# Patient Record
Sex: FEMALE | Race: WHITE | Employment: FULL TIME | URBAN - METROPOLITAN AREA
[De-identification: names, ages, dates, MRNs, and addresses within clinical notes are randomized per-mention and may not be internally consistent; named-entity substitution may affect disease eponyms.]

---

## 2017-05-30 ENCOUNTER — EMPLOYEE WELLNESS (OUTPATIENT)
Dept: OTHER | Age: 52
End: 2017-05-30

## 2017-05-30 LAB
CHOLESTEROL, TOTAL: 209 MG/DL (ref 0–199)
FASTING: YES
GLUCOSE BLD-MCNC: 109 MG/DL (ref 74–109)
HDLC SERPL-MCNC: 54 MG/DL (ref 40–90)
LDL CHOLESTEROL CALCULATED: 129 MG/DL
TRIGL SERPL-MCNC: 130 MG/DL (ref 0–199)

## 2017-07-07 ENCOUNTER — OFFICE VISIT (OUTPATIENT)
Dept: BARIATRICS/WEIGHT MGMT | Age: 52
End: 2017-07-07

## 2017-07-07 VITALS
BODY MASS INDEX: 50.02 KG/M2 | TEMPERATURE: 98.1 F | SYSTOLIC BLOOD PRESSURE: 122 MMHG | HEART RATE: 77 BPM | DIASTOLIC BLOOD PRESSURE: 76 MMHG | HEIGHT: 64 IN | WEIGHT: 293 LBS | RESPIRATION RATE: 18 BRPM

## 2017-07-07 DIAGNOSIS — N97.0 INFERTILITY ASSOCIATED WITH ANOVULATION: ICD-10-CM

## 2017-07-07 DIAGNOSIS — K21.9 GASTROESOPHAGEAL REFLUX DISEASE, ESOPHAGITIS PRESENCE NOT SPECIFIED: ICD-10-CM

## 2017-07-07 DIAGNOSIS — E66.01 MORBID OBESITY DUE TO EXCESS CALORIES (HCC): Primary | ICD-10-CM

## 2017-07-07 DIAGNOSIS — I10 ESSENTIAL HYPERTENSION: ICD-10-CM

## 2017-07-07 DIAGNOSIS — M54.50 CHRONIC BILATERAL LOW BACK PAIN WITHOUT SCIATICA: ICD-10-CM

## 2017-07-07 DIAGNOSIS — G89.29 CHRONIC BILATERAL LOW BACK PAIN WITHOUT SCIATICA: ICD-10-CM

## 2017-07-07 PROCEDURE — 99203 OFFICE O/P NEW LOW 30 MIN: CPT | Performed by: SURGERY

## 2017-07-07 RX ORDER — OMEPRAZOLE 20 MG/1
20 CAPSULE, DELAYED RELEASE ORAL DAILY
COMMUNITY

## 2017-07-07 ASSESSMENT — ENCOUNTER SYMPTOMS
GASTROINTESTINAL NEGATIVE: 1
SHORTNESS OF BREATH: 1
ALLERGIC/IMMUNOLOGIC NEGATIVE: 1
APNEA: 0
EYES NEGATIVE: 1
BACK PAIN: 1

## 2017-07-19 ENCOUNTER — TELEPHONE (OUTPATIENT)
Dept: BARIATRICS/WEIGHT MGMT | Age: 52
End: 2017-07-19

## 2017-07-21 ENCOUNTER — OFFICE VISIT (OUTPATIENT)
Dept: BARIATRICS/WEIGHT MGMT | Age: 52
End: 2017-07-21

## 2017-07-21 DIAGNOSIS — E66.01 MORBID OBESITY, UNSPECIFIED OBESITY TYPE (HCC): Primary | ICD-10-CM

## 2017-07-24 DIAGNOSIS — Z13.21 MALNUTRITION SCREEN: Primary | ICD-10-CM

## 2017-07-24 DIAGNOSIS — Z01.818 PRE-OP TESTING: ICD-10-CM

## 2017-07-24 DIAGNOSIS — Z13.820 SCREENING FOR OSTEOPOROSIS: ICD-10-CM

## 2017-07-24 DIAGNOSIS — E66.01 MORBID OBESITY WITH BODY MASS INDEX OF 60.0-69.9 IN ADULT (HCC): ICD-10-CM

## 2017-08-08 ENCOUNTER — HOSPITAL ENCOUNTER (OUTPATIENT)
Dept: WOMENS IMAGING | Age: 52
Discharge: HOME | End: 2017-08-08
Payer: COMMERCIAL

## 2017-08-08 DIAGNOSIS — Z01.818 PRE-OP TESTING: ICD-10-CM

## 2017-08-08 DIAGNOSIS — Z13.21 MALNUTRITION SCREEN: ICD-10-CM

## 2017-08-08 DIAGNOSIS — E66.01 MORBID OBESITY WITH BODY MASS INDEX OF 60.0-69.9 IN ADULT (HCC): ICD-10-CM

## 2017-08-08 DIAGNOSIS — Z13.820 SCREENING FOR OSTEOPOROSIS: ICD-10-CM

## 2017-08-08 PROCEDURE — 77080 DXA BONE DENSITY AXIAL: CPT

## 2017-08-22 ENCOUNTER — OFFICE VISIT (OUTPATIENT)
Dept: BARIATRICS/WEIGHT MGMT | Age: 52
End: 2017-08-22
Payer: COMMERCIAL

## 2017-08-22 ENCOUNTER — OFFICE VISIT (OUTPATIENT)
Dept: BARIATRICS/WEIGHT MGMT | Age: 52
End: 2017-08-22

## 2017-08-22 VITALS
HEART RATE: 80 BPM | TEMPERATURE: 97.7 F | WEIGHT: 293 LBS | SYSTOLIC BLOOD PRESSURE: 136 MMHG | RESPIRATION RATE: 18 BRPM | BODY MASS INDEX: 50.02 KG/M2 | DIASTOLIC BLOOD PRESSURE: 84 MMHG | HEIGHT: 64 IN

## 2017-08-22 DIAGNOSIS — K21.9 GASTROESOPHAGEAL REFLUX DISEASE, ESOPHAGITIS PRESENCE NOT SPECIFIED: ICD-10-CM

## 2017-08-22 DIAGNOSIS — E66.01 MORBID OBESITY WITH BMI OF 60.0-69.9, ADULT (HCC): Primary | ICD-10-CM

## 2017-08-22 DIAGNOSIS — F32.A DEPRESSION, UNSPECIFIED DEPRESSION TYPE: ICD-10-CM

## 2017-08-22 DIAGNOSIS — E66.01 MORBID OBESITY, UNSPECIFIED OBESITY TYPE (HCC): Primary | ICD-10-CM

## 2017-08-22 DIAGNOSIS — I10 ESSENTIAL HYPERTENSION: ICD-10-CM

## 2017-08-22 PROCEDURE — 99213 OFFICE O/P EST LOW 20 MIN: CPT | Performed by: PHYSICIAN ASSISTANT

## 2017-08-30 ENCOUNTER — HOSPITAL ENCOUNTER (OUTPATIENT)
Age: 52
Discharge: HOME | End: 2017-08-30
Payer: COMMERCIAL

## 2017-08-30 DIAGNOSIS — E66.01 MORBID OBESITY WITH BODY MASS INDEX OF 60.0-69.9 IN ADULT (HCC): ICD-10-CM

## 2017-08-30 DIAGNOSIS — Z13.21 MALNUTRITION SCREEN: ICD-10-CM

## 2017-08-30 DIAGNOSIS — Z01.818 PRE-OP TESTING: ICD-10-CM

## 2017-08-30 LAB
ALBUMIN SERPL-MCNC: 4.1 G/DL (ref 3.5–5.1)
ALP BLD-CCNC: 97 U/L (ref 38–126)
ALT SERPL-CCNC: 34 U/L (ref 11–66)
ANION GAP SERPL CALCULATED.3IONS-SCNC: 13 MEQ/L (ref 8–16)
AST SERPL-CCNC: 23 U/L (ref 5–40)
AVERAGE GLUCOSE: 105 MG/DL (ref 70–126)
BASOPHILS # BLD: 1 %
BASOPHILS ABSOLUTE: 0.1 THOU/MM3 (ref 0–0.1)
BILIRUB SERPL-MCNC: 0.5 MG/DL (ref 0.3–1.2)
BUN BLDV-MCNC: 21 MG/DL (ref 7–22)
CALCIUM SERPL-MCNC: 9.5 MG/DL (ref 8.5–10.5)
CHLORIDE BLD-SCNC: 99 MEQ/L (ref 98–111)
CO2: 27 MEQ/L (ref 23–33)
CREAT SERPL-MCNC: 0.7 MG/DL (ref 0.4–1.2)
EKG ATRIAL RATE: 70 BPM
EKG P AXIS: 54 DEGREES
EKG P-R INTERVAL: 192 MS
EKG Q-T INTERVAL: 412 MS
EKG QRS DURATION: 88 MS
EKG QTC CALCULATION (BAZETT): 444 MS
EKG R AXIS: 78 DEGREES
EKG T AXIS: 59 DEGREES
EKG VENTRICULAR RATE: 70 BPM
EOSINOPHIL # BLD: 3.4 %
EOSINOPHILS ABSOLUTE: 0.2 THOU/MM3 (ref 0–0.4)
FERRITIN: 108 NG/ML (ref 10–291)
FOLATE: > 20 NG/ML (ref 4.8–24.2)
GFR SERPL CREATININE-BSD FRML MDRD: 88 ML/MIN/1.73M2
GLUCOSE BLD-MCNC: 102 MG/DL (ref 70–108)
HBA1C MFR BLD: 5.5 % (ref 4.4–6.4)
HCT VFR BLD CALC: 41.6 % (ref 37–47)
HEMOGLOBIN: 14.2 GM/DL (ref 12–16)
INR BLD: 1.04 (ref 0.85–1.13)
IRON: 95 UG/DL (ref 50–170)
LYMPHOCYTES # BLD: 20.6 %
LYMPHOCYTES ABSOLUTE: 1.4 THOU/MM3 (ref 1–4.8)
MCH RBC QN AUTO: 28.6 PG (ref 27–31)
MCHC RBC AUTO-ENTMCNC: 34.1 GM/DL (ref 33–37)
MCV RBC AUTO: 83.9 FL (ref 81–99)
MONOCYTES # BLD: 6.9 %
MONOCYTES ABSOLUTE: 0.5 THOU/MM3 (ref 0.4–1.3)
NUCLEATED RED BLOOD CELLS: 0 /100 WBC
PDW BLD-RTO: 13.9 % (ref 11.5–14.5)
PLATELET # BLD: 267 THOU/MM3 (ref 130–400)
PMV BLD AUTO: 9.3 MCM (ref 7.4–10.4)
POTASSIUM SERPL-SCNC: 3.7 MEQ/L (ref 3.5–5.2)
PREALBUMIN: 25.7 MG/DL (ref 20–40)
PTH INTACT: 60 PG/ML (ref 15–65)
RBC # BLD: 4.96 MILL/MM3 (ref 4.2–5.4)
RBC # BLD: NORMAL 10*6/UL
SEG NEUTROPHILS: 68.1 %
SEGMENTED NEUTROPHILS ABSOLUTE COUNT: 4.6 THOU/MM3 (ref 1.8–7.7)
SODIUM BLD-SCNC: 139 MEQ/L (ref 135–145)
TOTAL IRON BINDING CAPACITY: 318 UG/DL (ref 171–450)
TOTAL PROTEIN: 7.3 G/DL (ref 6.1–8)
TSH SERPL DL<=0.05 MIU/L-ACNC: 1.04 UIU/ML (ref 0.4–4.2)
VITAMIN B-12: 325 PG/ML (ref 211–911)
VITAMIN D 25-HYDROXY: 33 NG/ML (ref 30–100)
WBC # BLD: 6.8 THOU/MM3 (ref 4.8–10.8)

## 2017-08-30 PROCEDURE — 80050 GENERAL HEALTH PANEL: CPT

## 2017-08-30 PROCEDURE — 84134 ASSAY OF PREALBUMIN: CPT

## 2017-08-30 PROCEDURE — 82607 VITAMIN B-12: CPT

## 2017-08-30 PROCEDURE — 83970 ASSAY OF PARATHORMONE: CPT

## 2017-08-30 PROCEDURE — 82746 ASSAY OF FOLIC ACID SERUM: CPT

## 2017-08-30 PROCEDURE — 83540 ASSAY OF IRON: CPT

## 2017-08-30 PROCEDURE — 82306 VITAMIN D 25 HYDROXY: CPT

## 2017-08-30 PROCEDURE — G0480 DRUG TEST DEF 1-7 CLASSES: HCPCS

## 2017-08-30 PROCEDURE — 84590 ASSAY OF VITAMIN A: CPT

## 2017-08-30 PROCEDURE — 80307 DRUG TEST PRSMV CHEM ANLYZR: CPT

## 2017-08-30 PROCEDURE — 84425 ASSAY OF VITAMIN B-1: CPT

## 2017-08-30 PROCEDURE — 36415 COLL VENOUS BLD VENIPUNCTURE: CPT

## 2017-08-30 PROCEDURE — 82728 ASSAY OF FERRITIN: CPT

## 2017-08-30 PROCEDURE — 85610 PROTHROMBIN TIME: CPT

## 2017-08-30 PROCEDURE — 83550 IRON BINDING TEST: CPT

## 2017-08-30 PROCEDURE — 93005 ELECTROCARDIOGRAM TRACING: CPT

## 2017-08-30 PROCEDURE — 83036 HEMOGLOBIN GLYCOSYLATED A1C: CPT

## 2017-09-01 LAB — RETINOL (VITAMIN A): NORMAL

## 2017-09-02 LAB — VITAMIN B1 WHOLE BLOOD: 122 NMOL/L (ref 70–180)

## 2017-09-03 LAB — NICOTINE AND METABOLITES: NORMAL

## 2017-09-04 LAB — URINE DRUG PROFILE: NORMAL

## 2017-09-11 ENCOUNTER — TELEPHONE (OUTPATIENT)
Dept: SURGERY | Age: 52
End: 2017-09-11

## 2017-09-12 DIAGNOSIS — E66.01 MORBID OBESITY WITH BMI OF 60.0-69.9, ADULT (HCC): ICD-10-CM

## 2017-09-12 DIAGNOSIS — Z12.11 SCREENING FOR COLON CANCER: Primary | ICD-10-CM

## 2017-09-13 ENCOUNTER — TELEPHONE (OUTPATIENT)
Dept: SURGERY | Age: 52
End: 2017-09-13

## 2017-09-20 ENCOUNTER — OFFICE VISIT (OUTPATIENT)
Dept: BARIATRICS/WEIGHT MGMT | Age: 52
End: 2017-09-20

## 2017-09-20 ENCOUNTER — OFFICE VISIT (OUTPATIENT)
Dept: BARIATRICS/WEIGHT MGMT | Age: 52
End: 2017-09-20
Payer: COMMERCIAL

## 2017-09-20 VITALS
SYSTOLIC BLOOD PRESSURE: 136 MMHG | TEMPERATURE: 98.2 F | BODY MASS INDEX: 50.02 KG/M2 | HEIGHT: 64 IN | HEART RATE: 76 BPM | DIASTOLIC BLOOD PRESSURE: 84 MMHG | WEIGHT: 293 LBS

## 2017-09-20 VITALS — WEIGHT: 293 LBS | BODY MASS INDEX: 50.02 KG/M2 | HEIGHT: 64 IN

## 2017-09-20 DIAGNOSIS — I10 ESSENTIAL HYPERTENSION: ICD-10-CM

## 2017-09-20 DIAGNOSIS — E66.01 MORBID OBESITY WITH BMI OF 60.0-69.9, ADULT (HCC): Primary | ICD-10-CM

## 2017-09-20 DIAGNOSIS — F32.A DEPRESSION, UNSPECIFIED DEPRESSION TYPE: ICD-10-CM

## 2017-09-20 DIAGNOSIS — K21.9 GASTROESOPHAGEAL REFLUX DISEASE, ESOPHAGITIS PRESENCE NOT SPECIFIED: ICD-10-CM

## 2017-09-20 DIAGNOSIS — E66.01 MORBID OBESITY, UNSPECIFIED OBESITY TYPE (HCC): Primary | ICD-10-CM

## 2017-09-20 PROCEDURE — 99213 OFFICE O/P EST LOW 20 MIN: CPT | Performed by: PHYSICIAN ASSISTANT

## 2017-10-20 ENCOUNTER — OFFICE VISIT (OUTPATIENT)
Dept: BARIATRICS/WEIGHT MGMT | Age: 52
End: 2017-10-20
Payer: COMMERCIAL

## 2017-10-20 ENCOUNTER — OFFICE VISIT (OUTPATIENT)
Dept: BARIATRICS/WEIGHT MGMT | Age: 52
End: 2017-10-20

## 2017-10-20 VITALS
DIASTOLIC BLOOD PRESSURE: 66 MMHG | BODY MASS INDEX: 50.02 KG/M2 | SYSTOLIC BLOOD PRESSURE: 114 MMHG | RESPIRATION RATE: 18 BRPM | TEMPERATURE: 98.1 F | HEIGHT: 64 IN | WEIGHT: 293 LBS | HEART RATE: 86 BPM

## 2017-10-20 VITALS — HEIGHT: 64 IN | WEIGHT: 293 LBS | BODY MASS INDEX: 50.02 KG/M2

## 2017-10-20 DIAGNOSIS — E66.01 MORBID OBESITY DUE TO EXCESS CALORIES (HCC): Primary | ICD-10-CM

## 2017-10-20 DIAGNOSIS — M54.50 CHRONIC BILATERAL LOW BACK PAIN WITHOUT SCIATICA: ICD-10-CM

## 2017-10-20 DIAGNOSIS — N97.0 INFERTILITY ASSOCIATED WITH ANOVULATION: ICD-10-CM

## 2017-10-20 DIAGNOSIS — I10 HYPERTENSION, UNSPECIFIED TYPE: ICD-10-CM

## 2017-10-20 DIAGNOSIS — G89.29 CHRONIC BILATERAL LOW BACK PAIN WITHOUT SCIATICA: ICD-10-CM

## 2017-10-20 DIAGNOSIS — K21.9 GASTROESOPHAGEAL REFLUX DISEASE, ESOPHAGITIS PRESENCE NOT SPECIFIED: ICD-10-CM

## 2017-10-20 DIAGNOSIS — F32.A DEPRESSION, UNSPECIFIED DEPRESSION TYPE: ICD-10-CM

## 2017-10-20 DIAGNOSIS — E66.01 MORBID OBESITY WITH BMI OF 60.0-69.9, ADULT (HCC): Primary | ICD-10-CM

## 2017-10-20 PROCEDURE — 99213 OFFICE O/P EST LOW 20 MIN: CPT | Performed by: SURGERY

## 2017-10-20 ASSESSMENT — ENCOUNTER SYMPTOMS
APNEA: 0
ALLERGIC/IMMUNOLOGIC NEGATIVE: 1
GASTROINTESTINAL NEGATIVE: 1
BACK PAIN: 0
EYES NEGATIVE: 1
SHORTNESS OF BREATH: 1

## 2017-10-20 NOTE — PROGRESS NOTES
Assessment: Patient is a 46 y.o. female seen for   month three  follow up MNT visit for  pre op bariatric surgery desires sleeve    Vitals from current and previous visits:  Vitals 10/20/2017 8/59/7348   SYSTOLIC  158   DIASTOLIC  84   Site  Right Arm   Position  Sitting   Cuff Size  Large Adult   Pulse  76   Temp  98.2   Resp     Weight 363 lb 12.8 oz 367 lb   Height 5' 4\" 5' 4\"   BMI (wt*703/ht~2) 62.44 kg/m2 62.99 kg/m2   Pain Level     Waist (Inches)         Initial weight at start of Weight Management Program was: 365 lbs     Javier Kumar lost 4 lbs over one month  -Weight goal: lose weight.     -Nutritionally relevant labs:   Lab Results   Component Value Date/Time    LABA1C 5.5 08/30/2017 10:33 AM    GLUCOSE 102 08/30/2017 10:33 AM    GLUCOSE 109 05/30/2017 09:28 AM    CHOL 209 (H) 05/30/2017 09:28 AM    HDL 54 05/30/2017 09:28 AM    LDLCALC 129 05/30/2017 09:28 AM    TRIG 130 05/30/2017 09:28 AM                  Lab Results   Component Value Date    VITD25 33 08/30/2017     - Is patient taking daily Multivitamin:  Pt is taking a daily MVI    -Food Recall:   Has been packing meals for work and now making greek yogurt with kashi, and fruit,  Has been limiting snacks, specifically in the evening. Overall improved food choices with less eating out. Main Beverages: reports drinking at least 64 oz of water daily, omitted alcohol  -Impression of Dietary Intake: on average, 3 meals per day. - Pt  is   avoiding high fat/high sugar foods. Pt  is   including protein at meals and snacks. Exercise:    -Physical Activity is:  Using Fit Bit and has goal set at 5,000 steps daily. States most of the time is meeting this goal.  Overall pt recognizes requires to continue to improve physical activity especially after surgery. Nutrition Diagnosis: Overweight/Obesity related to currently undergoing MNT as evidenced by  Body mass index is 62.45 kg/m². .    Intervention:   Healthy behaviors: consuming fruits and vegetables,

## 2017-10-20 NOTE — PROGRESS NOTES
Eyes: Conjunctivae and EOM are normal. Pupils are equal, round, and reactive to light. Right eye exhibits no discharge. Left eye exhibits no discharge. No scleral icterus. Neck: Trachea normal, normal range of motion, full passive range of motion without pain and phonation normal. Neck supple. No JVD present. Cardiovascular: Normal rate, regular rhythm, S1 normal, S2 normal and normal heart sounds. Exam reveals no gallop and no friction rub. No murmur heard. Pulmonary/Chest: Effort normal and breath sounds normal. No respiratory distress. She has no decreased breath sounds. She has no wheezes. She has no rhonchi. She has no rales. She exhibits no tenderness and no deformity. Abdominal: Soft. Bowel sounds are normal. She exhibits no distension, no abdominal bruit and no mass. There is no hepatosplenomegaly. There is no tenderness. There is no rigidity, no rebound and no guarding. No hernia. Hernia confirmed negative in the ventral area. Musculoskeletal: Normal range of motion. She exhibits no edema or tenderness. Neurological: She is alert and oriented to person, place, and time. She has normal strength. No cranial nerve deficit or sensory deficit. Skin: Skin is warm and dry. No rash noted. She is not diaphoretic. No erythema. No pallor. Psychiatric: She has a normal mood and affect. Her speech is normal and behavior is normal. Judgment and thought content normal. Cognition and memory are normal.   Vitals reviewed.     CBC   Lab Results   Component Value Date    WBC 6.8 08/30/2017    RBC 4.96 08/30/2017    RBC 4.82 11/17/2011    HGB 14.2 08/30/2017    HCT 41.6 08/30/2017    MCV 83.9 08/30/2017    MCH 28.6 08/30/2017    MCHC 34.1 08/30/2017    RDW 13.9 08/30/2017     08/30/2017    MPV 9.3 08/30/2017    RBCMORP NORMAL 08/30/2017    SEGSPCT 68.1 08/30/2017    LABLYMP 20.6 08/30/2017    LABLYMP 19.4 11/17/2011    MONOPCT 6.9 08/30/2017    LABEOS 3.4 08/30/2017    BASO 1.0 08/30/2017    NRBC 0 08/30/2017    NRBC 0 11/17/2011    SEGSABS 4.6 08/30/2017    LYMPHSABS 1.4 08/30/2017    MONOSABS 0.5 08/30/2017    EOSABS 0.2 08/30/2017    BASOSABS 0.1 08/30/2017        BMP/CMP   Lab Results   Component Value Date    GLUCOSE 102 08/30/2017    CREATININE 0.7 08/30/2017    BUN 21 08/30/2017     08/30/2017    K 3.7 08/30/2017    CL 99 08/30/2017    CO2 27 08/30/2017    CALCIUM 9.5 08/30/2017    AST 23 08/30/2017    ALKPHOS 97 08/30/2017    PROT 7.3 08/30/2017    LABALBU 4.1 08/30/2017    BILITOT 0.5 08/30/2017    ALT 34 08/30/2017        PREALBUMIN   Lab Results   Component Value Date    PREALBUMIN 25.7 08/30/2017        VITAMIN B12   Lab Results   Component Value Date    LYDIWMMQ95 325 08/30/2017        24 HOUR URINE CALCIUM   No results found for: [place] CALCIUMUR     VITAMIN D   Lab Results   Component Value Date    VITD25 33 08/30/2017        VITAMIN B1/ THIAMINE   Lab Results   Component Value Date    OPAL9IIKEWH 122 08/30/2017        RBC FOLATE   Lab Results   Component Value Date    FOLATE > 20.0 08/30/2017        LIPID SCREEN (FASTING)   Lab Results   Component Value Date    CHOL 209 05/30/2017    TRIG 130 05/30/2017    HDL 54 05/30/2017    LDLCALC 129 05/30/2017   ,     HGA1C (T2DM ONLY)   Lab Results   Component Value Date    LABA1C 5.5 08/30/2017    AVGG 105 08/30/2017        TSH   Lab Results   Component Value Date    TSH 1.040 08/30/2017        IRON   Lab Results   Component Value Date    IRON 95 08/30/2017        IONIZED CALCIUM   No results found for: Jordishar Ana     EGD: Pending    Patient Active Problem List   Diagnosis    DUB (dysfunctional uterine bleeding)    Hypertension     Assessment:   1. Morbid obesity (BMI 62)  2. GERD  3. Hypertension  4. Depression  5. Infertility  6. Chronic back pain    Plan:   1. Discussion again about the pros and cons of weight loss surgery.   The risks benefits and alternatives to laparoscopic adjustable band, gastric sleeve and gastric

## 2017-10-25 ENCOUNTER — ANESTHESIA EVENT (OUTPATIENT)
Dept: ENDOSCOPY | Age: 52
End: 2017-10-25
Payer: COMMERCIAL

## 2017-10-25 ENCOUNTER — HOSPITAL ENCOUNTER (OUTPATIENT)
Age: 52
Setting detail: OUTPATIENT SURGERY
Discharge: HOME | End: 2017-10-25
Attending: SURGERY | Admitting: SURGERY
Payer: COMMERCIAL

## 2017-10-25 ENCOUNTER — ANESTHESIA (OUTPATIENT)
Dept: ENDOSCOPY | Age: 52
End: 2017-10-25
Payer: COMMERCIAL

## 2017-10-25 VITALS
HEART RATE: 77 BPM | SYSTOLIC BLOOD PRESSURE: 109 MMHG | OXYGEN SATURATION: 97 % | DIASTOLIC BLOOD PRESSURE: 66 MMHG | BODY MASS INDEX: 48.82 KG/M2 | WEIGHT: 293 LBS | HEIGHT: 65 IN | TEMPERATURE: 97.7 F | RESPIRATION RATE: 18 BRPM

## 2017-10-25 VITALS
SYSTOLIC BLOOD PRESSURE: 112 MMHG | DIASTOLIC BLOOD PRESSURE: 63 MMHG | RESPIRATION RATE: 16 BRPM | OXYGEN SATURATION: 96 %

## 2017-10-25 PROCEDURE — 7100000000 HC PACU RECOVERY - FIRST 15 MIN: Performed by: SURGERY

## 2017-10-25 PROCEDURE — 43239 EGD BIOPSY SINGLE/MULTIPLE: CPT | Performed by: SURGERY

## 2017-10-25 PROCEDURE — 2580000003 HC RX 258

## 2017-10-25 PROCEDURE — 86677 HELICOBACTER PYLORI ANTIBODY: CPT

## 2017-10-25 PROCEDURE — 6360000002 HC RX W HCPCS: Performed by: SPECIALIST

## 2017-10-25 PROCEDURE — 3609027000 HC COLONOSCOPY: Performed by: SURGERY

## 2017-10-25 PROCEDURE — 6370000000 HC RX 637 (ALT 250 FOR IP)

## 2017-10-25 PROCEDURE — 3700000000 HC ANESTHESIA ATTENDED CARE: Performed by: SURGERY

## 2017-10-25 PROCEDURE — 7100000001 HC PACU RECOVERY - ADDTL 15 MIN: Performed by: SURGERY

## 2017-10-25 PROCEDURE — 3609012400 HC EGD TRANSORAL BIOPSY SINGLE/MULTIPLE: Performed by: SURGERY

## 2017-10-25 PROCEDURE — 6370000000 HC RX 637 (ALT 250 FOR IP): Performed by: SPECIALIST

## 2017-10-25 PROCEDURE — 3700000001 HC ADD 15 MINUTES (ANESTHESIA): Performed by: SURGERY

## 2017-10-25 PROCEDURE — 45378 DIAGNOSTIC COLONOSCOPY: CPT | Performed by: SURGERY

## 2017-10-25 PROCEDURE — 2500000003 HC RX 250 WO HCPCS: Performed by: SPECIALIST

## 2017-10-25 RX ORDER — LIDOCAINE HYDROCHLORIDE 20 MG/ML
INJECTION, SOLUTION EPIDURAL; INFILTRATION; INTRACAUDAL; PERINEURAL PRN
Status: DISCONTINUED | OUTPATIENT
Start: 2017-10-25 | End: 2017-10-25 | Stop reason: SDUPTHER

## 2017-10-25 RX ORDER — GLYCOPYRROLATE 0.2 MG/ML
INJECTION INTRAMUSCULAR; INTRAVENOUS PRN
Status: DISCONTINUED | OUTPATIENT
Start: 2017-10-25 | End: 2017-10-25 | Stop reason: SDUPTHER

## 2017-10-25 RX ORDER — SODIUM CHLORIDE 450 MG/100ML
INJECTION, SOLUTION INTRAVENOUS CONTINUOUS
Status: DISCONTINUED | OUTPATIENT
Start: 2017-10-25 | End: 2017-10-25 | Stop reason: HOSPADM

## 2017-10-25 RX ORDER — PROPOFOL 10 MG/ML
INJECTION, EMULSION INTRAVENOUS PRN
Status: DISCONTINUED | OUTPATIENT
Start: 2017-10-25 | End: 2017-10-25 | Stop reason: SDUPTHER

## 2017-10-25 RX ADMIN — SODIUM CHLORIDE: 4.5 INJECTION, SOLUTION INTRAVENOUS at 08:19

## 2017-10-25 RX ADMIN — PROPOFOL 130 MG: 10 INJECTION, EMULSION INTRAVENOUS at 09:25

## 2017-10-25 RX ADMIN — PROPOFOL 200 MG: 10 INJECTION, EMULSION INTRAVENOUS at 09:05

## 2017-10-25 RX ADMIN — GLYCOPYRROLATE 0.1 MG: 0.2 INJECTION, SOLUTION INTRAMUSCULAR; INTRAVENOUS at 08:50

## 2017-10-25 RX ADMIN — PROPOFOL 200 MG: 10 INJECTION, EMULSION INTRAVENOUS at 09:00

## 2017-10-25 RX ADMIN — PROPOFOL 200 MG: 10 INJECTION, EMULSION INTRAVENOUS at 09:15

## 2017-10-25 RX ADMIN — LIDOCAINE HYDROCHLORIDE 100 MG: 20 INJECTION, SOLUTION EPIDURAL; INFILTRATION; INTRACAUDAL; PERINEURAL at 08:54

## 2017-10-25 RX ADMIN — PROPOFOL 200 MG: 10 INJECTION, EMULSION INTRAVENOUS at 08:54

## 2017-10-25 RX ADMIN — LIDOCAINE HYDROCHLORIDE 35 ML: 20 SOLUTION ORAL; TOPICAL at 08:50

## 2017-10-25 ASSESSMENT — PAIN SCALES - GENERAL
PAINLEVEL_OUTOF10: 0
PAINLEVEL_OUTOF10: 0

## 2017-10-25 ASSESSMENT — PAIN - FUNCTIONAL ASSESSMENT: PAIN_FUNCTIONAL_ASSESSMENT: 0-10

## 2017-10-25 ASSESSMENT — ENCOUNTER SYMPTOMS: DYSPNEA ACTIVITY LEVEL: AFTER AMBULATING 1 FLIGHT OF STAIRS

## 2017-10-25 NOTE — ANESTHESIA PRE PROCEDURE
Department of Anesthesiology  Preprocedure Note       Name:  Kenneth Gamble   Age:  46 y.o.  :  1965                                          MRN:  [id number]         Date:  10/25/2017      Surgeon: Dorene Johnson):  Chance Clemens MD    Procedure: Procedure(s):  COLONOSCOPY  EGD ESOPHAGOGASTRODUODENOSCOPY    Medications prior to admission:   Prior to Admission medications    Medication Sig Start Date End Date Taking? Authorizing Provider   omeprazole (PRILOSEC) 20 MG delayed release capsule Take 20 mg by mouth daily   Yes Historical Provider, MD   citalopram (CELEXA) 20 MG tablet Take 20 mg by mouth daily. Yes Historical Provider, MD   hydrochlorothiazide (HYDRODIURIL) 25 MG tablet Take 25 mg by mouth daily. DO NOT TAKE AM OF SURGERY    Yes Historical Provider, MD   therapeutic multivitamin-minerals (THERAGRAN-M) tablet Take 1 tablet by mouth daily. STOP NOW FOR SURGERY    Yes Historical Provider, MD       Current medications:    Current Facility-Administered Medications   Medication Dose Route Frequency Provider Last Rate Last Dose    0.45 % sodium chloride infusion   Intravenous Continuous Shanta Foster  mL/hr at 58/64/59 0819         Allergies:     Allergies   Allergen Reactions    Percocet [Oxycodone-Acetaminophen] Rash       Problem List:    Patient Active Problem List   Diagnosis Code    DUB (dysfunctional uterine bleeding) N93.8    Hypertension I10       Past Medical History:        Diagnosis Date    GERD (gastroesophageal reflux disease)     Hypertension     Infertility, female     Nausea & vomiting        Past Surgical History:        Procedure Laterality Date    ABDOMEN SURGERY      RT OOPHERECTOMY    LEG DEBRIDEMENT      RT        Social History:    Social History   Substance Use Topics    Smoking status: Never Smoker    Smokeless tobacco: Never Used    Alcohol use Yes      Comment: SOCIALLY                                Counseling given: Not Answered      Vital Signs (Current):   Vitals:    10/25/17 0800   BP: (!) 144/72   Pulse: 81   Resp: 16   Temp: 36.7 °C (98.1 °F)   TempSrc: Temporal   SpO2: 96%   Weight: (!) 350 lb 12.8 oz (159.1 kg)   Height: 5' 5\" (1.651 m)                                              BP Readings from Last 3 Encounters:   10/25/17 (!) 144/72   10/20/17 114/66   09/20/17 136/84       NPO Status: Time of last liquid consumption: 2330                        Time of last solid consumption: 1900                        Date of last liquid consumption: 10/24/17                        Date of last solid food consumption: 10/23/17    BMI:   Wt Readings from Last 3 Encounters:   10/25/17 (!) 350 lb 12.8 oz (159.1 kg)   10/20/17 (!) 363 lb 12.8 oz (165 kg)   10/20/17 (!) 363 lb 12.8 oz (165 kg)     Body mass index is 58.38 kg/m². CBC:   Lab Results   Component Value Date    WBC 6.8 08/30/2017    RBC 4.96 08/30/2017    RBC 4.82 11/17/2011    HGB 14.2 08/30/2017    HCT 41.6 08/30/2017    MCV 83.9 08/30/2017    RDW 13.9 08/30/2017     08/30/2017       CMP:   Lab Results   Component Value Date     08/30/2017    K 3.7 08/30/2017    CL 99 08/30/2017    CO2 27 08/30/2017    BUN 21 08/30/2017    CREATININE 0.7 08/30/2017    LABGLOM 88 08/30/2017    GLUCOSE 102 08/30/2017    PROT 7.3 08/30/2017    CALCIUM 9.5 08/30/2017    BILITOT 0.5 08/30/2017    ALKPHOS 97 08/30/2017    AST 23 08/30/2017    ALT 34 08/30/2017       POC Tests: No results for input(s): POCGLU, POCNA, POCK, POCCL, POCBUN, POCHEMO, POCHCT in the last 72 hours.     Coags:   Lab Results   Component Value Date    INR 1.04 08/30/2017       HCG (If Applicable):   Lab Results   Component Value Date    PREGTESTUR NEGATIVE 08/02/2012        ABGs: No results found for: PHART, PO2ART, UMT2GGU, MEP9XUO, BEART, X6GIQSAM     Type & Screen (If Applicable):  No results found for: LABABO, [address]    Anesthesia Evaluation  Patient summary reviewed and Nursing notes reviewed   history of anesthetic complications: PONV. Airway: Mallampati: II  TM distance: >3 FB   Neck ROM: full  Mouth opening: > = 3 FB Dental: normal exam         Pulmonary:negative ROS and normal exam                               Cardiovascular:  Exercise tolerance: poor (<4 METS),   (+) hypertension: moderate, GAN: after ambulating 1 flight of stairs,         Rhythm: regular  Rate: normal           Beta Blocker:  Not on Beta Blocker         Neuro/Psych:   {neg ROS              GI/Hepatic/Renal:   (+) GERD:, bowel prep          Endo/Other: negative ROS                    Abdominal:   (+) obese,     Abdomen: soft. Vascular:                                      Anesthesia Plan      MAC     ASA 3       Induction: intravenous. Anesthetic plan and risks discussed with patient. Plan discussed with attending.                   Bairon Herbert CRNA   10/25/2017

## 2017-10-26 LAB — UREASE-CLO-C. PYLORI: NEGATIVE

## 2017-10-26 NOTE — OP NOTE
[id number] [id number]                               OPERATIVE REPORT    PATIENT NAME: Val Ortega              :             1965  MED REC NO:   [id number]                            ROOM:  ACCOUNT NO:   [de-identified]                            ADMISSION DATE:  10/25/2017  PROVIDER:     Sheila Noe MD    DATE OF PROCEDURE:  10/25/2017    PREOPERATIVE DIAGNOSES:  1. Need for screening colonoscopy. 2.  Morbid obesity. POSTOPERATIVE DIAGNOSES:  1.  Mild diverticulosis. 2.  Normal EGD. OPERATION:  1. Colonoscopy. 2.  EGD with biopsies for KAI. SURGEON:  Dr. Luther Montejo. ANESTHESIA:  Per nurse anesthesia. COMPLICATIONS:  None. INDICATIONS FOR PROCEDURE:  The patient is a 70-year-old white female  who is in the weight management program under the direction of Dr. Shahida Powell. She is being considered for a sleeve gastrectomy and is  here for pre-sleeve EGD. She also has never had a screening  colonoscopy and this is being done at the same time. FINDINGS ON COLONOSCOPY:  The patient did have some mild  diverticulosis, otherwise there was no polyps. No other mucosal  abnormalities noted. On upper scope EGD, the patient had EG junction  38 cm from the incisors. There is no esophagitis. No hiatal hernia. No gastric masses. Antrum was patent as was the pylorus and duodenum  normal.    PROCEDURE IN DETAIL:  The patient was brought into the endoscopy  suite, placed in the left lateral decubitus position. After adequate  nurse anesthesia was administered with Diprivan, the Olympus endoscope  was inserted into the anus and easily passed up through the rectum  through the sigmoid colon up through the descending colon across the  transverse colon and into the cecum. The picture of the cecum was  taken. Scope was then withdrawn.   There was a few diverticula in the  ascending colon, transverse colon was normal.  It should be noted as  the scope was withdrawn to be reinserted to view the preceding area. The scope was then withdrawn back through the sigmoid colon, back  through the rectum which again was normal without evidence of polyps,  although there was scattered diverticulosis. The scope was withdrawn  back to the rectum and out. The table was flipped around and the  Olympus endoscope was inserted into the back of the throat and easily  slid down through the esophagus down to the EG junction, it was  approximately 38 cm from the incisors. The body of the stomach  appeared normal as we gained entrance into the antrum. The pylorus  was widely patent. The scope was passed through the pylorus into the  duodenum which appeared normal.  Scope was withdrawn back into the  antrum where biopsies were taken for KAI. Retroflexed view of the EG  junction was then obtained showing no evidence of hiatal hernia. No  other upper epigastric masses or ulcerations. Scope was straightened,  withdrawn back to the esophagus which was normal.  The patient  tolerated the procedure well. [facility], MD    D: 10/26/2017 0:18:27       T: 10/26/2017 0:20:47     [email or web address]  Job#: [id number]     Doc#: [id number]

## 2017-10-30 NOTE — H&P
[address] [place] [address]                      PREOPERATIVE HISTORY AND PHYSICAL    PATIENT NAME: Jose E Leal              :             1965  MED REC NO:   [id number]                            ROOM:  ACCOUNT NO:   [de-identified]                            ADMISSION DATE:  10/25/2017  PROVIDER:     Tiny Montez. Elyssa Orozco MD    CHIEF COMPLAINT:  1.  Morbid obesity. 2.  Need for screening colonoscopy. HISTORY OF PRESENT ILLNESS:  The patient is a 49-year-old white female  in the weight management program under the direction of Dr. Kofi Hoang. She is going to be considered for a gastric sleeve but is here for  pre-gastric sleeve EGD. She also has never had a screening  colonoscopy and we felt it was good time to do both. She is being  admitted at this time for EGD prior to gastric sleeve and colonoscopy. PAST MEDICAL HISTORY:  Positive for infertility, hypertension, and  gastroesophageal reflux disease. SURGERIES: Include a right oophorectomy and she has had debridement of  her right leg. ALLERGIES:  PERCOCET. FAMILY HISTORY:  Positive for hypertension, arthritis, cancer,  obesity. SOCIAL HISTORY:  She is . She is a nonsmoker, is a social user  of alcohol. PHYSICAL EXAMINATION:  GENERAL:  The patient is a 49-year-old white female, obese, who  appears her age. HEAD, EARS, EYES, NOSE AND THROAT:  No scleral icterus. CARDIOVASCULAR:  S1, S2.  LUNGS:  Respirations are clear. ABDOMEN:  Soft. Bowel sounds are positive. EXTREMITIES:  Within normal limits. ASSESSMENT AND PLAN:  1. Morbid obesity. 2.  Need for screening colonoscopy. The patient is being admitted at  this time for EGD and colonoscopy. [facility], MD    D: 10/29/2017 22:25:22       T: 10/29/2017 22:27:00     _  Job#: [id number]     Doc#: [id number]

## 2017-11-06 ENCOUNTER — OFFICE VISIT (OUTPATIENT)
Dept: BARIATRICS/WEIGHT MGMT | Age: 52
End: 2017-11-06

## 2017-11-06 DIAGNOSIS — E66.01 MORBID OBESITY DUE TO EXCESS CALORIES (HCC): Primary | ICD-10-CM

## 2017-11-06 DIAGNOSIS — E66.01 MORBID OBESITY WITH BMI OF 60.0-69.9, ADULT (HCC): ICD-10-CM

## 2017-11-06 DIAGNOSIS — Z12.11 SCREENING FOR COLON CANCER: ICD-10-CM

## 2017-11-07 NOTE — PROGRESS NOTES
Carrol Severino gained 1 lb  since joining Weight Management Program July 2017. After nutrition evaluation and education, patient is considered to be a good surgical candidate from a MNT perspective. Patient's body composition for pre-op teaching class  was measured using the [facility] Scale. Results were saved and reviewed with the patient. Patient was educated on 2- week pre-operative nutrition protocol and post test completed. Pre-operative and post-operative diet guidelines were discussed and written information was provided. Nectar protein supplements were purchased. Vitamin regimen with Bariatric Advantage and/or Celebrate vitamins was explained, and patient purchased a 90 day supply at this visit. Importance of vitamin compliance was discussed and potential of vitamin deficiencies was reviewed. Encouraged continued physical activity. Patient signed agreement stating they are committed to lifelong follow up, smoking and alcohol cessation, vitamin compliance, and 2 week pre-operative dietary protocol.

## 2017-11-08 ENCOUNTER — HOSPITAL ENCOUNTER (OUTPATIENT)
Age: 52
Discharge: HOME | End: 2017-11-08
Payer: COMMERCIAL

## 2017-11-08 ENCOUNTER — HOSPITAL ENCOUNTER (OUTPATIENT)
Dept: GENERAL RADIOLOGY | Age: 52
Discharge: HOME | End: 2017-11-08
Payer: COMMERCIAL

## 2017-11-08 DIAGNOSIS — E66.01 MORBID OBESITY WITH BMI OF 60.0-69.9, ADULT (HCC): ICD-10-CM

## 2017-11-08 DIAGNOSIS — I10 HYPERTENSION, UNSPECIFIED TYPE: ICD-10-CM

## 2017-11-08 LAB
ANION GAP SERPL CALCULATED.3IONS-SCNC: 14 MEQ/L (ref 8–16)
BASOPHILS # BLD: 0.7 %
BASOPHILS ABSOLUTE: 0 THOU/MM3 (ref 0–0.1)
BUN BLDV-MCNC: 20 MG/DL (ref 7–22)
CALCIUM SERPL-MCNC: 9.4 MG/DL (ref 8.5–10.5)
CHLORIDE BLD-SCNC: 97 MEQ/L (ref 98–111)
CO2: 27 MEQ/L (ref 23–33)
CREAT SERPL-MCNC: 0.7 MG/DL (ref 0.4–1.2)
EOSINOPHIL # BLD: 2.6 %
EOSINOPHILS ABSOLUTE: 0.2 THOU/MM3 (ref 0–0.4)
GFR SERPL CREATININE-BSD FRML MDRD: 88 ML/MIN/1.73M2
GLUCOSE BLD-MCNC: 103 MG/DL (ref 70–108)
HCT VFR BLD CALC: 41.9 % (ref 37–47)
HEMOGLOBIN: 14.3 GM/DL (ref 12–16)
LYMPHOCYTES # BLD: 20.4 %
LYMPHOCYTES ABSOLUTE: 1.2 THOU/MM3 (ref 1–4.8)
MCH RBC QN AUTO: 28.9 PG (ref 27–31)
MCHC RBC AUTO-ENTMCNC: 34.2 GM/DL (ref 33–37)
MCV RBC AUTO: 84.7 FL (ref 81–99)
MONOCYTES # BLD: 7.4 %
MONOCYTES ABSOLUTE: 0.4 THOU/MM3 (ref 0.4–1.3)
NUCLEATED RED BLOOD CELLS: 0 /100 WBC
PDW BLD-RTO: 14.1 % (ref 11.5–14.5)
PLATELET # BLD: 255 THOU/MM3 (ref 130–400)
PMV BLD AUTO: 9.4 MCM (ref 7.4–10.4)
POTASSIUM SERPL-SCNC: 3.8 MEQ/L (ref 3.5–5.2)
RBC # BLD: 4.95 MILL/MM3 (ref 4.2–5.4)
SEG NEUTROPHILS: 68.9 %
SEGMENTED NEUTROPHILS ABSOLUTE COUNT: 4.1 THOU/MM3 (ref 1.8–7.7)
SODIUM BLD-SCNC: 138 MEQ/L (ref 135–145)
WBC # BLD: 5.9 THOU/MM3 (ref 4.8–10.8)

## 2017-11-08 PROCEDURE — 85025 COMPLETE CBC W/AUTO DIFF WBC: CPT

## 2017-11-08 PROCEDURE — 36415 COLL VENOUS BLD VENIPUNCTURE: CPT

## 2017-11-08 PROCEDURE — 80048 BASIC METABOLIC PNL TOTAL CA: CPT

## 2017-11-08 PROCEDURE — 71020 XR CHEST STANDARD TWO VW: CPT

## 2017-11-17 ENCOUNTER — OFFICE VISIT (OUTPATIENT)
Dept: BARIATRICS/WEIGHT MGMT | Age: 52
End: 2017-11-17
Payer: COMMERCIAL

## 2017-11-17 VITALS
WEIGHT: 293 LBS | RESPIRATION RATE: 18 BRPM | BODY MASS INDEX: 50.02 KG/M2 | DIASTOLIC BLOOD PRESSURE: 78 MMHG | SYSTOLIC BLOOD PRESSURE: 112 MMHG | HEIGHT: 64 IN | HEART RATE: 88 BPM | TEMPERATURE: 97.6 F

## 2017-11-17 DIAGNOSIS — M54.50 CHRONIC BILATERAL LOW BACK PAIN WITHOUT SCIATICA: ICD-10-CM

## 2017-11-17 DIAGNOSIS — N97.0 INFERTILITY ASSOCIATED WITH ANOVULATION: ICD-10-CM

## 2017-11-17 DIAGNOSIS — E66.01 MORBID OBESITY DUE TO EXCESS CALORIES (HCC): Primary | ICD-10-CM

## 2017-11-17 DIAGNOSIS — K21.9 GASTROESOPHAGEAL REFLUX DISEASE, ESOPHAGITIS PRESENCE NOT SPECIFIED: ICD-10-CM

## 2017-11-17 DIAGNOSIS — I10 HYPERTENSION, UNSPECIFIED TYPE: ICD-10-CM

## 2017-11-17 DIAGNOSIS — G89.29 CHRONIC BILATERAL LOW BACK PAIN WITHOUT SCIATICA: ICD-10-CM

## 2017-11-17 DIAGNOSIS — F32.A DEPRESSION, UNSPECIFIED DEPRESSION TYPE: ICD-10-CM

## 2017-11-17 PROCEDURE — 99213 OFFICE O/P EST LOW 20 MIN: CPT | Performed by: SURGERY

## 2017-11-17 ASSESSMENT — ENCOUNTER SYMPTOMS
BACK PAIN: 1
EYES NEGATIVE: 1
GASTROINTESTINAL NEGATIVE: 1
RESPIRATORY NEGATIVE: 1
ALLERGIC/IMMUNOLOGIC NEGATIVE: 1

## 2017-11-17 NOTE — PROGRESS NOTES
Subjective:     Patient ID: Nicole Marques is a 46 y.o. female    Chief Complaint   Patient presents with    Follow-up     h&p visit; initial 365lb 12.8oz, last 363lb 12.8oz, current 346.4lb     HPI  Nydia Campbell is a 55-year-old female who presents for follow-up at the weight management program secondary to her morbid obesity. BMI 59. She has not been successful with medical management for excess body weight loss. She wishes to proceed with robotic sleeve gastrectomy. She has completed upper endoscopy. Complete colonoscopy. Attended support groups. Completed psychology evaluation. Following recommendations given by the dietitians. Continuing to work on nutrition. Trying to make better food selections and decreasing portion size. Increasing walking but otherwise not much more with exercise/fitness. Attended fitness orientation. Denies any acute chest pain or shortness of breath. No abdominal pain. No new urinary complaints. No hematochezia or melena. Patient states she is ready to proceed with surgical intervention in hopes to lose increase excess body weight long-term. Review of Systems   Constitutional: Negative. HENT: Negative. Eyes: Negative. Respiratory: Negative. Cardiovascular: Negative. Gastrointestinal: Negative. Endocrine: Negative. Genitourinary: Negative. Musculoskeletal: Positive for arthralgias and back pain. Negative for myalgias and neck pain. Skin: Negative. Allergic/Immunologic: Negative. Neurological: Negative. Hematological: Negative. Psychiatric/Behavioral: Negative.       Past Medical History:   Diagnosis Date    GERD (gastroesophageal reflux disease)     Hypertension     Infertility, female     Nausea & vomiting     PONV (postoperative nausea and vomiting)        Past Surgical History:   Procedure Laterality Date    ABDOMEN SURGERY  2003    RT OOPHERECTOMY    COLONOSCOPY      DILATATION, ESOPHAGUS      LEG DEBRIDEMENT  2011 narrative on file     /78 (Site: Right Arm, Position: Sitting, Cuff Size: Large Adult)   Pulse 88   Temp 97.6 °F (36.4 °C) (Oral)   Resp 18   Ht 5' 4\" (1.626 m)   Wt (!) 346 lb 6.4 oz (157.1 kg)   BMI 59.46 kg/m²     Body mass index is 59.46 kg/m². Objective:   Physical Exam   Constitutional: She is oriented to person, place, and time. She appears well-developed and well-nourished. No distress. HENT:   Head: Normocephalic and atraumatic. Right Ear: External ear normal.   Left Ear: External ear normal.   Nose: Nose normal.   Mouth/Throat: Oropharynx is clear and moist and mucous membranes are normal. No oropharyngeal exudate. Eyes: Conjunctivae and EOM are normal. Pupils are equal, round, and reactive to light. Right eye exhibits no discharge. Left eye exhibits no discharge. No scleral icterus. Neck: Trachea normal, normal range of motion, full passive range of motion without pain and phonation normal. Neck supple. No JVD present. Cardiovascular: Normal rate, regular rhythm, S1 normal, S2 normal and normal heart sounds. Exam reveals no gallop and no friction rub. No murmur heard. Pulmonary/Chest: Effort normal and breath sounds normal. No respiratory distress. She has no decreased breath sounds. She has no wheezes. She has no rhonchi. She has no rales. She exhibits no tenderness and no deformity. Abdominal: Soft. Bowel sounds are normal. She exhibits no distension, no abdominal bruit and no mass. There is no hepatosplenomegaly. There is no tenderness. There is no rigidity, no rebound and no guarding. No hernia. Hernia confirmed negative in the ventral area. Musculoskeletal: Normal range of motion. She exhibits no edema or tenderness. Neurological: She is alert and oriented to person, place, and time. She has normal strength. No cranial nerve deficit or sensory deficit. Skin: Skin is warm and dry. No rash noted. She is not diaphoretic. No erythema. No pallor.    Psychiatric: She AVGG 105 08/30/2017        TSH   Lab Results   Component Value Date    TSH 1.040 08/30/2017        IRON   Lab Results   Component Value Date    IRON 95 08/30/2017        IONIZED CALCIUM   No results found for: IONCA CAION     Imaging -   Narrative   PROCEDURE: XR CHEST STANDARD TWO VW       CLINICAL INFORMATION: Morbid obesity with BMI of 60.0-69.9, adult ([facility] [address] Morbid obesity with BMI of 60.0-69.9, adult ([facility] [address] Hypertension, unspecified type       COMPARISON: No prior study.       TECHNIQUE: PA and lateral views of the chest were obtained.               Impression   1. Normal heart size. Tiny fibrotic or atelectatic plaque in the lingula. 2. Otherwise normal chest. No acute infiltrates or effusions are seen.         EGD and Cscope:  DATE OF PROCEDURE:  10/25/2017     PREOPERATIVE DIAGNOSES:  1. Need for screening colonoscopy. 2.  Morbid obesity.     POSTOPERATIVE DIAGNOSES:  1.  Mild diverticulosis. 2.  Normal EGD.     OPERATION:  1. Colonoscopy. 2.  EGD with biopsies for KAI.     SURGEON:  Dr. Paola Hernandez.     ANESTHESIA:  Per nurse anesthesia.     COMPLICATIONS:  None.     INDICATIONS FOR PROCEDURE:  The patient is a 72-year-old white female  who is in the weight management program under the direction of Dr. [facility]. She is being considered for a sleeve gastrectomy and is  here for pre-sleeve EGD. She also has never had a screening  colonoscopy and this is being done at the same time.     FINDINGS ON COLONOSCOPY:  The patient did have some mild  diverticulosis, otherwise there was no polyps. No other mucosal  abnormalities noted. On upper scope EGD, the patient had EG junction  38 cm from the incisors. There is no esophagitis. No hiatal hernia. No gastric masses. Antrum was patent as was the pylorus and duodenum  normal.     PROCEDURE IN DETAIL:  The patient was brought into the endoscopy  suite, placed in the left lateral decubitus position.   After adequate  nurse anesthesia was administered with Diprivan, the Olympus endoscope  was inserted into the anus and easily passed up through the rectum  through the sigmoid colon up through the descending colon across the  transverse colon and into the cecum. The picture of the cecum was  taken. Scope was then withdrawn. There was a few diverticula in the  ascending colon, transverse colon was normal.  It should be noted as  the scope was withdrawn to be reinserted to view the preceding area. The scope was then withdrawn back through the sigmoid colon, back  through the rectum which again was normal without evidence of polyps,  although there was scattered diverticulosis. The scope was withdrawn  back to the rectum and out. The table was flipped around and the  Olympus endoscope was inserted into the back of the throat and easily  slid down through the esophagus down to the EG junction, it was  approximately 38 cm from the incisors. The body of the stomach  appeared normal as we gained entrance into the antrum. The pylorus  was widely patent. The scope was passed through the pylorus into the  duodenum which appeared normal.  Scope was withdrawn back into the  antrum where biopsies were taken for KAI. Retroflexed view of the EG  junction was then obtained showing no evidence of hiatal hernia. No  other upper epigastric masses or ulcerations. Scope was straightened,  withdrawn back to the esophagus which was normal.  The patient  tolerated the procedure well.           TAPAN XIE [facility], MD    Patient Active Problem List   Diagnosis    DUB (dysfunctional uterine bleeding)    Hypertension    Morbid obesity ([address]     Assessment:   1. Morbid obesity (BMI 62)  2. GERD  3. Hypertension  4. Depression  5. Infertility  6. Chronic back pain    Plan:   1.  Discussion again about the pros and cons of weight loss surgery.  The risks benefits and alternatives to laparoscopic adjustable band, gastric sleeve and gastric Butch-en-Y bypass were sleeve gastrectomy.     --More than 15 minutes spent with patient today. Senora Mariluz than 50% of the time was [place] with counseling, education and coordinating care.

## 2017-11-18 NOTE — H&P
Subjective:      Patient ID: Lauren Garcia is a 46 y.o. female          Chief Complaint   Patient presents with    Follow-up       h&p visit; initial 365lb 12.8oz, last 363lb 12.8oz, current 346.4lb      HPI  Yadira Kirkland is a 20-year-old female who presents for follow-up at the weight management program secondary to her morbid obesity. BMI 59. She has not been successful with medical management for excess body weight loss. She wishes to proceed with robotic sleeve gastrectomy. She has completed upper endoscopy. Complete colonoscopy. Attended support groups. Completed psychology evaluation. Following recommendations given by the dietitians. Continuing to work on nutrition. Trying to make better food selections and decreasing portion size. Increasing walking but otherwise not much more with exercise/fitness. Attended fitness orientation. Denies any acute chest pain or shortness of breath. No abdominal pain. No new urinary complaints. No hematochezia or melena. Patient states she is ready to proceed with surgical intervention in hopes to lose increase excess body weight long-term.     Review of Systems   Constitutional: Negative. HENT: Negative. Eyes: Negative. Respiratory: Negative. Cardiovascular: Negative. Gastrointestinal: Negative. Endocrine: Negative. Genitourinary: Negative. Musculoskeletal: Positive for arthralgias and back pain. Negative for myalgias and neck pain. Skin: Negative. Allergic/Immunologic: Negative. Neurological: Negative. Hematological: Negative.     Psychiatric/Behavioral: Negative.       Past Medical History        Past Medical History:   Diagnosis Date    GERD (gastroesophageal reflux disease)      Hypertension      Infertility, female      Nausea & vomiting      PONV (postoperative nausea and vomiting)              Past Surgical History         Past Surgical History:   Procedure Laterality Date    ABDOMEN SURGERY   2003     RT file.           Social History Main Topics    Smoking status: Never Smoker    Smokeless tobacco: Never Used    Alcohol use No    Drug use: No    Sexual activity: Not on file           Other Topics Concern    Not on file          Social History Narrative    No narrative on file         /78 (Site: Right Arm, Position: Sitting, Cuff Size: Large Adult)   Pulse 88   Temp 97.6 °F (36.4 °C) (Oral)   Resp 18   Ht 5' 4\" (1.626 m)   Wt (!) 346 lb 6.4 oz (157.1 kg)   BMI 59.46 kg/m²      Body mass index is 59.46 kg/m².     Objective:   Physical Exam   Constitutional: She is oriented to person, place, and time. She appears well-developed and well-nourished. No distress. HENT:   Head: Normocephalic and atraumatic. Right Ear: External ear normal.   Left Ear: External ear normal.   Nose: Nose normal.   Mouth/Throat: Oropharynx is clear and moist and mucous membranes are normal. No oropharyngeal exudate. Eyes: Conjunctivae and EOM are normal. Pupils are equal, round, and reactive to light. Right eye exhibits no discharge. Left eye exhibits no discharge. No scleral icterus. Neck: Trachea normal, normal range of motion, full passive range of motion without pain and phonation normal. Neck supple. No JVD present. Cardiovascular: Normal rate, regular rhythm, S1 normal, S2 normal and normal heart sounds. Exam reveals no gallop and no friction rub. No murmur heard. Pulmonary/Chest: Effort normal and breath sounds normal. No respiratory distress. She has no decreased breath sounds. She has no wheezes. She has no rhonchi. She has no rales. She exhibits no tenderness and no deformity. Abdominal: Soft. Bowel sounds are normal. She exhibits no distension, no abdominal bruit and no mass. There is no hepatosplenomegaly. There is no tenderness. There is no rigidity, no rebound and no guarding. No hernia. Hernia confirmed negative in the ventral area. Musculoskeletal: Normal range of motion.  She exhibits no [id number] 122 08/30/2017         RBC FOLATE         Lab Results   Component Value Date     FOLATE > 20.0 08/30/2017         LIPID SCREEN (FASTING)         Lab Results   Component Value Date     CHOL 209 05/30/2017     TRIG 130 05/30/2017     HDL 54 05/30/2017     LDLCALC 129 05/30/2017   ,      HGA1C (T2DM ONLY)         Lab Results   Component Value Date     LABA1C 5.5 08/30/2017     AVGG 105 08/30/2017         TSH         Lab Results   Component Value Date     TSH 1.040 08/30/2017         IRON         Lab Results   Component Value Date     IRON 95 08/30/2017         IONIZED CALCIUM   No results found for: Girish Pool      Imaging -   Narrative   PROCEDURE: XR CHEST STANDARD TWO VW       CLINICAL INFORMATION: Morbid obesity with BMI of 60.0-69.9, adult ([address] Morbid obesity with BMI of 60.0-69.9, adult ([facility] [address] Hypertension, unspecified type       COMPARISON: No prior study.       TECHNIQUE: PA and lateral views of the chest were obtained.               Impression   1. Normal heart size. Tiny fibrotic or atelectatic plaque in the lingula. 2. Otherwise normal chest. No acute infiltrates or effusions are seen.          EGD and Cscope:  DATE OF PROCEDURE:  10/25/2017     PREOPERATIVE DIAGNOSES:  1.  Need for screening colonoscopy. 2.  Morbid obesity.     POSTOPERATIVE DIAGNOSES:  1.  Mild diverticulosis. 2.  Normal EGD.     OPERATION:  1.  Colonoscopy.   2.  EGD with biopsies for KAI.     SURGEON:  Dr. Alcon Spain.     ANESTHESIA:  Per nurse anesthesia.     COMPLICATIONS:  None.     INDICATIONS FOR PROCEDURE:  The patient is a 80-year-old white female  who is in the weight management program under the direction of Dr. Ron Goldsmith is being considered for a sleeve gastrectomy and is  here for pre-sleeve EGD.  She also has never had a screening  colonoscopy and this is being done at the same time.     FINDINGS ON COLONOSCOPY:  The patient did have some mild  diverticulosis, otherwise there was no polyps.  No other mucosal  abnormalities noted.  On upper scope EGD, the patient had EG junction  38 cm from the incisors.  There is no esophagitis.  No hiatal hernia. No gastric masses.  Antrum was patent as was the pylorus and duodenum  normal.     PROCEDURE IN DETAIL:  The patient was brought into the endoscopy  suite, placed in the left lateral decubitus position.  After adequate  nurse anesthesia was administered with Diprivan, the Olympus endoscope  was inserted into the anus and easily passed up through the rectum  through the sigmoid colon up through the descending colon across the  transverse colon and into the cecum.  The picture of the cecum was  taken.  Scope was then withdrawn.  There was a few diverticula in the  ascending colon, transverse colon was normal.  It should be noted as  the scope was withdrawn to be reinserted to view the preceding area. The scope was then withdrawn back through the sigmoid colon, back  through the rectum which again was normal without evidence of polyps,  although there was scattered diverticulosis.  The scope was withdrawn  back to the rectum and out.  The table was flipped around and the  Olympus endoscope was inserted into the back of the throat and easily  slid down through the esophagus down to the EG junction, it was  approximately 38 cm from the incisors.  The body of the stomach  appeared normal as we gained entrance into the antrum.  The pylorus  was widely patent.  The scope was passed through the pylorus into the  duodenum which appeared normal.  Scope was withdrawn back into the  antrum where biopsies were taken for KAI.  Retroflexed view of the EG  junction was then obtained showing no evidence of hiatal hernia.  No  other upper epigastric masses or ulcerations.  Scope was straightened,  withdrawn back to the esophagus which was normal.  The patient  tolerated the procedure well.           TAPAN XIE [facility], MD         Patient Active Problem List   Diagnosis    DUB (dysfunctional uterine bleeding)    Hypertension    Morbid obesity (HCC)      Assessment:   1.  Morbid obesity (BMI 62)  2.  GERD  3.  Hypertension  4.  Depression  5.  Infertility  6.  Chronic back pain     Plan:   1. Discussion again about the pros and cons of weight loss surgery.  The risks benefits and alternatives to laparoscopic adjustable band, gastric sleeve and gastric Butch-en-Y bypass were discussed in detail.  The pros and cons of robotic assisted, laparoscopic and open techniques were discussed. 2.  Behavior modification discussed in detail in regards to dietary habits. 3.  Nutritional education occurred during visit.  Follow-up with dietitian for further evaluation. 4.  Options for medical management of morbid obesity discussed. 5.  Improvement in fitness/exercise discussed with patient and the need for this with/without surgery. 6.  Medical necessity letter from PCP. 7.  Follow-up in one week after surgery in the weight management program at [facility]. 8.  Signs and symptoms reviewed with patient that would be concerning and need her to return to office for re-evaluation. Patient states she will call if she has questions or concerns.   9. Multivitamin  10. Psychology evaluation completed - follow-up as needed   11. EGD and Cscope completed. No concerning findings. Results discussed with patient. All questions answered. 12.  Encouraged support groups  13.  Discussed the pros and cons of weight loss medications and their possible side effects. 14.  Scheduled Josephine Hunt for robotic sleeve gastrectomy. 15. She has undergone pre-operative clearance per anesthesia guidelines with risk factors listed under the past medical history diagnosis & problem list.  16. The risks, benefits and alternatives were discussed with Josephine Hunt including non-operative management. The pros and cons of robotic, laparoscopic and open techniques were discussed. All questions answered.  She understands

## 2017-11-20 ENCOUNTER — HOSPITAL ENCOUNTER (INPATIENT)
Age: 52
LOS: 1 days | Discharge: HOME HEALTH | DRG: 621 | End: 2017-11-21
Attending: SURGERY | Admitting: SURGERY
Payer: COMMERCIAL

## 2017-11-20 ENCOUNTER — ANESTHESIA (OUTPATIENT)
Dept: OPERATING ROOM | Age: 52
DRG: 621 | End: 2017-11-20
Payer: COMMERCIAL

## 2017-11-20 ENCOUNTER — ANESTHESIA EVENT (OUTPATIENT)
Dept: OPERATING ROOM | Age: 52
DRG: 621 | End: 2017-11-20
Payer: COMMERCIAL

## 2017-11-20 VITALS — SYSTOLIC BLOOD PRESSURE: 157 MMHG | TEMPERATURE: 97.7 F | OXYGEN SATURATION: 94 % | DIASTOLIC BLOOD PRESSURE: 125 MMHG

## 2017-11-20 DIAGNOSIS — Z98.84 S/P LAPAROSCOPIC SLEEVE GASTRECTOMY: ICD-10-CM

## 2017-11-20 DIAGNOSIS — E66.01 MORBID OBESITY WITH BMI OF 50.0-59.9, ADULT (HCC): Primary | ICD-10-CM

## 2017-11-20 DIAGNOSIS — E87.6 HYPOKALEMIA: Primary | ICD-10-CM

## 2017-11-20 PROBLEM — E66.9 OBESITY: Status: ACTIVE | Noted: 2017-11-20

## 2017-11-20 LAB
POTASSIUM SERPL-SCNC: 3.4 MEQ/L (ref 3.5–5.2)
PREGNANCY, URINE: NEGATIVE

## 2017-11-20 PROCEDURE — S0028 INJECTION, FAMOTIDINE, 20 MG: HCPCS | Performed by: SURGERY

## 2017-11-20 PROCEDURE — 3600000019 HC SURGERY ROBOT ADDTL 15MIN: Performed by: SURGERY

## 2017-11-20 PROCEDURE — 6360000002 HC RX W HCPCS: Performed by: SURGERY

## 2017-11-20 PROCEDURE — 2580000003 HC RX 258: Performed by: SURGERY

## 2017-11-20 PROCEDURE — 6370000000 HC RX 637 (ALT 250 FOR IP): Performed by: SURGERY

## 2017-11-20 PROCEDURE — 7100000001 HC PACU RECOVERY - ADDTL 15 MIN: Performed by: SURGERY

## 2017-11-20 PROCEDURE — 8E0W4CZ ROBOTIC ASSISTED PROCEDURE OF TRUNK REGION, PERCUTANEOUS ENDOSCOPIC APPROACH: ICD-10-PCS | Performed by: SURGERY

## 2017-11-20 PROCEDURE — 3700000000 HC ANESTHESIA ATTENDED CARE: Performed by: SURGERY

## 2017-11-20 PROCEDURE — 84132 ASSAY OF SERUM POTASSIUM: CPT

## 2017-11-20 PROCEDURE — 88300 SURGICAL PATH GROSS: CPT

## 2017-11-20 PROCEDURE — 3600000009 HC SURGERY ROBOT BASE: Performed by: SURGERY

## 2017-11-20 PROCEDURE — 2500000003 HC RX 250 WO HCPCS: Performed by: SURGERY

## 2017-11-20 PROCEDURE — A4450 NON-WATERPROOF TAPE: HCPCS | Performed by: SURGERY

## 2017-11-20 PROCEDURE — 2780000010 HC IMPLANT OTHER: Performed by: SURGERY

## 2017-11-20 PROCEDURE — 2500000003 HC RX 250 WO HCPCS: Performed by: SPECIALIST

## 2017-11-20 PROCEDURE — 3700000001 HC ADD 15 MINUTES (ANESTHESIA): Performed by: SURGERY

## 2017-11-20 PROCEDURE — C9250 ARTISS FIBRIN SEALANT: HCPCS | Performed by: SURGERY

## 2017-11-20 PROCEDURE — 36415 COLL VENOUS BLD VENIPUNCTURE: CPT

## 2017-11-20 PROCEDURE — 6360000002 HC RX W HCPCS: Performed by: SPECIALIST

## 2017-11-20 PROCEDURE — A6413 ADHESIVE BANDAGE, FIRST-AID: HCPCS | Performed by: SURGERY

## 2017-11-20 PROCEDURE — 81025 URINE PREGNANCY TEST: CPT

## 2017-11-20 PROCEDURE — 0DB60Z3 EXCISION OF STOMACH, OPEN APPROACH, VERTICAL: ICD-10-PCS | Performed by: SURGERY

## 2017-11-20 PROCEDURE — 6360000002 HC RX W HCPCS: Performed by: ANESTHESIOLOGY

## 2017-11-20 PROCEDURE — 7100000000 HC PACU RECOVERY - FIRST 15 MIN: Performed by: SURGERY

## 2017-11-20 PROCEDURE — 1200000000 HC SEMI PRIVATE

## 2017-11-20 PROCEDURE — 94762 N-INVAS EAR/PLS OXIMTRY CONT: CPT

## 2017-11-20 PROCEDURE — 43775 LAP SLEEVE GASTRECTOMY: CPT | Performed by: SURGERY

## 2017-11-20 PROCEDURE — S2900 ROBOTIC SURGICAL SYSTEM: HCPCS | Performed by: SURGERY

## 2017-11-20 RX ORDER — SODIUM CHLORIDE 9 MG/ML
INJECTION, SOLUTION INTRAVENOUS CONTINUOUS
Status: DISCONTINUED | OUTPATIENT
Start: 2017-11-20 | End: 2017-11-21 | Stop reason: HOSPADM

## 2017-11-20 RX ORDER — SODIUM CHLORIDE 0.9 % (FLUSH) 0.9 %
10 SYRINGE (ML) INJECTION EVERY 12 HOURS SCHEDULED
Status: DISCONTINUED | OUTPATIENT
Start: 2017-11-20 | End: 2017-11-20 | Stop reason: HOSPADM

## 2017-11-20 RX ORDER — MORPHINE SULFATE 4 MG/ML
4 INJECTION, SOLUTION INTRAMUSCULAR; INTRAVENOUS
Status: DISCONTINUED | OUTPATIENT
Start: 2017-11-20 | End: 2017-11-21 | Stop reason: HOSPADM

## 2017-11-20 RX ORDER — SCOLOPAMINE TRANSDERMAL SYSTEM 1 MG/1
1 PATCH, EXTENDED RELEASE TRANSDERMAL
Status: DISCONTINUED | OUTPATIENT
Start: 2017-11-20 | End: 2017-11-20

## 2017-11-20 RX ORDER — ONDANSETRON 4 MG/1
4 TABLET, FILM COATED ORAL EVERY 4 HOURS PRN
Qty: 30 TABLET | Refills: 0 | Status: SHIPPED | OUTPATIENT
Start: 2017-11-20 | End: 2017-12-04

## 2017-11-20 RX ORDER — HYOSCYAMINE SULFATE 0.125 MG
125 TABLET,DISINTEGRATING ORAL EVERY 4 HOURS PRN
Status: DISCONTINUED | OUTPATIENT
Start: 2017-11-20 | End: 2017-11-21 | Stop reason: HOSPADM

## 2017-11-20 RX ORDER — MORPHINE SULFATE 2 MG/ML
2 INJECTION, SOLUTION INTRAMUSCULAR; INTRAVENOUS EVERY 5 MIN PRN
Status: DISCONTINUED | OUTPATIENT
Start: 2017-11-20 | End: 2017-11-20 | Stop reason: HOSPADM

## 2017-11-20 RX ORDER — METOCLOPRAMIDE 10 MG/1
10 TABLET ORAL EVERY 6 HOURS PRN
Qty: 30 TABLET | Refills: 0 | Status: SHIPPED | OUTPATIENT
Start: 2017-11-20 | End: 2017-12-04

## 2017-11-20 RX ORDER — PROPOFOL 10 MG/ML
INJECTION, EMULSION INTRAVENOUS PRN
Status: DISCONTINUED | OUTPATIENT
Start: 2017-11-20 | End: 2017-11-20 | Stop reason: SDUPTHER

## 2017-11-20 RX ORDER — METOCLOPRAMIDE HYDROCHLORIDE 5 MG/ML
10 INJECTION INTRAMUSCULAR; INTRAVENOUS EVERY 6 HOURS PRN
Status: DISCONTINUED | OUTPATIENT
Start: 2017-11-20 | End: 2017-11-21 | Stop reason: HOSPADM

## 2017-11-20 RX ORDER — SUCCINYLCHOLINE CHLORIDE 20 MG/ML
INJECTION INTRAMUSCULAR; INTRAVENOUS PRN
Status: DISCONTINUED | OUTPATIENT
Start: 2017-11-20 | End: 2017-11-20 | Stop reason: SDUPTHER

## 2017-11-20 RX ORDER — BUPIVACAINE HYDROCHLORIDE AND EPINEPHRINE 5; 5 MG/ML; UG/ML
INJECTION, SOLUTION EPIDURAL; INTRACAUDAL; PERINEURAL PRN
Status: DISCONTINUED | OUTPATIENT
Start: 2017-11-20 | End: 2017-11-20 | Stop reason: HOSPADM

## 2017-11-20 RX ORDER — SCOLOPAMINE TRANSDERMAL SYSTEM 1 MG/1
1 PATCH, EXTENDED RELEASE TRANSDERMAL
Status: DISCONTINUED | OUTPATIENT
Start: 2017-11-23 | End: 2017-11-21 | Stop reason: HOSPADM

## 2017-11-20 RX ORDER — PROMETHAZINE HYDROCHLORIDE 25 MG/1
25 SUPPOSITORY RECTAL EVERY 6 HOURS PRN
Status: DISCONTINUED | OUTPATIENT
Start: 2017-11-20 | End: 2017-11-21 | Stop reason: HOSPADM

## 2017-11-20 RX ORDER — ACETAMINOPHEN 325 MG/1
650 TABLET ORAL EVERY 4 HOURS PRN
Status: DISCONTINUED | OUTPATIENT
Start: 2017-11-20 | End: 2017-11-21 | Stop reason: HOSPADM

## 2017-11-20 RX ORDER — ROCURONIUM BROMIDE 10 MG/ML
INJECTION, SOLUTION INTRAVENOUS PRN
Status: DISCONTINUED | OUTPATIENT
Start: 2017-11-20 | End: 2017-11-20 | Stop reason: SDUPTHER

## 2017-11-20 RX ORDER — MORPHINE SULFATE 2 MG/ML
2 INJECTION, SOLUTION INTRAMUSCULAR; INTRAVENOUS
Status: DISCONTINUED | OUTPATIENT
Start: 2017-11-20 | End: 2017-11-21 | Stop reason: HOSPADM

## 2017-11-20 RX ORDER — GLYCOPYRROLATE 0.2 MG/ML
INJECTION INTRAMUSCULAR; INTRAVENOUS PRN
Status: DISCONTINUED | OUTPATIENT
Start: 2017-11-20 | End: 2017-11-20 | Stop reason: SDUPTHER

## 2017-11-20 RX ORDER — SCOLOPAMINE TRANSDERMAL SYSTEM 1 MG/1
1 PATCH, EXTENDED RELEASE TRANSDERMAL
Status: DISCONTINUED | OUTPATIENT
Start: 2017-11-20 | End: 2017-11-20 | Stop reason: CLARIF

## 2017-11-20 RX ORDER — SODIUM CHLORIDE 0.9 % (FLUSH) 0.9 %
10 SYRINGE (ML) INJECTION EVERY 12 HOURS SCHEDULED
Status: DISCONTINUED | OUTPATIENT
Start: 2017-11-20 | End: 2017-11-21 | Stop reason: HOSPADM

## 2017-11-20 RX ORDER — SODIUM CHLORIDE 9 MG/ML
INJECTION, SOLUTION INTRAVENOUS CONTINUOUS
Status: DISCONTINUED | OUTPATIENT
Start: 2017-11-20 | End: 2017-11-20

## 2017-11-20 RX ORDER — MIDAZOLAM HYDROCHLORIDE 1 MG/ML
INJECTION INTRAMUSCULAR; INTRAVENOUS PRN
Status: DISCONTINUED | OUTPATIENT
Start: 2017-11-20 | End: 2017-11-20 | Stop reason: SDUPTHER

## 2017-11-20 RX ORDER — SODIUM CHLORIDE 0.9 % (FLUSH) 0.9 %
10 SYRINGE (ML) INJECTION PRN
Status: DISCONTINUED | OUTPATIENT
Start: 2017-11-20 | End: 2017-11-21 | Stop reason: HOSPADM

## 2017-11-20 RX ORDER — FENTANYL CITRATE 50 UG/ML
25 INJECTION, SOLUTION INTRAMUSCULAR; INTRAVENOUS EVERY 5 MIN PRN
Status: COMPLETED | OUTPATIENT
Start: 2017-11-20 | End: 2017-11-20

## 2017-11-20 RX ORDER — ONDANSETRON 2 MG/ML
4 INJECTION INTRAMUSCULAR; INTRAVENOUS EVERY 6 HOURS PRN
Status: DISCONTINUED | OUTPATIENT
Start: 2017-11-20 | End: 2017-11-21 | Stop reason: HOSPADM

## 2017-11-20 RX ORDER — FENTANYL CITRATE 50 UG/ML
INJECTION, SOLUTION INTRAMUSCULAR; INTRAVENOUS PRN
Status: DISCONTINUED | OUTPATIENT
Start: 2017-11-20 | End: 2017-11-20 | Stop reason: SDUPTHER

## 2017-11-20 RX ORDER — KETOROLAC TROMETHAMINE 30 MG/ML
INJECTION, SOLUTION INTRAMUSCULAR; INTRAVENOUS PRN
Status: DISCONTINUED | OUTPATIENT
Start: 2017-11-20 | End: 2017-11-20 | Stop reason: SDUPTHER

## 2017-11-20 RX ORDER — LIDOCAINE HYDROCHLORIDE 20 MG/ML
INJECTION, SOLUTION INFILTRATION; PERINEURAL PRN
Status: DISCONTINUED | OUTPATIENT
Start: 2017-11-20 | End: 2017-11-20 | Stop reason: SDUPTHER

## 2017-11-20 RX ORDER — OMEPRAZOLE 40 MG/1
40 CAPSULE, DELAYED RELEASE ORAL DAILY
Qty: 30 CAPSULE | Refills: 2 | Status: SHIPPED | OUTPATIENT
Start: 2017-11-20 | End: 2017-11-21 | Stop reason: HOSPADM

## 2017-11-20 RX ORDER — ONDANSETRON 2 MG/ML
4 INJECTION INTRAMUSCULAR; INTRAVENOUS ONCE
Status: COMPLETED | OUTPATIENT
Start: 2017-11-20 | End: 2017-11-20

## 2017-11-20 RX ORDER — FENTANYL CITRATE 50 UG/ML
50 INJECTION, SOLUTION INTRAMUSCULAR; INTRAVENOUS EVERY 5 MIN PRN
Status: DISCONTINUED | OUTPATIENT
Start: 2017-11-20 | End: 2017-11-20 | Stop reason: HOSPADM

## 2017-11-20 RX ORDER — SODIUM CHLORIDE 0.9 % (FLUSH) 0.9 %
10 SYRINGE (ML) INJECTION PRN
Status: DISCONTINUED | OUTPATIENT
Start: 2017-11-20 | End: 2017-11-20 | Stop reason: HOSPADM

## 2017-11-20 RX ORDER — KETOROLAC TROMETHAMINE 30 MG/ML
30 INJECTION, SOLUTION INTRAMUSCULAR; INTRAVENOUS EVERY 6 HOURS PRN
Status: DISCONTINUED | OUTPATIENT
Start: 2017-11-20 | End: 2017-11-21 | Stop reason: HOSPADM

## 2017-11-20 RX ORDER — DEXTROSE, SODIUM CHLORIDE, SODIUM LACTATE, POTASSIUM CHLORIDE, AND CALCIUM CHLORIDE 5; .6; .31; .03; .02 G/100ML; G/100ML; G/100ML; G/100ML; G/100ML
INJECTION, SOLUTION INTRAVENOUS CONTINUOUS
Status: DISCONTINUED | OUTPATIENT
Start: 2017-11-20 | End: 2017-11-20

## 2017-11-20 RX ORDER — ONDANSETRON 2 MG/ML
INJECTION INTRAMUSCULAR; INTRAVENOUS PRN
Status: DISCONTINUED | OUTPATIENT
Start: 2017-11-20 | End: 2017-11-20 | Stop reason: SDUPTHER

## 2017-11-20 RX ORDER — ASPIRIN 81 MG
100 TABLET, DELAYED RELEASE (ENTERIC COATED) ORAL 2 TIMES DAILY
Qty: 30 TABLET | Refills: 1 | Status: SHIPPED | OUTPATIENT
Start: 2017-11-20 | End: 2017-12-04

## 2017-11-20 RX ORDER — LABETALOL HYDROCHLORIDE 5 MG/ML
10 INJECTION, SOLUTION INTRAVENOUS EVERY 10 MIN PRN
Status: DISCONTINUED | OUTPATIENT
Start: 2017-11-20 | End: 2017-11-20 | Stop reason: HOSPADM

## 2017-11-20 RX ORDER — DEXAMETHASONE SODIUM PHOSPHATE 4 MG/ML
INJECTION, SOLUTION INTRA-ARTICULAR; INTRALESIONAL; INTRAMUSCULAR; INTRAVENOUS; SOFT TISSUE PRN
Status: DISCONTINUED | OUTPATIENT
Start: 2017-11-20 | End: 2017-11-20 | Stop reason: SDUPTHER

## 2017-11-20 RX ADMIN — FENTANYL CITRATE 25 MCG: 50 INJECTION INTRAMUSCULAR; INTRAVENOUS at 11:40

## 2017-11-20 RX ADMIN — ROCURONIUM BROMIDE 50 MG: 10 INJECTION INTRAVENOUS at 10:10

## 2017-11-20 RX ADMIN — FENTANYL CITRATE 50 MCG: 50 INJECTION INTRAMUSCULAR; INTRAVENOUS at 11:30

## 2017-11-20 RX ADMIN — DEXAMETHASONE SODIUM PHOSPHATE 8 MG: 4 INJECTION, SOLUTION INTRAMUSCULAR; INTRAVENOUS at 10:15

## 2017-11-20 RX ADMIN — SODIUM CHLORIDE: 9 INJECTION, SOLUTION INTRAVENOUS at 21:45

## 2017-11-20 RX ADMIN — MIDAZOLAM 1 MG: 1 INJECTION INTRAMUSCULAR; INTRAVENOUS at 09:59

## 2017-11-20 RX ADMIN — MORPHINE SULFATE 2 MG: 2 INJECTION, SOLUTION INTRAMUSCULAR; INTRAVENOUS at 17:45

## 2017-11-20 RX ADMIN — MORPHINE SULFATE 2 MG: 2 INJECTION, SOLUTION INTRAMUSCULAR; INTRAVENOUS at 20:37

## 2017-11-20 RX ADMIN — MIDAZOLAM 1 MG: 1 INJECTION INTRAMUSCULAR; INTRAVENOUS at 09:50

## 2017-11-20 RX ADMIN — LIDOCAINE HYDROCHLORIDE 100 MG: 20 INJECTION, SOLUTION EPIDURAL; INFILTRATION; INTRACAUDAL; PERINEURAL at 10:01

## 2017-11-20 RX ADMIN — FENTANYL CITRATE 25 MCG: 50 INJECTION INTRAMUSCULAR; INTRAVENOUS at 11:13

## 2017-11-20 RX ADMIN — KETOROLAC TROMETHAMINE 30 MG: 30 INJECTION, SOLUTION INTRAMUSCULAR at 21:44

## 2017-11-20 RX ADMIN — SODIUM CHLORIDE: 9 INJECTION, SOLUTION INTRAVENOUS at 07:56

## 2017-11-20 RX ADMIN — PROPOFOL 200 MG: 10 INJECTION, EMULSION INTRAVENOUS at 10:01

## 2017-11-20 RX ADMIN — MORPHINE SULFATE 2 MG: 2 INJECTION, SOLUTION INTRAMUSCULAR; INTRAVENOUS at 12:20

## 2017-11-20 RX ADMIN — FENTANYL CITRATE 25 MCG: 50 INJECTION INTRAMUSCULAR; INTRAVENOUS at 12:10

## 2017-11-20 RX ADMIN — FENTANYL CITRATE 25 MCG: 50 INJECTION INTRAMUSCULAR; INTRAVENOUS at 11:16

## 2017-11-20 RX ADMIN — KETOROLAC TROMETHAMINE 30 MG: 30 INJECTION, SOLUTION INTRAMUSCULAR at 11:05

## 2017-11-20 RX ADMIN — MORPHINE SULFATE 2 MG: 2 INJECTION, SOLUTION INTRAMUSCULAR; INTRAVENOUS at 22:52

## 2017-11-20 RX ADMIN — KETOROLAC TROMETHAMINE 30 MG: 30 INJECTION, SOLUTION INTRAMUSCULAR at 15:07

## 2017-11-20 RX ADMIN — FENTANYL CITRATE 25 MCG: 50 INJECTION INTRAMUSCULAR; INTRAVENOUS at 11:50

## 2017-11-20 RX ADMIN — FENTANYL CITRATE 25 MCG: 50 INJECTION INTRAMUSCULAR; INTRAVENOUS at 12:05

## 2017-11-20 RX ADMIN — FENTANYL CITRATE 50 MCG: 50 INJECTION INTRAMUSCULAR; INTRAVENOUS at 09:50

## 2017-11-20 RX ADMIN — SUCCINYLCHOLINE CHLORIDE 140 MG: 20 INJECTION, SOLUTION INTRAMUSCULAR; INTRAVENOUS at 10:01

## 2017-11-20 RX ADMIN — FENTANYL CITRATE 25 MCG: 50 INJECTION INTRAMUSCULAR; INTRAVENOUS at 12:00

## 2017-11-20 RX ADMIN — FENTANYL CITRATE 50 MCG: 50 INJECTION INTRAMUSCULAR; INTRAVENOUS at 10:01

## 2017-11-20 RX ADMIN — SODIUM CHLORIDE: 9 INJECTION, SOLUTION INTRAVENOUS at 10:35

## 2017-11-20 RX ADMIN — FENTANYL CITRATE 25 MCG: 50 INJECTION INTRAMUSCULAR; INTRAVENOUS at 11:45

## 2017-11-20 RX ADMIN — FAMOTIDINE 20 MG: 10 INJECTION, SOLUTION INTRAVENOUS at 20:37

## 2017-11-20 RX ADMIN — ONDANSETRON 4 MG: 2 INJECTION INTRAMUSCULAR; INTRAVENOUS at 07:59

## 2017-11-20 RX ADMIN — GLYCOPYRROLATE 0.1 MG: 0.2 INJECTION, SOLUTION INTRAMUSCULAR; INTRAVENOUS at 09:50

## 2017-11-20 RX ADMIN — FENTANYL CITRATE 25 MCG: 50 INJECTION INTRAMUSCULAR; INTRAVENOUS at 12:15

## 2017-11-20 RX ADMIN — HYOSCYAMINE SULFATE 125 MCG: 0.12 TABLET, ORALLY DISINTEGRATING ORAL at 22:52

## 2017-11-20 RX ADMIN — FAMOTIDINE 20 MG: 10 INJECTION, SOLUTION INTRAVENOUS at 07:57

## 2017-11-20 RX ADMIN — FENTANYL CITRATE 25 MCG: 50 INJECTION INTRAMUSCULAR; INTRAVENOUS at 11:35

## 2017-11-20 RX ADMIN — SUGAMMADEX 300 MG: 100 INJECTION, SOLUTION INTRAVENOUS at 11:07

## 2017-11-20 RX ADMIN — WATER 2 G: 1 INJECTION INTRAMUSCULAR; INTRAVENOUS; SUBCUTANEOUS at 10:05

## 2017-11-20 RX ADMIN — ONDANSETRON 4 MG: 2 INJECTION INTRAMUSCULAR; INTRAVENOUS at 10:59

## 2017-11-20 ASSESSMENT — PULMONARY FUNCTION TESTS
PIF_VALUE: 28
PIF_VALUE: 0
PIF_VALUE: 27
PIF_VALUE: 14
PIF_VALUE: 30
PIF_VALUE: 32
PIF_VALUE: 25
PIF_VALUE: 30
PIF_VALUE: 29
PIF_VALUE: 14
PIF_VALUE: 1
PIF_VALUE: 28
PIF_VALUE: 29
PIF_VALUE: 30
PIF_VALUE: 30
PIF_VALUE: 26
PIF_VALUE: 29
PIF_VALUE: 30
PIF_VALUE: 30
PIF_VALUE: 2
PIF_VALUE: 25
PIF_VALUE: 0
PIF_VALUE: 1
PIF_VALUE: 4
PIF_VALUE: 14
PIF_VALUE: 30
PIF_VALUE: 26
PIF_VALUE: 29
PIF_VALUE: 14
PIF_VALUE: 14
PIF_VALUE: 25
PIF_VALUE: 30
PIF_VALUE: 26
PIF_VALUE: 4
PIF_VALUE: 26
PIF_VALUE: 30
PIF_VALUE: 33
PIF_VALUE: 27
PIF_VALUE: 0
PIF_VALUE: 29
PIF_VALUE: 30
PIF_VALUE: 32
PIF_VALUE: 12
PIF_VALUE: 14
PIF_VALUE: 29
PIF_VALUE: 25
PIF_VALUE: 2
PIF_VALUE: 25
PIF_VALUE: 21
PIF_VALUE: 30
PIF_VALUE: 14
PIF_VALUE: 29
PIF_VALUE: 14
PIF_VALUE: 29
PIF_VALUE: 30
PIF_VALUE: 30
PIF_VALUE: 32
PIF_VALUE: 14
PIF_VALUE: 33
PIF_VALUE: 1
PIF_VALUE: 27
PIF_VALUE: 29
PIF_VALUE: 29
PIF_VALUE: 25
PIF_VALUE: 32
PIF_VALUE: 0
PIF_VALUE: 29
PIF_VALUE: 30
PIF_VALUE: 27
PIF_VALUE: 13
PIF_VALUE: 13
PIF_VALUE: 14
PIF_VALUE: 29
PIF_VALUE: 1
PIF_VALUE: 28
PIF_VALUE: 27
PIF_VALUE: 29
PIF_VALUE: 26
PIF_VALUE: 30
PIF_VALUE: 1
PIF_VALUE: 14
PIF_VALUE: 27
PIF_VALUE: 30
PIF_VALUE: 29
PIF_VALUE: 24
PIF_VALUE: 24
PIF_VALUE: 13
PIF_VALUE: 26
PIF_VALUE: 29

## 2017-11-20 ASSESSMENT — PAIN SCALES - GENERAL
PAINLEVEL_OUTOF10: 5
PAINLEVEL_OUTOF10: 7
PAINLEVEL_OUTOF10: 5
PAINLEVEL_OUTOF10: 9
PAINLEVEL_OUTOF10: 5
PAINLEVEL_OUTOF10: 9
PAINLEVEL_OUTOF10: 6
PAINLEVEL_OUTOF10: 7
PAINLEVEL_OUTOF10: 6
PAINLEVEL_OUTOF10: 7
PAINLEVEL_OUTOF10: 0
PAINLEVEL_OUTOF10: 6

## 2017-11-20 ASSESSMENT — PAIN DESCRIPTION - PAIN TYPE
TYPE: SURGICAL PAIN
TYPE: SURGICAL PAIN

## 2017-11-20 ASSESSMENT — PAIN DESCRIPTION - LOCATION
LOCATION: ABDOMEN
LOCATION: ABDOMEN

## 2017-11-20 NOTE — INTERVAL H&P NOTE
[address] [address]  History and Physical Update    Pt Name: Johanny Ureña  MRN: [id number]  YOB: 1965  Date of evaluation: 11/20/2017    [x] I have examined the patient and reviewed the H&P/Consult and there are no changes to the patient or plans.     [] I have examined the patient and reviewed the H&P/Consult and have noted the following changes:        Massimo Senior  Electronically signed 11/20/2017 at 9:45 AM

## 2017-11-20 NOTE — PROGRESS NOTES
Patient arrived to [facility] at 1315 via transport. Family at bedside. Burks intact. On 4L O2. IV infusing 0.9 N.S 1.25ML/hr in left hand. Rating pain a 7 on scale 0-10. 6 Surgical incision to abdomen. Dressing is clean,dry and intact. Abdominal binder. Oriented patient to room and call light.

## 2017-11-20 NOTE — PROGRESS NOTES
Oriented to sds 15        gave information on flu and pneumonia vaccine. Refuses flu and pneumonia vaccine. Family updated to register with volunteer out at . Informed family to take belonging with them. Keep nothing of value in room unattended. Medication given back to family. Family is taking them with them.

## 2017-11-20 NOTE — ANESTHESIA PRE PROCEDURE
Department of Anesthesiology  Preprocedure Note       Name:  Deysi Carcamo   Age:  46 y.o.  :  1965                                          MRN:  [id number]         Date:  2017      Surgeon: Ro Manzanares):  Kieran Edmonds MD    Procedure: Procedure(s):  ROBOTIC SLEEVE GASTRECTOMY    Medications prior to admission:   Prior to Admission medications    Medication Sig Start Date End Date Taking? Authorizing Provider   Multiple Vitamin (MVI, BARIATRIC ADVANTAGE MULTI-FORMULA, CHEW TAB) Take 1 tablet by mouth 2 times daily   Yes Historical Provider, MD   Cyanocobalamin (VITAMIN B-12 SL) Place 1 tablet under the tongue daily   Yes Historical Provider, MD   Calcium Citrate-Vitamin D (CALCIUM + VIT D, BARIATRIC ADVANTAGE, CHEWABLE TABLET) Take 1 tablet by mouth 3 times daily   Yes Historical Provider, MD   omeprazole (PRILOSEC) 20 MG delayed release capsule Take 20 mg by mouth daily   Yes Historical Provider, MD   citalopram (CELEXA) 20 MG tablet Take 20 mg by mouth daily. Yes Historical Provider, MD   hydrochlorothiazide (HYDRODIURIL) 25 MG tablet Take 25 mg by mouth daily. DO NOT TAKE AM OF SURGERY    Yes Historical Provider, MD   therapeutic multivitamin-minerals (THERAGRAN-M) tablet Take 1 tablet by mouth daily.  STOP NOW FOR SURGERY    Yes Historical Provider, MD       Current medications:    Current Facility-Administered Medications   Medication Dose Route Frequency Provider Last Rate Last Dose    sodium chloride flush 0.9 % injection 10 mL  10 mL Intravenous 2 times per day Kieran Edmonds MD        sodium chloride flush 0.9 % injection 10 mL  10 mL Intravenous PRN Kieran Edmonds MD        0.9 % sodium chloride infusion   Intravenous Continuous Kieran Edmonds MD        cefOXitin (MEFOXIN) 2 g in sterile water 20 mL IV syringe  2 g Intravenous On Call to [address] Sergey Cote MD        scopolamine (TRANSDERM-SCOP) transdermal patch 1 patch  1 patch Transdermal Q72H Naomy Osullivan Robinson Perez MD   11/20/2017

## 2017-11-20 NOTE — ANESTHESIA POSTPROCEDURE EVALUATION
Department of Anesthesiology  Postprocedure Note    Patient: Brian Harper  MRN: [id number]  YOB: 1965  Date of evaluation: 11/20/2017  Time:  12:23 PM     Procedure Summary     Date:  11/20/17 Room / Location:  [address] [place] [address] MIGUELITO Hugo    Anesthesia Start:  0955 Anesthesia Stop:  1137    Procedure:  ROBOTIC SLEEVE GASTRECTOMY (N/A Abdomen) Diagnosis:  (OBESITY)    Surgeon:  Emeterio Guthrie MD Responsible Provider:  Opal Gomes MD    Anesthesia Type:  general ASA Status:  3          Anesthesia Type: general    Luis Phase I: Luis Score: 8    Luis Phase II:      Last vitals: Reviewed and per EMR flowsheets.        Anesthesia Post Evaluation    Patient location during evaluation: PACU  Patient participation: complete - patient participated  Level of consciousness: awake  Airway patency: patent  Nausea & Vomiting: no vomiting and no nausea  Complications: no  Cardiovascular status: hemodynamically stable  Respiratory status: acceptable  Hydration status: stable

## 2017-11-21 ENCOUNTER — APPOINTMENT (OUTPATIENT)
Dept: GENERAL RADIOLOGY | Age: 52
DRG: 621 | End: 2017-11-21
Attending: SURGERY
Payer: COMMERCIAL

## 2017-11-21 VITALS
DIASTOLIC BLOOD PRESSURE: 64 MMHG | SYSTOLIC BLOOD PRESSURE: 129 MMHG | WEIGHT: 293 LBS | HEIGHT: 65 IN | OXYGEN SATURATION: 99 % | HEART RATE: 66 BPM | BODY MASS INDEX: 48.82 KG/M2 | TEMPERATURE: 97.8 F | RESPIRATION RATE: 16 BRPM

## 2017-11-21 LAB
ANION GAP SERPL CALCULATED.3IONS-SCNC: 15 MEQ/L (ref 8–16)
BUN BLDV-MCNC: 25 MG/DL (ref 7–22)
CALCIUM SERPL-MCNC: 8.5 MG/DL (ref 8.5–10.5)
CHLORIDE BLD-SCNC: 101 MEQ/L (ref 98–111)
CO2: 27 MEQ/L (ref 23–33)
CREAT SERPL-MCNC: 0.8 MG/DL (ref 0.4–1.2)
GFR SERPL CREATININE-BSD FRML MDRD: 75 ML/MIN/1.73M2
GLUCOSE BLD-MCNC: 104 MG/DL (ref 70–108)
HCT VFR BLD CALC: 37.4 % (ref 37–47)
HEMOGLOBIN: 12.5 GM/DL (ref 12–16)
POTASSIUM SERPL-SCNC: 2.7 MEQ/L (ref 3.5–5.2)
POTASSIUM SERPL-SCNC: 3.8 MEQ/L (ref 3.5–5.2)
SODIUM BLD-SCNC: 143 MEQ/L (ref 135–145)

## 2017-11-21 PROCEDURE — S0028 INJECTION, FAMOTIDINE, 20 MG: HCPCS | Performed by: SURGERY

## 2017-11-21 PROCEDURE — 2580000003 HC RX 258

## 2017-11-21 PROCEDURE — 80048 BASIC METABOLIC PNL TOTAL CA: CPT

## 2017-11-21 PROCEDURE — 6370000000 HC RX 637 (ALT 250 FOR IP): Performed by: SURGERY

## 2017-11-21 PROCEDURE — 2500000003 HC RX 250 WO HCPCS: Performed by: SURGERY

## 2017-11-21 PROCEDURE — 2700000000 HC OXYGEN THERAPY PER DAY

## 2017-11-21 PROCEDURE — 6360000004 HC RX CONTRAST MEDICATION: Performed by: SURGERY

## 2017-11-21 PROCEDURE — 84132 ASSAY OF SERUM POTASSIUM: CPT

## 2017-11-21 PROCEDURE — 6370000000 HC RX 637 (ALT 250 FOR IP): Performed by: NURSE PRACTITIONER

## 2017-11-21 PROCEDURE — 99024 POSTOP FOLLOW-UP VISIT: CPT | Performed by: NURSE PRACTITIONER

## 2017-11-21 PROCEDURE — 85018 HEMOGLOBIN: CPT

## 2017-11-21 PROCEDURE — 2580000003 HC RX 258: Performed by: SURGERY

## 2017-11-21 PROCEDURE — 6360000002 HC RX W HCPCS: Performed by: SURGERY

## 2017-11-21 PROCEDURE — 36415 COLL VENOUS BLD VENIPUNCTURE: CPT

## 2017-11-21 PROCEDURE — 74240 X-RAY XM UPR GI TRC 1CNTRST: CPT

## 2017-11-21 PROCEDURE — 6360000002 HC RX W HCPCS

## 2017-11-21 PROCEDURE — 85014 HEMATOCRIT: CPT

## 2017-11-21 RX ORDER — CITALOPRAM 20 MG/1
20 TABLET ORAL DAILY
Status: DISCONTINUED | OUTPATIENT
Start: 2017-11-21 | End: 2017-11-21 | Stop reason: HOSPADM

## 2017-11-21 RX ORDER — OXYCODONE HYDROCHLORIDE 5 MG/1
5 TABLET ORAL EVERY 6 HOURS PRN
Qty: 30 TABLET | Refills: 0 | Status: SHIPPED | OUTPATIENT
Start: 2017-11-21 | End: 2017-11-28

## 2017-11-21 RX ORDER — 0.9 % SODIUM CHLORIDE 0.9 %
500 INTRAVENOUS SOLUTION INTRAVENOUS ONCE
Status: COMPLETED | OUTPATIENT
Start: 2017-11-21 | End: 2017-11-21

## 2017-11-21 RX ORDER — KETOROLAC TROMETHAMINE 10 MG/1
10 TABLET, FILM COATED ORAL EVERY 6 HOURS PRN
Qty: 20 TABLET | Refills: 0 | Status: SHIPPED | OUTPATIENT
Start: 2017-11-21 | End: 2018-01-02

## 2017-11-21 RX ORDER — HYOSCYAMINE SULFATE 0.125 MG
125 TABLET,DISINTEGRATING ORAL EVERY 4 HOURS PRN
Qty: 40 TABLET | Refills: 0 | Status: SHIPPED | OUTPATIENT
Start: 2017-11-21 | End: 2017-12-04

## 2017-11-21 RX ORDER — OXYCODONE HYDROCHLORIDE 5 MG/1
5 TABLET ORAL EVERY 4 HOURS PRN
Status: DISCONTINUED | OUTPATIENT
Start: 2017-11-21 | End: 2017-11-21 | Stop reason: HOSPADM

## 2017-11-21 RX ADMIN — BARIUM SULFATE 60 ML: 0.6 SUSPENSION ORAL at 08:21

## 2017-11-21 RX ADMIN — CITALOPRAM 20 MG: 20 TABLET, FILM COATED ORAL at 10:52

## 2017-11-21 RX ADMIN — KETOROLAC TROMETHAMINE 30 MG: 30 INJECTION, SOLUTION INTRAMUSCULAR at 17:13

## 2017-11-21 RX ADMIN — HYOSCYAMINE SULFATE 125 MCG: 0.12 TABLET, ORALLY DISINTEGRATING ORAL at 15:07

## 2017-11-21 RX ADMIN — POTASSIUM CHLORIDE 40 MEQ: 2 INJECTION, SOLUTION, CONCENTRATE INTRAVENOUS at 09:36

## 2017-11-21 RX ADMIN — ENOXAPARIN SODIUM 40 MG: 40 INJECTION SUBCUTANEOUS at 09:35

## 2017-11-21 RX ADMIN — FAMOTIDINE 20 MG: 10 INJECTION, SOLUTION INTRAVENOUS at 09:35

## 2017-11-21 RX ADMIN — POTASSIUM CHLORIDE 20 MEQ: 2 INJECTION, SOLUTION, CONCENTRATE INTRAVENOUS at 15:01

## 2017-11-21 RX ADMIN — HYOSCYAMINE SULFATE 125 MCG: 0.12 TABLET, ORALLY DISINTEGRATING ORAL at 03:44

## 2017-11-21 RX ADMIN — MORPHINE SULFATE 2 MG: 2 INJECTION, SOLUTION INTRAMUSCULAR; INTRAVENOUS at 06:21

## 2017-11-21 RX ADMIN — DIATRIZOATE MEGLUMINE AND DIATRIZOATE SODIUM 30 ML: 600; 100 SOLUTION ORAL; RECTAL at 08:22

## 2017-11-21 RX ADMIN — KETOROLAC TROMETHAMINE 30 MG: 30 INJECTION, SOLUTION INTRAMUSCULAR at 10:23

## 2017-11-21 RX ADMIN — MORPHINE SULFATE 2 MG: 2 INJECTION, SOLUTION INTRAMUSCULAR; INTRAVENOUS at 01:07

## 2017-11-21 RX ADMIN — SODIUM CHLORIDE 500 ML: 9 INJECTION, SOLUTION INTRAVENOUS at 03:52

## 2017-11-21 RX ADMIN — KETOROLAC TROMETHAMINE 30 MG: 30 INJECTION, SOLUTION INTRAMUSCULAR at 03:43

## 2017-11-21 RX ADMIN — HYOSCYAMINE SULFATE 125 MCG: 0.12 TABLET, ORALLY DISINTEGRATING ORAL at 09:35

## 2017-11-21 ASSESSMENT — PAIN SCALES - GENERAL
PAINLEVEL_OUTOF10: 2
PAINLEVEL_OUTOF10: 5
PAINLEVEL_OUTOF10: 4
PAINLEVEL_OUTOF10: 5
PAINLEVEL_OUTOF10: 5
PAINLEVEL_OUTOF10: 7

## 2017-11-21 ASSESSMENT — PAIN DESCRIPTION - LOCATION
LOCATION: ABDOMEN

## 2017-11-21 ASSESSMENT — PAIN DESCRIPTION - PAIN TYPE
TYPE: SURGICAL PAIN

## 2017-11-21 ASSESSMENT — PAIN DESCRIPTION - DESCRIPTORS
DESCRIPTORS: PRESSURE

## 2017-11-21 ASSESSMENT — PAIN DESCRIPTION - FREQUENCY
FREQUENCY: INTERMITTENT

## 2017-11-21 NOTE — PROGRESS NOTES
Edenilson Brown  Postoperative Bariatric Progress Note    Pt Name: Kenneth Gamble  Medical Record Number: [id number]  Date of Birth 1965   Today's Date: 11/21/2017    ASSESSMENT   1. POD # 1 S/p robotic assisted sleeve gastrectomy  2. Hypokalemia   3. Morbid obesity BMI 57  4. Hypertension    has a past medical history of GERD (gastroesophageal reflux disease); Hypertension; Infertility, female; Nausea & vomiting; and PONV (postoperative nausea and vomiting). PLAN   1. Upper GI reviewed. No evidence of leak or obstruction. 2. Okay to advance to sugar free clear liquids. Strict I&Os. 3. IV hydration  4. Remove villeda  5. DVT & GI prophylaxis  6. Routine incisional care  7. Pain & nausea control - Toradol working well. 8. Stool softeners  9. Ambulation  10. Okay to resume home medications except hydrochlorothiazide. Hold until 1 week appointment. Recommend to track blood pressures at home. 11. Labs reviewed. Replace K+ per protocol. Repeat K+ prior to discharge. 12. Patient doing well this morning. Would like to go home later this afternoon if she tolerates her liquids well. Okay to try oxycodone per patient. States she had a small rash reaction a long time ago with Percocet, but she is unsure if it was truly the medicine or not. Discharge instruction discussed. She is okay for discharge later tonight if tolerating diet well and pain controlled. SUBJECTIVE   Patient was stable overnight. Chart reviewed. Updated by nursing staff. Has some epigastric cramping, but better with Oscimin. No real nausea. No vomiting. (+) belching. No flatus. No BM. Tolerating Diet NPO Effective Now Exceptions are: Ice Chips, Sips with Meds diet. Pain controlled with Toradol. Up ad jerson. Villeda to be removed. Incisions are clean, dry and intact. Blood pressures are stable - no hypertension. Hold hydrochlorothiazide for now. Monitor K+ levels.   CURRENT MEDICATIONS   Scheduled Meds:   potassium replacement protocol   Other RX Placeholder    potassium chloride 40 mEq in sodium chloride 0.9% 500 mL IVPB  40 mEq Intravenous Once    Followed by    potassium chloride 20 mEq in sodium chloride 0.9% 250 mL IVPB  20 mEq Intravenous Once    citalopram  20 mg Oral Daily    sodium chloride flush  10 mL Intravenous 2 times per day    enoxaparin  40 mg Subcutaneous Q12H    famotidine (PEPCID) injection  20 mg Intravenous BID    [START ON 2017] scopolamine  1 patch Transdermal Q72H     Continuous Infusions:   sodium chloride 125 mL/hr at 17 2145     PRN Meds:.diatrizoate meglumine-sodium, sodium chloride flush, acetaminophen, morphine **OR** morphine, ketorolac, metoclopramide, promethazine, ondansetron, hyoscyamine  OBJECTIVE   CURRENT VITALS:  height is 5' 5\" (1.651 m) and weight is 344 lb (156 kg) (abnormal). Her oral temperature is 97.4 °F (36.3 °C). Her blood pressure is 129/61 and her pulse is 65. Her respiration is 16 and oxygen saturation is 93%. Temperature Range (24h):Temp: 97.4 °F (36.3 °C) Temp  Av.6 °F (35.9 °C)  Min: 89.6 °F (32 °C)  Max: 99.3 °F (37.4 °C)  BP Range (50F): Systolic (06QXJ), CXQ:872 , Min:98 , OWD:907     Diastolic (57ZTM), DNT:24, Min:54, Max:138    Pulse Range (24h): Pulse  Av.9  Min: 65  Max: 96  Respiration Range (24h): Resp  Av.2  Min: 0  Max: 27  Current Pulse Ox (24h):  SpO2: 93 %  Pulse Ox Range (24h):  SpO2  Av.1 %  Min: 85 %  Max: 99 %  Oxygen Amount and Delivery: O2 Flow Rate (L/min): 2 L/min  Incentive Spirometry Tx:            GENERAL: alert, no distress  LUNGS: clear to ausculation, without wheezes, rales or rhonci  HEART: normal rate and regular rhythm  ABDOMEN: non-distended, soft, incisional tenderness, bowel sounds present in all 4 quadrants and no guarding or peritoneal signs  INCISION: healing well, no significant drainage, no significant erythema  EXTERMITY: no cyanosis, clubbing or edema    In: 3088. 8 esophagus. There are surgical changes in the stomach of a sleeve gastrectomy. There is normal transit of contrast through the stomach and into the duodenum. There is no extravasation of    contrast to suggest a leak. The duodenal bulb and sweep are normal.           Impression   No evidence of obstruction or leak following sleeve gastrectomy.       Final report electronically signed by Dr. Bentley Gale on 11/21/2017 8:42 AM         Electronically signed by Greg Jean CNP on 11/21/2017 at 10:06 AM     Patient seen and examined independently by me early this AM. Above discussed and I agree with Hector Ventura CNP. See my additional comments below for updated orders and plan. Labs, cultures, and radiographs where available were reviewed. I discussed patient concerns with the patient's nurse and instructions were given. Please see our orders for the updated patient care plan. - Upper GI okay. Clear liquid diet. Nausea control and pain control as needed. Remove Burks catheter. IV fluid hydration. DVT prophylaxis. Ambulate. Replace electrolytes. Hopefully home later today.     Electronically signed by Sinai Choi MD on 11/21/2017 at 12:12 PM

## 2017-11-21 NOTE — CARE COORDINATION
11/21/17, 9:20 AM      Ruba Del Toro       Admitted from: PACU 11/20/2017/ Odilon Steen day: 1   Location: [facility]- Reason for admit: Obesity [E66.9] Status: Inpatient  Admit order signed?: yes  PMH:  has a past medical history of GERD (gastroesophageal reflux disease); Hypertension; Infertility, female; Nausea & vomiting; and PONV (postoperative nausea and vomiting). Procedure: 11/20/17: Robotic sleeve gastrectomy by Dr Yenifer Melendez  Pertinent abnormal Imaging:no  Medications:  Scheduled Meds:   potassium replacement protocol   Other RX Placeholder    potassium chloride 40 mEq in sodium chloride 0.9% 500 mL IVPB  40 mEq Intravenous Once    Followed by    potassium chloride 20 mEq in sodium chloride 0.9% 250 mL IVPB  20 mEq Intravenous Once    sodium chloride flush  10 mL Intravenous 2 times per day    enoxaparin  40 mg Subcutaneous Q12H    famotidine (PEPCID) injection  20 mg Intravenous BID    [START ON 11/23/2017] scopolamine  1 patch Transdermal Q72H     Continuous Infusions:   sodium chloride 125 mL/hr at 11/20/17 2140      Pertinent Info/Orders/Treatment Plan: Awaiting results of FL UGI. I&O. Pain management. Ambulate in hallway every 2 hours with assist. Abdominal binder. Ice to abdomen. Diet: Diet NPO Effective Now Exceptions are: Ice Chips, Sips with Meds   DVT Prophylaxis: Lovenox  Smoking status:  reports that she has never smoked.  She has never used smokeless tobacco.   Influenza Vaccination Screening Completed: yes  Pneumonia Vaccination Screening Completed: yes  Core measures: vte  PCP: Yan Johnson MD  Readmission:   no  Risk Score: 6.5     Discharge Planning  Current Residence:  Private Residence  Living Arrangements:  Spouse/Significant Other   Support Systems:  Spouse/Significant Other  Current Services PTA:     Potential Assistance Needed:  Home Care  Potential Assistance Purchasing Medications:  No  Does patient want to participate in local refill/ meds to beds program? No  Type of Home Care Services:  Nursing Services  Patient expects to be discharged to:  Home with   Expected Discharge date:  11/21/17  Follow Up Appointment: Best Day/ Time: Wednesday PM    Discharge Plan: I spoke with Le Little and her . Planning home at discharge. Dr Clark Casillas ordered [facility] nursing visit. Le Little is in agreement with [facility] visit.  I called referral to [facility]     Evaluation: no

## 2017-11-21 NOTE — CARE COORDINATION
11/21/17, 2:30 PM    Discharge plan discussed by  and . Discharge plan reviewed with patient/ family. Patient/ family verbalize understanding of discharge plan and are in agreement with plan. Understanding was demonstrated using the teach back method.    Services After Discharge  Services At/After Discharge: Nursing Services         Planning home with  [facility] nursing - post-bariatric surgery visit

## 2017-11-21 NOTE — PLAN OF CARE
Problem: Pain:  Goal: Pain level will decrease  Pain level will decrease   Outcome: Ongoing  Pt report pain at 4 on scale. Pt states IV medication helping to achieve pain goal of a 4 on scale. Problem: Discharge Planning:  Goal: Discharged to appropriate level of care  Discharged to appropriate level of care   Pt plans home at discharge. Care manager helping with discharge needs. Problem: Bowel Function - Altered:  Goal: Bowel elimination is within specified parameters  Bowel elimination is within specified parameters   Outcome: Ongoing  Pt with bowel sounds, passing flatus, and without nausea. Taking prescribed medications to assist with BM. Problem: Infection - Surgical Site:  Goal: Will show no infection signs and symptoms  Will show no infection signs and symptoms   Outcome: Ongoing  Dressing dry and intact. Unable to visualize surgical incision due to surgical dressing. Dressing not to be removed today. No fevers, tachycardia, hypotension noted. Pt denies any complaints. Problem: Nutrition Deficit:  Goal: Ability to identify appropriate dietary choices will improve  Ability to identify appropriate dietary choices will improve   Outcome: Ongoing  Patient is progressing towards goals. Patient is drinking clear fluids without difficulty. Problem: Venous Thromboembolism:  Goal: Will show no signs or symptoms of venous thromboembolism  Will show no signs or symptoms of venous thromboembolism   Outcome: Ongoing  Pt without s/s of DVT. Pt. has SCD,S in place to help prevent development of DVT and is up ambulating every 2 hours during daytime hours. Comments: Care plan reviewed with patient. Patient verbalizes understanding of the plan of care and contributes to goal setting.

## 2017-11-22 NOTE — DISCHARGE SUMMARY
[facility]       Pt Name: Yann Durand  MRN: [id number]  YOB: 1965  Primary Care Physician: Huseyin العلي MD    Admit date:  11/20/2017  7:21 AM      Discharge date:  11/21/2017  6:26 PM    Admitting Diagnosis:   1. Morbid obesity. 2.  BMI 57.  3.  Hypertension    Discharge Diagnosis:   1. Status post sleeve gastrectomy  2. Hypokalemia - resolved  3. Morbid obesity  4. BMI 57  5. Hypertension    Admitting Service: General Surgery, Princess Gregg MD.    Consultants:  none    History and Physical:Patient ID: Love Becker a 46 y. o. female             Chief Complaint   Patient presents with    Follow-up       h&p visit; initial 365lb 12.8oz, last 363lb 12.8oz, current 346.4lb      HPI  Charli Rodriguez is a 63-year-old female who presents for follow-up at the weight management program secondary to her morbid obesity.  BMI 61.  She has not been successful with medical management for excess body weight loss.  She wishes to proceed with robotic sleeve gastrectomy. Jesusita Joy has completed upper endoscopy.  Complete colonoscopy.  Attended support groups.  Completed psychology evaluation.  Following recommendations given by the dietitians. Rajni Party to work on nutrition.  Trying to make better food selections and decreasing portion size.  Increasing walking but otherwise not much more with exercise/fitness.  Attended fitness orientation.  Denies any acute chest pain or shortness of breath.  No abdominal pain.  No new urinary complaints.  No hematochezia or melena.  Patient states she is ready to proceed with surgical intervention in hopes to lose increase excess body weight long-term.     Review of Systems   Constitutional: Negative.    HENT: Negative.    Eyes: Negative.    Respiratory: Negative.    Cardiovascular: Negative.    Gastrointestinal: Negative.    Endocrine: Negative.    Genitourinary: Negative.    Musculoskeletal: Positive for arthralgias and back pain. Negative for myalgias and neck pain. Skin: Negative.    Allergic/Immunologic: Negative.    Neurological: Negative.    Hematological: Negative.    Psychiatric/Behavioral: Negative.       Past Medical History           Past Medical History:   Diagnosis Date    GERD (gastroesophageal reflux disease)      Hypertension      Infertility, female      Nausea & vomiting      PONV (postoperative nausea and vomiting)               Past Surgical History             Past Surgical History:   Procedure Laterality Date    ABDOMEN SURGERY   2003     RT OOPHERECTOMY    COLONOSCOPY        DILATATION, ESOPHAGUS        LEG DEBRIDEMENT   2011     RT     TX COLON CA SCRN NOT HI RSK IND Left 10/25/2017     [facility]COPY performed [facility]    TX EGD TRANSORAL BIOPSY SINGLE/MULTIPLE Left 10/25/2017     [facility]             Current Facility-Administered Medications               Current Outpatient Prescriptions   Medication Sig Dispense Refill    Multiple Vitamin (MVI, BARIATRIC ADVANTAGE MULTI-FORMULA, CHEW TAB) Take 1 tablet by mouth 2 times daily        Cyanocobalamin (VITAMIN B-12 SL) Place 1 tablet under the tongue daily        Calcium Citrate-Vitamin D (CALCIUM + VIT D, BARIATRIC ADVANTAGE, CHEWABLE TABLET) Take 1 tablet by mouth 3 times daily        omeprazole (PRILOSEC) 20 MG delayed release capsule Take 20 mg by mouth daily        citalopram (CELEXA) 20 MG tablet Take 20 mg by mouth daily.          hydrochlorothiazide (HYDRODIURIL) 25 MG tablet Take 25 mg by mouth daily. DO NOT TAKE AM OF SURGERY         therapeutic multivitamin-minerals (THERAGRAN-M) tablet Take 1 tablet by mouth daily.  STOP NOW FOR SURGERY           No current facility-administered medications for this visit.                     Allergies   Allergen Reactions    Percocet 11/08/2017     K 3.8 11/08/2017     CL 97 11/08/2017     CO2 27 11/08/2017     CALCIUM 9.4 11/08/2017     AST 23 08/30/2017     ALKPHOS 97 08/30/2017     PROT 7.3 08/30/2017     LABALBU 4.1 08/30/2017     BILITOT 0.5 08/30/2017     ALT 34 08/30/2017         PREALBUMIN             Lab Results   Component Value Date     PREALBUMIN 25.7 08/30/2017         VITAMIN B12             Lab Results   Component Value Date     XSDYNMTA83 325 08/30/2017         24 HOUR URINE CALCIUM   No results found for: Edyth Borders, CALCIUMUR      VITAMIN D             Lab Results   Component Value Date     VITD25 33 08/30/2017         VITAMIN B1/ THIAMINE             Lab Results   Component Value Date     PWPV3GIPGAE 122 08/30/2017         RBC FOLATE             Lab Results   Component Value Date     FOLATE > 20.0 08/30/2017         LIPID SCREEN (FASTING)             Lab Results   Component Value Date     CHOL 209 05/30/2017     TRIG 130 05/30/2017     HDL 54 05/30/2017     LDLCALC 129 05/30/2017   ,      HGA1C (T2DM ONLY)             Lab Results   Component Value Date     LABA1C 5.5 08/30/2017     AVGG 105 08/30/2017         TSH             Lab Results   Component Value Date     TSH 1.040 08/30/2017         IRON             Lab Results   Component Value Date     IRON 95 08/30/2017         IONIZED CALCIUM   No results found for: Charlie Mcclendon     Imaging -   Narrative   PROCEDURE: XR CHEST STANDARD TWO VW       CLINICAL INFORMATION: Morbid obesity with BMI of 60.0-69.9, adult ([address] Morbid obesity with BMI of 60.0-69.9, adult ([facility] [address] Hypertension, unspecified type       COMPARISON: No prior study.       TECHNIQUE: PA and lateral views of the chest were obtained.               Impression   1. Normal heart size. Tiny fibrotic or atelectatic plaque in the lingula.    2. Otherwise normal chest. No acute infiltrates or effusions are seen.          EGD and Cscope:  DATE OF PROCEDURE:  10/25/2017     PREOPERATIVE DIAGNOSES:  1.  Need for line  was then tested under irrigation while it was being clamped distally and  insufflation through the bougie was demonstrated, no air leak or any kind  of a bubble. Irrigation was removed. Excess air was also removed with the  bougie and then the bougie was taken off of suction and then removed. Staple line was then reinforced with Evicel to ensure hemostasis and also  to give some added support. 0 Ethibond was then placed through-and-through  the gastric remnant, brought up to 15 mm trocar site. Instruments were  then removed. Robot was undocked. I then scrubbed back on to the table. Gastric remnant was removed and sent to pathology for permanent. Using a  Sgnam suture passer 0 Vicryl suture was placed through-and-through the  fascia x2. At the completion of this, there were no fascial defect. Other  trocars were removed. Hemostasis appeared to be adequate. The patient  tolerated desufflation well. Vicryl suture 4-0 in a subcuticular fashion  was carried out to close the skin and each incisional sites. Closed  incisions were then cleaned, dried, and Steri-strips applied. Marcaine  0.5% with epinephrine was used for local anesthetic. The patient tolerated  the injection of anesthetic without difficulty. Sponge, needle, and  instrumentation count was correct at the end of the procedure. The patient  tolerated the procedure well with no apparent complications and only about  10 to 20 mL of blood loss. She was able to be brought out of general  anesthesia and then transferred to the postanesthesia care unit in stable  condition.     PROCEDURE: FL UGI       CLINICAL INFORMATION: Obesity, status post sleeve gastrectomy       TECHNIQUE: A preliminary abdominal radiograph was obtained. Following the oral administration of dilute Gastrografin and thin barium, the anatomy of the distal esophagus, stomach and duodenum were evaluated in a limited upper GI examination.  A total of 4    radiographs were 11/21/2017      PATHOLOGY REPORT    Clinical Information: OBESITY    FINAL DIAGNOSIS:  Partial gastrectomy, resection:   Gross description only. Specimen:  STOMACH REMNANT      Gross Examination:  The container is labeled Narendra Borges, stomach remnant. Received fresh is a partial gastrectomy specimen measuring 16 x 4 x 3  cm.  The serosal surface is pink.  The specimen is opened revealing a  red-tan mucosal surface with normal rugal folds.  There are no masses  or polyps.  The specimen is for gross description only. Discharge Instructions:  Pt Name: Narendra Borges  Medical Record Number: [id number]  Today's Date: 11/21/2017    GENERAL ANESTHESIA OR SEDATION  1. Do not drive or operate hazardous machinery for 24 hours. 2. Do not make important business or personal decisions for 24 hours. 3. Do not drink alcoholic beverages or use tobacco for 24 hours. ACTIVITY INSTRUCTIONS:  [] Rest today. Resume light to normal activity tomorrow.   [] You may resume normal activity tomorrow. Do not engage in strenuous activity that may place stress on your incision. [x] Do not drive for 3-5 days or while taking pain medication. Avoid heavy lifting, tugging, pullings greater than 10 lbs until seen in the office. DIET INSTRUCTIONS:  [x]Other: Sugar-free post bariatric surgery diet as instructed per dietician. Continue protein shakes as prior to surgery. Two shakes a day with milk or three shakes a day with water. MEDICATIONS  [x]Prescription sent with you to be used as directed. []Percocet   []Vicodin   [x]oxycodone    []Tylenol #3   []Oxycontin  Do not drink alcohol or drive while taking these medications. You may experience dizziness or drowsiness with these medications. You may also experience constipation which can be relieved with stool softners or laxatives. [x]You may resume your daily prescription medication schedule unless otherwise specified.   Continue normal prescription for omeprazole normal.)   Increased or progressive drainage from the surgical area   Inability to urinate or blood in the urine   Pain not relieved by the medications ordered   Persistent nausea and/or vomiting, unable to retain fluids. Pain or swelling in your legs. Shortness of breath. Call the office if you develop any of the above symptoms. FOLLOW-UP APPOINTMENT   [x]1 week: in weight management   []2 weeks:    []Other    Call my office if you have any problem that concerns you [phone]. After hours, you can reach the answering service via the office phone number. IF YOU NEED IMMEDIATE ATTENTION, GO TO THE EMERGENCY ROOM AND YOUR DOCTOR WILL BE CONTACTED. Prepared by Aidan Esteban CNP for  Filiberto Stallings MD  [address]  [place] Coll[place], [facility]  Electronically signed 11/21/2017 at 11:52 AM    Discharge Medications:        Medication List      START taking these medications    Docusate Sodium 100 MG Tabs  Take 100 mg by mouth 2 times daily Take as needed to prevent constipation     enoxaparin 40 MG/0.4ML injection  Commonly known as:  LOVENOX  Inject 0.4 mLs into the skin daily for 14 days     hyoscyamine 125 MCG Tbdp dispersible tablet  Commonly known as:  OSCIMIN  Take 1 tablet by mouth every 4 hours as needed (epigastric spasms/cramping)     ketorolac 10 MG tablet  Commonly known as:  TORADOL  Take 1 tablet by mouth every 6 hours as needed for Pain     metoclopramide 10 MG tablet  Commonly known as:  REGLAN  Take 1 tablet by mouth every 6 hours as needed (nausea/vomiting)     ondansetron 4 MG tablet  Commonly known as:  ZOFRAN  Take 1 tablet by mouth every 4 hours as needed for Nausea or Vomiting     oxyCODONE 5 MG immediate release tablet  Commonly known as:  ROXICODONE  Take 1 tablet by mouth every 6 hours as needed for Pain .         CONTINUE taking these medications    CALCIUM + VIT D (BARIATRIC ADVANTAGE) CHEWABLE TABLET     citalopram 20 MG tablet  Commonly known as:  CELEXA     MVI (BARIATRIC

## 2017-11-27 ENCOUNTER — HOSPITAL ENCOUNTER (OUTPATIENT)
Age: 52
Discharge: HOME | End: 2017-11-27
Payer: COMMERCIAL

## 2017-11-27 DIAGNOSIS — Z98.84 S/P LAPAROSCOPIC SLEEVE GASTRECTOMY: ICD-10-CM

## 2017-11-27 DIAGNOSIS — E87.6 HYPOKALEMIA: ICD-10-CM

## 2017-11-27 LAB — POTASSIUM SERPL-SCNC: 3.9 MEQ/L (ref 3.5–5.2)

## 2017-11-27 PROCEDURE — 36415 COLL VENOUS BLD VENIPUNCTURE: CPT

## 2017-11-27 PROCEDURE — 84132 ASSAY OF SERUM POTASSIUM: CPT

## 2017-11-28 ENCOUNTER — OFFICE VISIT (OUTPATIENT)
Dept: BARIATRICS/WEIGHT MGMT | Age: 52
End: 2017-11-28

## 2017-11-28 VITALS
SYSTOLIC BLOOD PRESSURE: 120 MMHG | WEIGHT: 293 LBS | HEIGHT: 64 IN | BODY MASS INDEX: 50.02 KG/M2 | DIASTOLIC BLOOD PRESSURE: 78 MMHG | TEMPERATURE: 97.8 F | HEART RATE: 76 BPM | RESPIRATION RATE: 18 BRPM

## 2017-11-28 DIAGNOSIS — E66.01 MORBID OBESITY WITH BMI OF 50.0-59.9, ADULT (HCC): Primary | ICD-10-CM

## 2017-11-28 DIAGNOSIS — Z98.84 S/P LAPAROSCOPIC SLEEVE GASTRECTOMY: ICD-10-CM

## 2017-11-28 PROCEDURE — 99024 POSTOP FOLLOW-UP VISIT: CPT | Performed by: PHYSICIAN ASSISTANT

## 2017-11-28 NOTE — PROGRESS NOTES
10/25/2017    EGD BIOPSY performed by Maria M Taylor MD at [address] [place] [address] N/A 11/20/2017    ROBOTIC SLEEVE GASTRECTOMY performed by Joselin Max MD at [facility]       Past Social History:  Social History     Social History    Marital status:      Spouse name: N/A    Number of children: N/A    Years of education: N/A     Occupational History    Not on file. Social History Main Topics    Smoking status: Never Smoker    Smokeless tobacco: Never Used    Alcohol use No    Drug use: No    Sexual activity: Not on file     Other Topics Concern    Not on file     Social History Narrative    No narrative on file        Medications:   Current Outpatient Prescriptions   Medication Sig Dispense Refill    ketorolac (TORADOL) 10 MG tablet Take 1 tablet by mouth every 6 hours as needed for Pain 20 tablet 0    enoxaparin (LOVENOX) 40 MG/0.4ML injection Inject 0.4 mLs into the skin daily for 14 days 14 Syringe 0    metoclopramide (REGLAN) 10 MG tablet Take 1 tablet by mouth every 6 hours as needed (nausea/vomiting) 30 tablet 0    ondansetron (ZOFRAN) 4 MG tablet Take 1 tablet by mouth every 4 hours as needed for Nausea or Vomiting 30 tablet 0    Multiple Vitamin (MVI, BARIATRIC ADVANTAGE MULTI-FORMULA, CHEW TAB) Take 1 tablet by mouth 2 times daily      Cyanocobalamin (VITAMIN B-12 SL) Place 1 tablet under the tongue daily      Calcium Citrate-Vitamin D (CALCIUM + VIT D, BARIATRIC ADVANTAGE, CHEWABLE TABLET) Take 1 tablet by mouth 3 times daily      omeprazole (PRILOSEC) 20 MG delayed release capsule Take 20 mg by mouth daily      citalopram (CELEXA) 20 MG tablet Take 20 mg by mouth daily.  oxyCODONE (ROXICODONE) 5 MG immediate release tablet Take 1 tablet by mouth every 6 hours as needed for Pain .  30 tablet 0    hyoscyamine (OSCIMIN) 125 MCG TBDP dispersible tablet Take 1 tablet by mouth every 4 hours as needed (epigastric spasms/cramping) 40 tablet 0    Docusate Sodium 100 MG TABS Take 100 mg by mouth 2 times daily Take as needed to prevent constipation 30 tablet 1     No current facility-administered medications for this visit. Allergies:   No Known Allergies    Subjective:    Constitutional: Denies any fever, chills, fatigue. Wound: Denies any rash, skin color changes or wound problems. Resp: Denies any cough, shortness of breath. CV: Denies any chest pain, orthopnea or syncope. MS: Denies myalgias, arthralgias. GI: Denies any nausea, vomiting, diarrhea, constipation, melena, hematochezia.  minimal incisional discomfort. : Denies any hematuria, hesitancy or dysuria. NEURO: Denies seizures, headache. Objective:    /78 (Site: Right Arm, Position: Sitting, Cuff Size: Large Adult)   Pulse 76   Temp 97.8 °F (36.6 °C) (Oral)   Resp 18   Ht 5' 4\" (1.626 m)   Wt (!) 349 lb (158.3 kg)   BMI 59.91 kg/m²     Physical Examination:   Constitutional: Alert and oriented to person, place and time. Well-developed, well- nourished. Head: Normocephalic and atraumatic  Neck: Supple. Eyes: EOMI b/l. Conjunctivae normal.  No scleral icterus. Respiratory: Effort normal. No respiratory distress. Abd: Incisions are mildly tender without signs of infection. Several steri-strips remain intact. Ext:  Movement x 4. No edema  Skin; Warm and dry, no visible rashes, lesions or ulcers.    Neuro: Cranial Nerves Grossly Intact; nml coordination    Labs:  CBC   Lab Results   Component Value Date    WBC 5.9 11/08/2017    RBC 4.95 11/08/2017    RBC 4.82 11/17/2011    HGB 12.5 11/21/2017    HCT 37.4 11/21/2017    MCV 84.7 11/08/2017    MCH 28.9 11/08/2017    MCHC 34.2 11/08/2017    RDW 14.1 11/08/2017     11/08/2017    MPV 9.4 11/08/2017    RBCMORP NORMAL 08/30/2017    SEGSPCT 68.9 11/08/2017    LABLYMP 20.4 11/08/2017    LABLYMP 19.4 11/17/2011    MONOPCT 7.4 11/08/2017    LABEOS 2.6 11/08/2017    BASO 0.7 11/08/2017    NRBC 0 11/08/2017    NRBC 0

## 2017-12-04 ENCOUNTER — OFFICE VISIT (OUTPATIENT)
Dept: BARIATRICS/WEIGHT MGMT | Age: 52
End: 2017-12-04

## 2017-12-04 VITALS
SYSTOLIC BLOOD PRESSURE: 122 MMHG | HEART RATE: 72 BPM | TEMPERATURE: 97.6 F | DIASTOLIC BLOOD PRESSURE: 86 MMHG | WEIGHT: 293 LBS | HEIGHT: 64 IN | BODY MASS INDEX: 50.02 KG/M2 | RESPIRATION RATE: 18 BRPM

## 2017-12-04 DIAGNOSIS — Z98.84 S/P LAPAROSCOPIC SLEEVE GASTRECTOMY: ICD-10-CM

## 2017-12-04 DIAGNOSIS — Z13.21 MALNUTRITION SCREEN: ICD-10-CM

## 2017-12-04 DIAGNOSIS — E66.01 OBESITY, MORBID, BMI 50 OR HIGHER (HCC): Primary | ICD-10-CM

## 2017-12-04 DIAGNOSIS — K91.2 POSTOPERATIVE INTESTINAL MALABSORPTION: ICD-10-CM

## 2017-12-04 PROCEDURE — 99024 POSTOP FOLLOW-UP VISIT: CPT | Performed by: PHYSICIAN ASSISTANT

## 2017-12-04 RX ORDER — ACETAMINOPHEN 325 MG/1
325 TABLET ORAL EVERY 6 HOURS PRN
COMMUNITY

## 2017-12-04 NOTE — PATIENT INSTRUCTIONS
1. Begin to set aside three meal times per day about 1/4 cup portion size. Important to have set meal times so can still focus on adequate fluid intake between meals and get your protein in.  2.  Now is when you want to separate your liquids from solids. No liquids 30 minutes before your meals and no liquids 30 minutes after your meals. Water goal at least 64 oz per day  3. Continue to drink protein shakes between meals as can not get enough protein from food alone at this time. Goal is 60-80 grams protein per day. 4.  Continue taking bariatric vitamins as currently taking. Get  your six week post op lab work completed 5-7 days prior to next office visit.

## 2017-12-04 NOTE — PROGRESS NOTES
[facility]  [facility]  [address] , [address]  [address] [address]  Dept: [phone]  Loc: [phone]      Visit Date:  12/4/2017  Weight Management Postop Follow-up    HPI:      Virgel Blizzard is a 46 y.o. female who is here today for 2 weeks follow up since  laparoscopic sleeve gastrectomy performed by Dr. Cletis Epley  on 11/20/17. Down  11# since last visit. Down 8# since surgery. BMI 58 . Drinking 64 oz of fluid and over 60 grams of protein daily. Tolerating post-op diet. No problems with bowel movements. No nausea. No emesis. No GERD/ Reflux-continues to take PPI. Denies CP/SOB. No Dizziness. No abdominal pain. No incisional discomfort. No sx of dehydration. No fever or chills. Taking Lovenox, as rx. Off pain and nasuea meds. Taking and tolerating all vitamins. No questions or concerns today. Physical Activity:  walking  Days per week: 7  Time per session: 30 minutes    Current BMI: Body mass index is 58.09 kg/m².   Current Weight:   Wt Readings from Last 3 Encounters:   12/04/17 (!) 338 lb 6.4 oz (153.5 kg)   11/28/17 (!) 349 lb (158.3 kg)   11/20/17 (!) 344 lb (156 kg)     Pre-op Body Weight: 346      Past Medical History:  Past Medical History:   Diagnosis Date    GERD (gastroesophageal reflux disease)     Hypertension     Infertility, female     Nausea & vomiting     PONV (postoperative nausea and vomiting)        Past Surgical History:  Past Surgical History:   Procedure Laterality Date    ABDOMEN SURGERY  2003    RT OOPHERECTOMY    COLONOSCOPY      DILATATION, ESOPHAGUS      ENDOMETRIAL ABLATION      LEG DEBRIDEMENT  2011    RT     DC COLON CA SCRN NOT  [address] IND Left 10/25/2017    COLONOSCOPY performed by Kesha Nation MD at [facility]    DC EGD TRANSORAL BIOPSY SINGLE/MULTIPLE Left 10/25/2017    EGD BIOPSY performed by Kesha Nation MD at [place] [address] 11/20/2017    ROBOTIC SLEEVE GASTRECTOMY performed by Nery Narayanan MD at [facility]       Past Social History:  Social History     Social History    Marital status:      Spouse name: N/A    Number of children: N/A    Years of education: N/A     Occupational History    Not on file. Social History Main Topics    Smoking status: Never Smoker    Smokeless tobacco: Never Used    Alcohol use No    Drug use: No    Sexual activity: Not on file     Other Topics Concern    Not on file     Social History Narrative    No narrative on file        Medications:   Current Outpatient Prescriptions   Medication Sig Dispense Refill    acetaminophen (TYLENOL) 325 MG tablet Take 325 mg by mouth every 6 hours as needed for Pain      ketorolac (TORADOL) 10 MG tablet Take 1 tablet by mouth every 6 hours as needed for Pain 20 tablet 0    enoxaparin (LOVENOX) 40 MG/0.4ML injection Inject 0.4 mLs into the skin daily for 14 days 14 Syringe 0    Multiple Vitamin (MVI, BARIATRIC ADVANTAGE MULTI-FORMULA, CHEW TAB) Take 1 tablet by mouth 2 times daily      Cyanocobalamin (VITAMIN B-12 SL) Place 1 tablet under the tongue daily      Calcium Citrate-Vitamin D (CALCIUM + VIT D, BARIATRIC ADVANTAGE, CHEWABLE TABLET) Take 1 tablet by mouth 3 times daily      omeprazole (PRILOSEC) 20 MG delayed release capsule Take 20 mg by mouth daily      citalopram (CELEXA) 20 MG tablet Take 20 mg by mouth daily. No current facility-administered medications for this visit. Allergies:   No Known Allergies    Subjective:    Constitutional: Denies any fever, chills, fatigue. Wound: Denies any rash, skin color changes or wound problems. Resp: Denies any cough, shortness of breath. CV: Denies any chest pain, orthopnea or syncope. MS: Denies myalgias, arthralgias. GI: Denies any nausea, vomiting, diarrhea, constipation, melena, hematochezia. Minimal incisional discomfort. : Denies any hematuria, hesitancy or dysuria.    NEURO: Denies seizures, headache. Objective:    /86 (Site: Left Arm, Position: Sitting, Cuff Size: Large Adult)   Pulse 72   Temp 97.6 °F (36.4 °C) (Oral)   Resp 18   Ht 5' 4\" (1.626 m)   Wt (!) 338 lb 6.4 oz (153.5 kg)   BMI 58.09 kg/m²     Physical Examination:   Constitutional: Alert and oriented to person, place and time. Well-developed, well- nourished. Head: Normocephalic and atraumatic  Neck: Supple. Eyes: EOMI b/l. Conjunctivae normal.  No scleral icterus. Respiratory: Effort normal. No respiratory distress. Abd: Incisions are healing nicely. Incision to left of umbilicus is opened slightly. No active drainage or sign of infection. Ext:  Movement x 4. No edema  Skin; Warm and dry, no visible rashes, lesions or ulcers.    Neuro: Cranial Nerves Grossly Intact; nml coordination    Labs:  CBC   Lab Results   Component Value Date    WBC 5.9 11/08/2017    RBC 4.95 11/08/2017    RBC 4.82 11/17/2011    HGB 12.5 11/21/2017    HCT 37.4 11/21/2017    MCV 84.7 11/08/2017    MCH 28.9 11/08/2017    MCHC 34.2 11/08/2017    RDW 14.1 11/08/2017     11/08/2017    MPV 9.4 11/08/2017    RBCMORP NORMAL 08/30/2017    SEGSPCT 68.9 11/08/2017    LABLYMP 20.4 11/08/2017    LABLYMP 19.4 11/17/2011    MONOPCT 7.4 11/08/2017    LABEOS 2.6 11/08/2017    BASO 0.7 11/08/2017    NRBC 0 11/08/2017    NRBC 0 11/17/2011    SEGSABS 4.1 11/08/2017    LYMPHSABS 1.2 11/08/2017    MONOSABS 0.4 11/08/2017    EOSABS 0.2 11/08/2017    BASOSABS 0.0 11/08/2017        BMP/CMP   Lab Results   Component Value Date    GLUCOSE 104 11/21/2017    CREATININE 0.8 11/21/2017    BUN 25 11/21/2017     11/21/2017    K 3.9 11/27/2017     11/21/2017    CO2 27 11/21/2017    CALCIUM 8.5 11/21/2017    AST 23 08/30/2017    ALKPHOS 97 08/30/2017    PROT 7.3 08/30/2017    LABALBU 4.1 08/30/2017    BILITOT 0.5 08/30/2017    ALT 34 08/30/2017        PREALBUMIN   Lab Results   Component Value Date    PREALBUMIN 25.7 08/30/2017        VITAMIN B12   Lab Results

## 2017-12-15 ENCOUNTER — TELEPHONE (OUTPATIENT)
Dept: BARIATRICS/WEIGHT MGMT | Age: 52
End: 2017-12-15

## 2017-12-15 DIAGNOSIS — Z98.84 S/P LAPAROSCOPIC SLEEVE GASTRECTOMY: ICD-10-CM

## 2017-12-15 DIAGNOSIS — B99.9 INFECTION: Primary | ICD-10-CM

## 2017-12-15 DIAGNOSIS — L53.9 REDNESS: ICD-10-CM

## 2017-12-15 RX ORDER — SULFAMETHOXAZOLE AND TRIMETHOPRIM 800; 160 MG/1; MG/1
1 TABLET ORAL 2 TIMES DAILY
Qty: 14 TABLET | Refills: 0 | Status: CANCELLED | OUTPATIENT
Start: 2017-12-15 | End: 2017-12-22

## 2017-12-15 NOTE — TELEPHONE ENCOUNTER
Patient called office this morning stating still has some seeping at top left side incision that is red and slightly tender at 3 1/2 weeks post op. Pt also reports last nite started with a hard, bumpy and red area on inner left thigh. Reviewed above with Dr Soham Muniz and recommendation given to start Bactrim DS BID for seven days for incision. Also order given for bilateral lower extremity ultrasound. Pt unable to have ultrasound scheduled until Monday at [facility]. Left message for patient if the size of hard lump becomes larger, increased pain and redness then needs to go to ER. Pt can call office if additional questions.

## 2017-12-18 ENCOUNTER — HOSPITAL ENCOUNTER (OUTPATIENT)
Dept: INTERVENTIONAL RADIOLOGY/VASCULAR | Age: 52
Discharge: HOME | End: 2017-12-18
Payer: COMMERCIAL

## 2017-12-18 DIAGNOSIS — Z98.84 S/P LAPAROSCOPIC SLEEVE GASTRECTOMY: ICD-10-CM

## 2017-12-18 DIAGNOSIS — L53.9 REDNESS: ICD-10-CM

## 2017-12-18 DIAGNOSIS — B99.9 INFECTION: Primary | ICD-10-CM

## 2017-12-18 PROCEDURE — 93970 EXTREMITY STUDY: CPT

## 2017-12-19 ENCOUNTER — TELEPHONE (OUTPATIENT)
Dept: SURGERY | Age: 52
End: 2017-12-19

## 2017-12-19 RX ORDER — SULFAMETHOXAZOLE AND TRIMETHOPRIM 800; 160 MG/1; MG/1
1 TABLET ORAL 2 TIMES DAILY
Qty: 14 TABLET | Refills: 0 | OUTPATIENT
Start: 2017-12-19 | End: 2017-12-29

## 2017-12-21 ENCOUNTER — HOSPITAL ENCOUNTER (OUTPATIENT)
Age: 52
Discharge: HOME | End: 2017-12-21
Payer: COMMERCIAL

## 2017-12-21 DIAGNOSIS — Z13.21 MALNUTRITION SCREEN: ICD-10-CM

## 2017-12-21 DIAGNOSIS — K91.2 POSTOPERATIVE INTESTINAL MALABSORPTION: ICD-10-CM

## 2017-12-21 DIAGNOSIS — Z98.84 S/P LAPAROSCOPIC SLEEVE GASTRECTOMY: ICD-10-CM

## 2017-12-21 DIAGNOSIS — E66.01 OBESITY, MORBID, BMI 50 OR HIGHER (HCC): ICD-10-CM

## 2017-12-21 LAB
ALBUMIN SERPL-MCNC: 4 G/DL (ref 3.5–5.1)
ALP BLD-CCNC: 85 U/L (ref 38–126)
ALT SERPL-CCNC: 28 U/L (ref 11–66)
ANION GAP SERPL CALCULATED.3IONS-SCNC: 13 MEQ/L (ref 8–16)
ANISOCYTOSIS: ABNORMAL
AST SERPL-CCNC: 22 U/L (ref 5–40)
BASOPHILS # BLD: 1.2 %
BASOPHILS ABSOLUTE: 0.1 THOU/MM3 (ref 0–0.1)
BILIRUB SERPL-MCNC: 0.4 MG/DL (ref 0.3–1.2)
BUN BLDV-MCNC: 16 MG/DL (ref 7–22)
CALCIUM SERPL-MCNC: 9.4 MG/DL (ref 8.5–10.5)
CHLORIDE BLD-SCNC: 103 MEQ/L (ref 98–111)
CO2: 26 MEQ/L (ref 23–33)
CREAT SERPL-MCNC: 0.8 MG/DL (ref 0.4–1.2)
EOSINOPHIL # BLD: 5 %
EOSINOPHILS ABSOLUTE: 0.2 THOU/MM3 (ref 0–0.4)
GFR SERPL CREATININE-BSD FRML MDRD: 75 ML/MIN/1.73M2
GLUCOSE BLD-MCNC: 94 MG/DL (ref 70–108)
HCT VFR BLD CALC: 41.8 % (ref 37–47)
HEMOGLOBIN: 13.9 GM/DL (ref 12–16)
LYMPHOCYTES # BLD: 25.7 %
LYMPHOCYTES ABSOLUTE: 1.2 THOU/MM3 (ref 1–4.8)
MCH RBC QN AUTO: 27.7 PG (ref 27–31)
MCHC RBC AUTO-ENTMCNC: 33.1 GM/DL (ref 33–37)
MCV RBC AUTO: 83.7 FL (ref 81–99)
MONOCYTES # BLD: 9 %
MONOCYTES ABSOLUTE: 0.4 THOU/MM3 (ref 0.4–1.3)
NUCLEATED RED BLOOD CELLS: 0 /100 WBC
PDW BLD-RTO: 14.8 % (ref 11.5–14.5)
PLATELET # BLD: 254 THOU/MM3 (ref 130–400)
PMV BLD AUTO: 10.6 MCM (ref 7.4–10.4)
POTASSIUM SERPL-SCNC: 4.5 MEQ/L (ref 3.5–5.2)
PREALBUMIN: 15.4 MG/DL (ref 20–40)
RBC # BLD: 5 MILL/MM3 (ref 4.2–5.4)
SEG NEUTROPHILS: 59.1 %
SEGMENTED NEUTROPHILS ABSOLUTE COUNT: 2.7 THOU/MM3 (ref 1.8–7.7)
SODIUM BLD-SCNC: 142 MEQ/L (ref 135–145)
TOTAL PROTEIN: 7 G/DL (ref 6.1–8)
WBC # BLD: 4.6 THOU/MM3 (ref 4.8–10.8)

## 2017-12-21 PROCEDURE — 36415 COLL VENOUS BLD VENIPUNCTURE: CPT

## 2017-12-21 PROCEDURE — 80053 COMPREHEN METABOLIC PANEL: CPT

## 2017-12-21 PROCEDURE — 84134 ASSAY OF PREALBUMIN: CPT

## 2017-12-21 PROCEDURE — 85025 COMPLETE CBC W/AUTO DIFF WBC: CPT

## 2018-01-02 ENCOUNTER — OFFICE VISIT (OUTPATIENT)
Dept: BARIATRICS/WEIGHT MGMT | Age: 53
End: 2018-01-02

## 2018-01-02 VITALS
HEIGHT: 64 IN | RESPIRATION RATE: 18 BRPM | TEMPERATURE: 97.6 F | WEIGHT: 293 LBS | SYSTOLIC BLOOD PRESSURE: 132 MMHG | HEART RATE: 76 BPM | BODY MASS INDEX: 50.02 KG/M2 | DIASTOLIC BLOOD PRESSURE: 84 MMHG

## 2018-01-02 DIAGNOSIS — K91.2 POSTOPERATIVE INTESTINAL MALABSORPTION: ICD-10-CM

## 2018-01-02 DIAGNOSIS — Z13.21 MALNUTRITION SCREEN: ICD-10-CM

## 2018-01-02 DIAGNOSIS — E66.01 MORBID OBESITY WITH BMI OF 50.0-59.9, ADULT (HCC): Primary | ICD-10-CM

## 2018-01-02 DIAGNOSIS — Z98.84 S/P LAPAROSCOPIC SLEEVE GASTRECTOMY: ICD-10-CM

## 2018-01-02 DIAGNOSIS — Z98.84 S/P LAPAROSCOPIC SLEEVE GASTRECTOMY: Primary | ICD-10-CM

## 2018-01-02 PROCEDURE — 99024 POSTOP FOLLOW-UP VISIT: CPT | Performed by: PHYSICIAN ASSISTANT

## 2018-01-02 NOTE — PATIENT INSTRUCTIONS
1. Continue bariatric vitamins as you are currently taking. 2. Goal remains  60-80 grams protein per day. Focus on choosing protein foods first at meals. Suggest one cup of 1% or skim milk twice a day or continue one protein shake daily to meet this recommendation. 3. Increase physical activity to include cardiac and strength training now that you no longer have weight restrictions  4. Water goal is 64 oz per day and make sure no liquids 30 minutes before meals and no liquids 30 minutes after meals  5. Take 20-30 minutes to eat meals and chew all foods well for best tolerance especially as you introduce new foods. Arrive 10 minutes early next visit for SECA scale measurement and get lab work done 5-7 days before next office visit.

## 2018-01-02 NOTE — PROGRESS NOTES
Temp 97.6 °F (36.4 °C) (Oral)   Resp 18   Ht 5' 4\" (1.626 m)   Wt (!) 322 lb 9.6 oz (146.3 kg)   BMI 55.37 kg/m²     Physical Examination:   Constitutional: Alert and oriented to person, place and time. Well-developed, well- nourished. Head: Normocephalic and atraumatic  Neck: Supple. Eyes: EOMI b/l. Conjunctivae normal.  No scleral icterus. Respiratory: Effort normal. No respiratory distress. Abd: Incision to left of umbilicus with scabbing. No sign of infection. Minimal tenderness. Ext:  Movement x 4. No edema  Skin; Warm and dry, no visible rashes, lesions or ulcers.    Neuro: Cranial Nerves Grossly Intact; nml coordination      Labs:  CBC   Lab Results   Component Value Date    WBC 4.6 12/21/2017    RBC 5.00 12/21/2017    RBC 4.82 11/17/2011    HGB 13.9 12/21/2017    HCT 41.8 12/21/2017    MCV 83.7 12/21/2017    MCH 27.7 12/21/2017    MCHC 33.1 12/21/2017    RDW 14.8 12/21/2017     12/21/2017    MPV 10.6 12/21/2017    RBCMORP NORMAL 08/30/2017    SEGSPCT 59.1 12/21/2017    LABLYMP 25.7 12/21/2017    LABLYMP 19.4 11/17/2011    MONOPCT 9.0 12/21/2017    LABEOS 5.0 12/21/2017    BASO 1.2 12/21/2017    NRBC 0 12/21/2017    NRBC 0 11/17/2011    ANISOCYTOSIS 1+ 12/21/2017    SEGSABS 2.7 12/21/2017    LYMPHSABS 1.2 12/21/2017    MONOSABS 0.4 12/21/2017    EOSABS 0.2 12/21/2017    BASOSABS 0.1 12/21/2017        BMP/CMP   Lab Results   Component Value Date    GLUCOSE 94 12/21/2017    CREATININE 0.8 12/21/2017    BUN 16 12/21/2017     12/21/2017    K 4.5 12/21/2017     12/21/2017    CO2 26 12/21/2017    CALCIUM 9.4 12/21/2017    AST 22 12/21/2017    ALKPHOS 85 12/21/2017    PROT 7.0 12/21/2017    LABALBU 4.0 12/21/2017    BILITOT 0.4 12/21/2017    ALT 28 12/21/2017        PREALBUMIN   Lab Results   Component Value Date    PREALBUMIN 15.4 12/21/2017        VITAMIN B12   Lab Results   Component Value Date    KBZTIPHO03 325 08/30/2017        24 HOUR URINE CALCIUM   No results found for: Julito Jones, Brandon Uriarte     VITAMIN D   Lab Results   Component Value Date    VITD25 33 08/30/2017        VITAMIN B1/ THIAMINE   Lab Results   Component Value Date    [id number] 122 08/30/2017        RBC FOLATE   Lab Results   Component Value Date    FOLATE > 20.0 08/30/2017        LIPID SCREEN (FASTING)   Lab Results   Component Value Date    CHOL 209 05/30/2017    TRIG 130 05/30/2017    HDL 54 05/30/2017    LDLCALC 129 05/30/2017   ,     HGA1C (T2DM ONLY)   Lab Results   Component Value Date    LABA1C 5.5 08/30/2017    AVGG 105 08/30/2017        TSH   Lab Results   Component Value Date    TSH 1.040 08/30/2017        IRON   Lab Results   Component Value Date    IRON 95 08/30/2017        IONIZED CALCIUM   No results found for: XANDER NAVARRO      Assessment:      1. Morbid obesity with BMI of 50.0-59.9, adult (HCC)  CBC Auto Differential    Prealbumin    Comprehensive Metabolic Panel    Vitamin B1    Vitamin D 25 Hydroxy   2. S/P laparoscopic sleeve gastrectomy  CBC Auto Differential    Prealbumin    Comprehensive Metabolic Panel    Vitamin B1    Vitamin D 25 Hydroxy   3. Postoperative intestinal malabsorption  CBC Auto Differential    Prealbumin    Comprehensive Metabolic Panel    Vitamin B1    Vitamin D 25 Hydroxy   4. Malnutrition screen  CBC Auto Differential    Prealbumin    Comprehensive Metabolic Panel    Vitamin B1    Vitamin D 25 Hydroxy     Plan:    Stay well hydrated. Drink a minimum of 64 oz of non-carbonated, non-caffeinated fluids daily. Nutritional education occurred during visit. Tolerating diet. Continue following with dietitian and follow their recommendations as directed. Continue  60-80  grams of protein each day. Signs and symptoms reviewed with patient that would be concerning and need her to return to office for re-evaluation. Patient will call if any questions or concerns arrise. Off all restrictions  Importance of physical activity discussed with patient.    Increase physical activity as tolerated  Add strength training  Continue taking Multivitamin, Calcium and B12 as directed  Continue PPI for at least 3 months post-op. Can add Zantac QHS  prn  Encouraged to attend support groups  Progress diet as tolerated per dietitian recommendations. Advised to not weight self more than once weekly  6 week labs reviewed with patient today  12 week labs ordered- to be drawn one week prior to next apt. Follow up in 6 weeks with provider and dietitian    Return in about 6 weeks (around 2/13/2018) for postop follow up.        Electronically signed by CAROLINE Subramanian on 1/2/2018 at 11:07 AM

## 2018-01-09 DIAGNOSIS — G89.29 CHRONIC LOW BACK PAIN WITH RIGHT-SIDED SCIATICA, UNSPECIFIED BACK PAIN LATERALITY: Primary | ICD-10-CM

## 2018-01-09 DIAGNOSIS — M54.41 CHRONIC LOW BACK PAIN WITH RIGHT-SIDED SCIATICA, UNSPECIFIED BACK PAIN LATERALITY: Primary | ICD-10-CM

## 2018-01-24 DIAGNOSIS — M54.41 CHRONIC LOW BACK PAIN WITH RIGHT-SIDED SCIATICA, UNSPECIFIED BACK PAIN LATERALITY: Primary | ICD-10-CM

## 2018-01-24 DIAGNOSIS — G89.29 CHRONIC LOW BACK PAIN WITH RIGHT-SIDED SCIATICA, UNSPECIFIED BACK PAIN LATERALITY: Primary | ICD-10-CM

## 2018-01-25 ENCOUNTER — HOSPITAL ENCOUNTER (OUTPATIENT)
Dept: PHYSICAL THERAPY | Age: 53
Setting detail: THERAPIES SERIES
Discharge: HOME | End: 2018-01-25
Payer: COMMERCIAL

## 2018-01-25 PROCEDURE — 97110 THERAPEUTIC EXERCISES: CPT

## 2018-01-25 PROCEDURE — 97162 PT EVAL MOD COMPLEX 30 MIN: CPT

## 2018-01-25 ASSESSMENT — PAIN DESCRIPTION - FREQUENCY: FREQUENCY: INTERMITTENT

## 2018-01-25 ASSESSMENT — PAIN DESCRIPTION - ORIENTATION: ORIENTATION: RIGHT;LOWER

## 2018-01-25 ASSESSMENT — PAIN DESCRIPTION - LOCATION: LOCATION: BACK;BUTTOCKS;LEG;KNEE

## 2018-01-25 ASSESSMENT — PAIN SCALES - GENERAL: PAINLEVEL_OUTOF10: 4

## 2018-01-25 ASSESSMENT — PAIN DESCRIPTION - PROGRESSION: CLINICAL_PROGRESSION: NOT CHANGED

## 2018-01-25 ASSESSMENT — PAIN DESCRIPTION - PAIN TYPE: TYPE: CHRONIC PAIN

## 2018-01-25 NOTE — PROGRESS NOTES
I certify that I have examined the patient below and determined that Physical Medicine and Rehabilitation service is necessary; that the secondary diagnosis for the provision of rehabilitation services is consistent with identified needs; that service will be furnished on an outpatient basis while the patient is in my care; that I approve the above plan of care for up to 90 days or as specifically noted above and will review it within that time frame or more often if the patients condition requires. Attestation, signature or co-signature of physician indicates approval of certification requirements.    ________________________ ____________ __________  Physician Signature   Date   Time       [place]     Time In: 3684  Time Out: [address]  Minutes: 45  Timed Code Treatment Minutes: 23 Minutes     Date: 2018  Patient Name: Maddy Weiss,  Gender:  female        CSN: [id number]   : 1965  (46 y.o.)        Referring Practitioner: CAROLINE Sky      Diagnosis: M54.41, G89.29 (ICD-10-CM) - Chronic low back pain with right-sided sciatica, unspecified back pain laterality  Treatment Diagnosis: low back pain with RLE radiculopathy effecting standing and walking. Additional Pertinent Hx: comorbidities: gastric sleeve 17, HTN       General:  PT Visit Information  Onset Date: 18  PT Insurance Information: [facility] [place]  aquatic therapy covered, modalities covered, no visit limit, no precert  Total # of Visits to Date: 1  Plan of Care/Certification Expiration Date: 18  Progress Note Counter: 1/10 for PN                        See Medical History Questionnaire for information related to allergies and medications. Subjective:  Chart Reviewed: Yes  Family / Caregiver Present: No  Comments: Follow up with CAROLINE Sky 18.       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 Note right lumbar convexity, note right iliac crest and PSIS higher on right, Note normal lumbar lordotic curve. Spine: Note lumbar flexion fingertips to floor, lumbar extension WNLS,  lateral shifts left (shoulders to left, hips to right) moderate restriction, right lateral shift WNLS. Comment: RLE Strength WNLS 4+/5 hip, knee and ankle 5/5     Comment: LLE strength WNLS hip, knee adn ankle 5/5. Comment: Core strength: decreased abdominal strength with decreased stabilization with seated MMt and SLR with leg lowering maintaining lumbar neutral posture. Tone RLE  RLE Tone: Normotonic  Tone LLE  LLE Tone: Normotonic       Special Tests: Movement testing: standing flexion increase lumbosacral and right buttock and leg to knee symptoms, 4/10, Standing extension 1x no change,  5x reduced to 2/10,  5x more 0/10. supinelying with SKC and DKC aggravated back pain and leg symptoms 6/10,  pronelying 0/10 pain, after 1-3 minutes. Other: Leg Length descrepency: right LE longer than left  May be coming from pelvis instead of actual leg length decrepency. Will reassess. Overall Sensation Status: WNL           Rolling to Left: Independent  Rolling to Right: Independent  Supine to Sit: Independent  Sit to Supine: Independent                Transfers  Sit to Stand: Modified independent  Stand to sit: Modified independent            Ambulation 1  Surface: carpet  Device: No Device  Assistance: Independent  Quality of Gait: Note right iliac crest higher than left, Note left lateral shift of trunk.  decreased wt shift RLE. Distance: with in clinic                 Exercises  Exercise 1: Patient to use lumbar roll when sitting. Change position every 20 minutes. Exercise 2: Patient to interrupt positions and do standing back extension 5x in sets of 2 when RLE symptoms and back pain present. Exercise 3: Pronelying with one pillow under abdomen to unload to decrease stress on back. Exercise 4: Body Mechanics with logroll technique getting in and out of bed. Activity Tolerance:  Activity Tolerance: Patient Tolerated treatment well  Activity Tolerance: Able to abolish symptoms to 0/10 with back extension in standing and pronelying. Assessment: Body structures, Functions, Activity limitations: Decreased functional mobility , Decreased strength, Decreased ADL status  Assessment: 46year old females with right low back pain with radicular symptoms RLE. Patient demonstrates possible lumbar derangement symptoms with secondary SI dysfuntion. Patient responds well with extension program with symptoms abolished 0/10 following standing extension and posture correction with lumbar roll. Patient does demonstrate lateral shift of trunk and right iliac crest and PSIS higher than left. Will follow 2x per week for up to 6 weeks. Prognosis: Good  Discharge Recommendations: Continue to assess pending progress    Patient Education:  Patient Education: Patient educated in plan of care. She is to unload in stomachlying when able 3-5x per day. If unable then interrupt positions from sitting every 15-20 minutes and do standing extension sets of 5. Sit with lumbar roll. Plan:  Times per week: 2x  Plan weeks: 6 weeks  Specific instructions for Next Treatment: Modalities HP with interferential e stim, prone program and extension in standing, Reassess SI dysfunction. Core strengthening and lumbar stabilization program. body mechanics.    Current Treatment Recommendations: Strengthening, Functional Mobility Training, Manual Therapy - Soft Tissue Mobilization, Modalities, Home Exercise Program, Pain Management, ADL/Self-care Training    History: Personal factors or comorbidities that impact plan of care -  Moderate Complexity: 1-2 personal factors or comorbidities. See history section above for details. Examination: Body structures and functions, activity limitations, participation restrictions; using standardized tests and measures - Moderate Complexity: 3 or morebody structures and functional, activity limitations and/or participation restrictions. See restrictions and objective section above for details. Clinical Presentation: Moderate - Evolving with Changing Characteristics: Note intermittent radicular symptoms RLE that are produced with standing and walking and prolonged sitting. Patient symptoms are abolished with standing back extension and correcting sitting posture. Daily activities are effected due to back and leg symptoms. Decision Making: Moderate Complexity due to see above for explanations. Decision making was based on patient assessment and decision making process in determining plan of care and establishing reasonable expectations for measurable functional outcomes. Evaluation Complexity: Based on the findings of patient history, examination, clinical presentation, and decision making during this evaluation, the evaluation of Lana Jo  is of medium complexity. Goals:  Patient goals : Patient would like to be able to tolerate standing without back pain and leg symptoms. Be able to sit in car or chair more comfortably. Climb steps with more ease. Short term goals  Time Frame for Short term goals: 3 weeks  Short term goal 1: Oswestry from 12/50 @24% to no greater than 15%. Short term goal 2: Patient to report of decreased frequency of radicular symptoms RLE by 50% and centralization of symptoms to center low back lumbar level.    Short term goal

## 2018-01-29 ENCOUNTER — HOSPITAL ENCOUNTER (OUTPATIENT)
Dept: PHYSICAL THERAPY | Age: 53
Setting detail: THERAPIES SERIES
Discharge: HOME | End: 2018-01-29
Payer: COMMERCIAL

## 2018-01-29 PROCEDURE — 97110 THERAPEUTIC EXERCISES: CPT

## 2018-01-29 ASSESSMENT — PAIN DESCRIPTION - PAIN TYPE: TYPE: CHRONIC PAIN

## 2018-01-29 ASSESSMENT — PAIN SCALES - GENERAL: PAINLEVEL_OUTOF10: 4

## 2018-01-29 ASSESSMENT — PAIN DESCRIPTION - LOCATION: LOCATION: BACK;BUTTOCKS;LEG;KNEE

## 2018-02-01 ENCOUNTER — HOSPITAL ENCOUNTER (OUTPATIENT)
Dept: PHYSICAL THERAPY | Age: 53
Setting detail: THERAPIES SERIES
Discharge: HOME | End: 2018-02-01
Payer: COMMERCIAL

## 2018-02-01 PROCEDURE — 97110 THERAPEUTIC EXERCISES: CPT

## 2018-02-01 ASSESSMENT — PAIN DESCRIPTION - PAIN TYPE: TYPE: CHRONIC PAIN

## 2018-02-01 ASSESSMENT — PAIN DESCRIPTION - LOCATION: LOCATION: BACK

## 2018-02-01 ASSESSMENT — PAIN SCALES - GENERAL: PAINLEVEL_OUTOF10: 1

## 2018-02-01 NOTE — PROGRESS NOTES
back pain  Exercise 4: standing patient right lateral shift with hips toward wall and shoulders to right   x5  pain returned to knee so held on further lateral shifts this session. Exercise 5: standing back extension 2x5  0/10 back and knee symptoms  Exercise 6: abdominal bracing:  minimarches x15,  heelwalk or heelslide right x15, left x15,  supine leg lowering  x10 right and left. Exercise 7: abdominal bracing: UE/LE reciprocal x10   Exercise 8: following back stabilization ex: standing back extension 2x5 Pain level remained abolished 0/10          Activity Tolerance:  Activity Tolerance: Patient Tolerated treatment well  Activity Tolerance: discontinued lateral shift today due to improved correction of shift. Standing back extension was able to reduce and abolish back symptoms to 0/10. Assessment: Body structures, Functions, Activity limitations: Decreased functional mobility , Decreased strength, Decreased ADL status  Assessment: Able to abolish symptoms at back today with sagittal back extension in standing. Discontinued lateral shifts due to correction. Increased reps to 15 with back stabilization exercises. Discharge Recommendations: Continue to assess pending progress    Patient Education:  Patient Education: Discontinue lateral shift. Continue with standing back extension during day 5-7 x to abolish symptoms. Increase reps with stabilization ex. Plan:  Times per week: 2x  Plan weeks: 6 weeks  Specific instructions for Next Treatment: Extension responder . Progress back stabilizaiton exercises. supine and seated position as tolerance. standing position. Current Treatment Recommendations: Strengthening, Functional Mobility Training, Manual Therapy - Soft Tissue Mobilization, Modalities, Home Exercise Program, Pain Management, ADL/Self-care Training    Goals:  Patient goals : Patient would like to be able to tolerate standing without back pain and leg symptoms.  Be

## 2018-02-05 ENCOUNTER — HOSPITAL ENCOUNTER (OUTPATIENT)
Dept: PHYSICAL THERAPY | Age: 53
Setting detail: THERAPIES SERIES
Discharge: HOME | End: 2018-02-05
Payer: COMMERCIAL

## 2018-02-05 PROCEDURE — 97110 THERAPEUTIC EXERCISES: CPT

## 2018-02-05 ASSESSMENT — PAIN DESCRIPTION - FREQUENCY: FREQUENCY: INTERMITTENT

## 2018-02-05 ASSESSMENT — PAIN DESCRIPTION - PROGRESSION: CLINICAL_PROGRESSION: GRADUALLY IMPROVING

## 2018-02-05 ASSESSMENT — PAIN SCALES - GENERAL: PAINLEVEL_OUTOF10: 1

## 2018-02-05 ASSESSMENT — PAIN DESCRIPTION - PAIN TYPE: TYPE: CHRONIC PAIN

## 2018-02-05 ASSESSMENT — PAIN DESCRIPTION - LOCATION: LOCATION: BACK

## 2018-02-05 ASSESSMENT — PAIN DESCRIPTION - ORIENTATION: ORIENTATION: RIGHT

## 2018-02-05 NOTE — PROGRESS NOTES
x10 right and left. Exercise 7: abdominal bracing: UE/LE reciprocal x10, bx10 bridges x10,  hip abducton with blue theraband x10,  adduction with ball   Exercise 9: standing 3 way hip in parallel bars: ;abdominal brace with small ranges x10 each leg. Cues for posture and small ranges. Exercise 10: standing back extension 5x with endrange patient overpressure. 0/10 back and leg symptoms  Exercise 18: Assessment: Note increased forward flexion with knuckles to hands flat on floor. Extension WNLS,  0/10 pain with flexion. Activity Tolerance:  Activity Tolerance: Patient Tolerated treatment well  Activity Tolerance: Note symptoms abolished today with standing back extension with patient overpressure. Assessment: Body structures, Functions, Activity limitations: Decreased functional mobility , Decreased strength, Decreased ADL status  Assessment: Progressed stabilizaiton ex to standing 3 way hip, bridges, hip adduction with ball squeeze, hip abduction in hooklyign with blue theraband. Added back extension with patient overpressure. 0/10 symptoms at back and RLE Symptoms centralized. Prognosis: Good  Discharge Recommendations: Continue to assess pending progress    Patient Education:  Patient Education: Standing backe extension with patient overpressure. Additional back stabilization ex. to standing 3 way hip, hooklying with bridges, hip adducton and hip abduction in hooklying. Issued blue theraband                      Plan:  Times per week: 2x  Plan weeks: 6 weeks  Specific instructions for Next Treatment: Extension responder . Progress back stabilizaiton exercises. supine and seated position as tolerance. standing position.     Current Treatment Recommendations: Strengthening, Functional Mobility Training, Manual Therapy - Soft Tissue Mobilization, Modalities, Home Exercise Program, Pain Management, ADL/Self-care Training    Goals:  Patient goals : Patient would like to be able to tolerate

## 2018-02-08 ENCOUNTER — APPOINTMENT (OUTPATIENT)
Dept: PHYSICAL THERAPY | Age: 53
End: 2018-02-08
Payer: COMMERCIAL

## 2018-02-09 ENCOUNTER — HOSPITAL ENCOUNTER (OUTPATIENT)
Dept: PHYSICAL THERAPY | Age: 53
Setting detail: THERAPIES SERIES
Discharge: HOME | End: 2018-02-09
Payer: COMMERCIAL

## 2018-02-09 ENCOUNTER — HOSPITAL ENCOUNTER (OUTPATIENT)
Age: 53
Discharge: HOME | End: 2018-02-09
Payer: COMMERCIAL

## 2018-02-09 DIAGNOSIS — Z13.21 MALNUTRITION SCREEN: ICD-10-CM

## 2018-02-09 DIAGNOSIS — Z98.84 S/P LAPAROSCOPIC SLEEVE GASTRECTOMY: ICD-10-CM

## 2018-02-09 DIAGNOSIS — K91.2 POSTOPERATIVE INTESTINAL MALABSORPTION: ICD-10-CM

## 2018-02-09 DIAGNOSIS — E66.01 MORBID OBESITY WITH BMI OF 50.0-59.9, ADULT (HCC): ICD-10-CM

## 2018-02-09 LAB
ALBUMIN SERPL-MCNC: 4.1 G/DL (ref 3.5–5.1)
ALP BLD-CCNC: 86 U/L (ref 38–126)
ALT SERPL-CCNC: 13 U/L (ref 11–66)
ANION GAP SERPL CALCULATED.3IONS-SCNC: 13 MEQ/L (ref 8–16)
ANISOCYTOSIS: ABNORMAL
AST SERPL-CCNC: 15 U/L (ref 5–40)
BASOPHILS # BLD: 0.8 %
BASOPHILS ABSOLUTE: 0 THOU/MM3 (ref 0–0.1)
BILIRUB SERPL-MCNC: 0.5 MG/DL (ref 0.3–1.2)
BUN BLDV-MCNC: 18 MG/DL (ref 7–22)
CALCIUM SERPL-MCNC: 9.9 MG/DL (ref 8.5–10.5)
CHLORIDE BLD-SCNC: 102 MEQ/L (ref 98–111)
CO2: 27 MEQ/L (ref 23–33)
CREAT SERPL-MCNC: 0.7 MG/DL (ref 0.4–1.2)
EOSINOPHIL # BLD: 3 %
EOSINOPHILS ABSOLUTE: 0.2 THOU/MM3 (ref 0–0.4)
GFR SERPL CREATININE-BSD FRML MDRD: 88 ML/MIN/1.73M2
GLUCOSE BLD-MCNC: 90 MG/DL (ref 70–108)
HCT VFR BLD CALC: 41.6 % (ref 37–47)
HEMOGLOBIN: 14.1 GM/DL (ref 12–16)
LYMPHOCYTES # BLD: 22.6 %
LYMPHOCYTES ABSOLUTE: 1.4 THOU/MM3 (ref 1–4.8)
MCH RBC QN AUTO: 28.8 PG (ref 27–31)
MCHC RBC AUTO-ENTMCNC: 34 GM/DL (ref 33–37)
MCV RBC AUTO: 84.8 FL (ref 81–99)
MONOCYTES # BLD: 8.9 %
MONOCYTES ABSOLUTE: 0.5 THOU/MM3 (ref 0.4–1.3)
NUCLEATED RED BLOOD CELLS: 0 /100 WBC
PDW BLD-RTO: 15.3 % (ref 11.5–14.5)
PLATELET # BLD: 228 THOU/MM3 (ref 130–400)
PMV BLD AUTO: 10.6 FL (ref 7.4–10.4)
POTASSIUM SERPL-SCNC: 4.8 MEQ/L (ref 3.5–5.2)
PREALBUMIN: 13.8 MG/DL (ref 20–40)
RBC # BLD: 4.9 MILL/MM3 (ref 4.2–5.4)
SEG NEUTROPHILS: 64.7 %
SEGMENTED NEUTROPHILS ABSOLUTE COUNT: 3.9 THOU/MM3 (ref 1.8–7.7)
SODIUM BLD-SCNC: 142 MEQ/L (ref 135–145)
TOTAL PROTEIN: 7.2 G/DL (ref 6.1–8)
VITAMIN D 25-HYDROXY: 42 NG/ML (ref 30–100)
WBC # BLD: 6 THOU/MM3 (ref 4.8–10.8)

## 2018-02-09 PROCEDURE — 82306 VITAMIN D 25 HYDROXY: CPT

## 2018-02-09 PROCEDURE — 85025 COMPLETE CBC W/AUTO DIFF WBC: CPT

## 2018-02-09 PROCEDURE — 84425 ASSAY OF VITAMIN B-1: CPT

## 2018-02-09 PROCEDURE — 97110 THERAPEUTIC EXERCISES: CPT

## 2018-02-09 PROCEDURE — 80053 COMPREHEN METABOLIC PANEL: CPT

## 2018-02-09 PROCEDURE — 84134 ASSAY OF PREALBUMIN: CPT

## 2018-02-09 PROCEDURE — 36415 COLL VENOUS BLD VENIPUNCTURE: CPT

## 2018-02-09 ASSESSMENT — PAIN DESCRIPTION - LOCATION: LOCATION: BACK;BUTTOCKS;LEG

## 2018-02-09 ASSESSMENT — PAIN SCALES - GENERAL: PAINLEVEL_OUTOF10: 0

## 2018-02-09 NOTE — PROGRESS NOTES
Tissue Mobilization, Modalities, Home Exercise Program, Pain Management, ADL/Self-care Training    Goals:  Patient goals : Patient would like to be able to tolerate standing without back pain and leg symptoms. Be able to sit in car or chair more comfortably. Climb steps with more ease. Short term goals  Time Frame for Short term goals: 3 weeks  Short term goal 1: Oswestry from 12/50 @24% to no greater than 15%. Short term goal 2: Patient to report of decreased frequency of radicular symptoms RLE by 50% and centralization of symptoms to center low back lumbar level. Short term goal 3: Patient to demonstrate proper body mechanics. Long term goals  Time Frame for Long term goals : 6 weeks  Long term goal 1: Oswestry from 15% to no greater than 10%. Long term goal 2: Lumbar ROM painfree and WNLS without radicular symptoms or low back pain. Long term goal 3: Patient to demonstrate increased core strength with ability to complete back stabilization ex at 20 reps and maintain optimal lumbar neutral position.    Long term goal 4: Patient to demonstrate independence in home ex program.          Hellen Lennox, [address] [facility]

## 2018-02-12 ENCOUNTER — HOSPITAL ENCOUNTER (OUTPATIENT)
Dept: PHYSICAL THERAPY | Age: 53
Setting detail: THERAPIES SERIES
Discharge: HOME | End: 2018-02-12
Payer: COMMERCIAL

## 2018-02-12 PROCEDURE — 97110 THERAPEUTIC EXERCISES: CPT

## 2018-02-12 ASSESSMENT — PAIN SCALES - GENERAL: PAINLEVEL_OUTOF10: 0

## 2018-02-12 NOTE — PROGRESS NOTES
[facility]     Time In: 813  Time Out: 0845  Minutes: 32  Timed Code Treatment Minutes: 32 Minutes     Date: 2018  Patient Name: Garland Aase,  Gender:  female        CSN: [id number]   : 1965  (46 y.o.)       Referring Practitioner: CAROLINE Kiran      Diagnosis: M54.41, G89.29 (ICD-10-CM) - Chronic low back pain with right-sided sciatica, unspecified back pain laterality  Treatment Diagnosis: low back pain with RLE radiculopathy effecting standing and walking. Additional Pertinent Hx: comorbidities: gastric sleeve 17, HTN                  General:  PT Visit Information  Onset Date: 18  PT Insurance Information: [facility]  aquatic therapy covered, modalities covered, no visit limit, no precert  Total # of Visits to Date: 6  Plan of Care/Certification Expiration Date: 18  Progress Note Counter: 6/10 for PN               Subjective:  Chart Reviewed: Yes  Family / Caregiver Present: No  Comments: Follow up with CAROLINE Kiran 18. Subjective: Patient reports of only tightness at center of low back from sitting in car. She was able to do flexion ex over weekend without producing symptoms of back and leg symptoms. Patient has been continuing with standing back extension with OP during day. Watching body mechanics when she had to lift a bag of dog food. Went to grocery and was able to aboilish back symptoms when it came on and did it 2x in thet store. Pain:  Patient Currently in Pain: No  Pain Assessment: 0-10  Pain Level: 0      Objective           Exercises  Exercise 1: Patient to use lumbar roll when sitting. Change position every 20 minutes. Exercise 2: Pain level beginning of session 0/10 right low back . Exercise 3: standing back extension 2x 5  0/10 with no back pain  Exercise 5: standing back extension with patient overpressure.   2x5  0/10

## 2018-02-13 ENCOUNTER — OFFICE VISIT (OUTPATIENT)
Dept: BARIATRICS/WEIGHT MGMT | Age: 53
End: 2018-02-13

## 2018-02-13 VITALS — HEIGHT: 64 IN | WEIGHT: 293 LBS | BODY MASS INDEX: 50.02 KG/M2

## 2018-02-13 DIAGNOSIS — Z98.84 S/P LAPAROSCOPIC SLEEVE GASTRECTOMY: Primary | ICD-10-CM

## 2018-02-13 NOTE — PROGRESS NOTES
Supplement: Nectar Protein Chocolate with skim milk once per day = ~ 31 grams protein daily with PB2 powder    Fluid intake: Improved intake with water at least 64 oz per day    Multivitamin/mineral intake: Celebrate Multivatimin chewable - how many/day: BID- pt desires to switch to capsule bariatric MVI and this was reviewed with patient    Calcium intake: Yes. Calcium Citrate Celebrate chewable BID- ok to continue BID while continue to consume daily on daily basis (yogurt, milk)    Other: B12 daily-  Pt will stop this when switches to capsule brand    Does patient exercise: daily- strength training and cardio at [place] [id number] to log and track food choices including protein and fluids. Pt taking bariatric vitamins as recommended per ASMBS guidelines. Overall improvement with fluid intake. Suspect lower pre-albumin multifactorial with increased weight loss over last three months      Plan  Plan/Recommendations: Educated patient on three month post op nutrition guidelines. Discussed capsule bariatric vitamins. Await results of SECA scale at appointment tomorrow in relation to low pre-albumin. Positive reinforcement given. Pt can contact RD with any additional questions. -  Patient Instructions   Goals:  1. Continue bariatric vitamins as you are currently taking. Can switch to Bariatric Advantage Ultra Multivitamin with iron take two capsules in morning and one capsule at nite or vice versa. Continue Calcium Citrate 500 mg twice a day. Can stop taking extra B12 when switch to capsule Multivitamin. 2. Goal continues to be 60-80 grams protein per day. Focus on choosing protein foods first at meals to remain full and maintain muscle mass while you continue to lose from body fat stores. 3. Regular physical activity is important if desire to continue weight loss efforts. Recommend regular cardiac activity and strength training 2-3 days per week.   4.  Water goal is 64 oz per day and make sure no liquids 30 minutes before meals and no liquids 30 minutes after meals  5. Take 20-30 minutes to eat meals and chew all foods well for best tolerance  6. Can try adding fresh fruit into daily meal plan. Try adding one piece every other day. Arrive 10 minutes early at next visit for SECA scale measurement and get lab work done at least 5 -7 days  before next office visit.           Recommended Follow-Up: six months op with PA and lab work    Jamie Stearns RD, [address] [facility]

## 2018-02-14 ENCOUNTER — OFFICE VISIT (OUTPATIENT)
Dept: BARIATRICS/WEIGHT MGMT | Age: 53
End: 2018-02-14

## 2018-02-14 VITALS
RESPIRATION RATE: 18 BRPM | SYSTOLIC BLOOD PRESSURE: 130 MMHG | HEART RATE: 64 BPM | DIASTOLIC BLOOD PRESSURE: 84 MMHG | HEIGHT: 64 IN | WEIGHT: 293 LBS | TEMPERATURE: 97.7 F | BODY MASS INDEX: 50.02 KG/M2

## 2018-02-14 DIAGNOSIS — Z98.84 S/P LAPAROSCOPIC SLEEVE GASTRECTOMY: ICD-10-CM

## 2018-02-14 DIAGNOSIS — Z13.21 MALNUTRITION SCREEN: ICD-10-CM

## 2018-02-14 DIAGNOSIS — E66.01 MORBID OBESITY WITH BMI OF 50.0-59.9, ADULT (HCC): Primary | ICD-10-CM

## 2018-02-14 DIAGNOSIS — K91.2 POSTOPERATIVE INTESTINAL MALABSORPTION: ICD-10-CM

## 2018-02-14 DIAGNOSIS — K21.9 GASTROESOPHAGEAL REFLUX DISEASE, ESOPHAGITIS PRESENCE NOT SPECIFIED: ICD-10-CM

## 2018-02-14 LAB — VITAMIN B1 WHOLE BLOOD: 131 NMOL/L (ref 70–180)

## 2018-02-14 PROCEDURE — 99024 POSTOP FOLLOW-UP VISIT: CPT | Performed by: PHYSICIAN ASSISTANT

## 2018-02-14 NOTE — PROGRESS NOTES
Component Value Date    VITD25 42 02/09/2018        VITAMIN B1/ THIAMINE   Lab Results   Component Value Date    LUTX8DWFAIF 131 02/09/2018        RBC FOLATE   Lab Results   Component Value Date    FOLATE > 20.0 08/30/2017        LIPID SCREEN (FASTING)   Lab Results   Component Value Date    CHOL 209 05/30/2017    TRIG 130 05/30/2017    HDL 54 05/30/2017    LDLCALC 129 05/30/2017   ,     HGA1C (T2DM ONLY)   Lab Results   Component Value Date    LABA1C 5.5 08/30/2017    AVGG 105 08/30/2017        TSH   Lab Results   Component Value Date    TSH 1.040 08/30/2017        IRON   Lab Results   Component Value Date    IRON 95 08/30/2017        IONIZED CALCIUM   No results found for: XANDER NAVARRO      Assessment:      1. Morbid obesity with BMI of 50.0-59.9, adult (HCC)  CBC Auto Differential    Ferritin    Hemoglobin A1C    Iron    Iron Binding Capacity    Comprehensive Metabolic Panel    Prealbumin    Vitamin B1    Vitamin D 25 Hydroxy    TSH with Reflex    PTH, Intact   2. S/P laparoscopic sleeve gastrectomy  CBC Auto Differential    Ferritin    Hemoglobin A1C    Iron    Iron Binding Capacity    Comprehensive Metabolic Panel    Prealbumin    Vitamin B1    Vitamin D 25 Hydroxy    TSH with Reflex    PTH, Intact   3. Malnutrition screen  CBC Auto Differential    Ferritin    Hemoglobin A1C    Iron    Iron Binding Capacity    Comprehensive Metabolic Panel    Prealbumin    Vitamin B1    Vitamin D 25 Hydroxy    TSH with Reflex    PTH, Intact   4. Postoperative intestinal malabsorption  CBC Auto Differential    Ferritin    Hemoglobin A1C    Iron    Iron Binding Capacity    Comprehensive Metabolic Panel    Prealbumin    Vitamin B1    Vitamin D 25 Hydroxy    TSH with Reflex    PTH, Intact     Plan:    Stay well hydrated. Drink a minimum of 64 oz of non-carbonated, non-caffeinated fluids daily. Nutritional education occurred during visit. Tolerating diet.  Continue following with dietitian and follow their recommendations as

## 2018-02-15 ENCOUNTER — HOSPITAL ENCOUNTER (OUTPATIENT)
Dept: PHYSICAL THERAPY | Age: 53
Setting detail: THERAPIES SERIES
Discharge: HOME | End: 2018-02-15
Payer: COMMERCIAL

## 2018-02-15 PROCEDURE — 97110 THERAPEUTIC EXERCISES: CPT

## 2018-02-15 ASSESSMENT — PAIN SCALES - GENERAL: PAINLEVEL_OUTOF10: 0

## 2018-02-15 ASSESSMENT — PAIN DESCRIPTION - PAIN TYPE: TYPE: CHRONIC PAIN

## 2018-02-15 NOTE — PROGRESS NOTES
[place]     Time In: 0035  Time Out: 1471  Minutes: 50  Timed Code Treatment Minutes: 50 Minutes     Date: 2/15/2018  Patient Name: Radha Dominique,  Gender:  female        CSN: [id number]   : 1965  (46 y.o.)       Referring Practitioner: CAROLINE Madrid      Diagnosis: M54.41, G89.29 (ICD-10-CM) - Chronic low back pain with right-sided sciatica, unspecified back pain laterality  Treatment Diagnosis: low back pain with RLE radiculopathy effecting standing and walking. Additional Pertinent Hx: comorbidities: gastric sleeve 17, HTN                  General:  PT Visit Information  Onset Date: 18  PT Insurance Information: [facility]  aquatic therapy covered, modalities covered, no visit limit, no precert  Total # of Visits to Date: 7  Plan of Care/Certification Expiration Date: 18  Progress Note Counter:  for PN               Subjective:  Chart Reviewed: Yes  Family / Caregiver Present: No  Comments: Follow up with CAROLINE Madrid 18. Subjective: Patient reports of a few twinges down right leg to buttock posterior thigh yesterday. Patient did not get her ex in as much as she should have. She states she is better today. 0/10 pain and symptoms in right leg. Patient feels she is 95% better. \"I can stand and walk a lot longer. \"  Negotiating steps from non reciprocal to reciprocally are better. She does not have to hold on to rail most the time. Pain:  Patient Currently in Pain: No  Pain Assessment: 0-10  Pain Level: 0  Pain Type: Chronic pain      Objective        Exercises  Exercise 1: Patient to use lumbar roll when sitting. Change position every 20 minutes. Exercise 2: Pain level beginning of session 0/10 right low back . Exercise 3: standing back extension 2x 5  0/10 with no back pain  Exercise 4: Note no lateral shift at lumbar curve.   Flexion lunges and partial squats. Continue home ex program as instructed previously. Standing back extension and prone press ups  as prophylaxis. Increase reps on stabilization ex. Plan:  Times per week: 2x  Plan weeks: 6 weeks  Specific instructions for Next Treatment: Progress dynamic back stabilization ex and static strengthening of core musculature. Continue to use back extension in standing as prophylaxis following ex. Current Treatment Recommendations: Strengthening, Home Exercise Program, Safety Education & Training    Goals:  Patient goals : Patient would like to be able to tolerate standing without back pain and leg symptoms. Be able to sit in car or chair more comfortably. Climb steps with more ease. GOAL BEING MET She no longer has pain sitting in car for >40 minutes. She is able to sit in meetings without pain. She feels she is 95% better. She has had no symptoms for past week and states yesterday was the first day she had twinges with sit to stand following prolonged sitting. She feels she is doing excellent. Short term goals  Time Frame for Short term goals: 3 weeks 2/15/18   Short term goal 1: Oswestry from 12/50 @24% to no greater than GOAL MET Oswestry score 7/50 @ 14%. Short term goal 2: Patient to report of decreased frequency of radicular symptoms RLE by 50% and centralization of symptoms to center low back lumbar level. GOAL MET Patient is having no radicular symptoms and back pain. Notes less stiffness at back and no pain as before PT was intiatetd. Short term goal 3: Patient to demonstrate proper body mechanics. GOAL MET Patient has been educated in no flexion at waist to retrieve object from floor. Bend at knees. Getting in and out of bed logrolling technique. Long term goals  Time Frame for Long term goals : 6 weeks (3 week assessment on 2/15/18)   Long term goal 1: Oswestry from 15% to no greater than 10%. GOAL NOT MET Oswestry at 3 weeks at 14%.    Long

## 2018-02-20 ENCOUNTER — HOSPITAL ENCOUNTER (OUTPATIENT)
Dept: PHYSICAL THERAPY | Age: 53
Setting detail: THERAPIES SERIES
Discharge: HOME | End: 2018-02-20
Payer: COMMERCIAL

## 2018-02-20 PROCEDURE — 97110 THERAPEUTIC EXERCISES: CPT

## 2018-02-20 ASSESSMENT — PAIN SCALES - GENERAL: PAINLEVEL_OUTOF10: 0

## 2018-02-20 NOTE — PROGRESS NOTES
standing partial squats abdominal bracing  x10  HOLD on due to bothering bilateral knees/   Exercise 11: standing back extension  2x5  with endrange patient overpressure 0/10 back and leg symptoms. Exercise 12: sit<>stand hinging from hips  repeat 10x   Exercise 13: standing lunges holding on to small orange stability ball: x5  Exercise 14: repeat standing back extension with OP x5.  0/10 symptoms. Exercise 15: hydrostick fwd/back x15, side to side x15 , cw/ccw x10   Exercise 16: standing heelraises/toe raises x15. Exercise 17: standing abdominal bracing red band:  with rows, shoulder extension and horizontal abduction/adduction x15   Exercise 18: Nautilus: back extension 40# x15   Exercise 19: Nautilus Low Rows 40# x15          Activity Tolerance:  Activity Tolerance: Patient Tolerated treatment well  Activity Tolerance: 0/10 symptoms centralizaiton of back pain,  Progressed stabilization ex to more dynamic strengthening    Assessment: Body structures, Functions, Activity limitations: Decreased functional mobility , Decreased strength, Decreased ADL status  Assessment: Progressed patient with dynamic stabilization and strengthening ex with hydrostick, UE stabilization ex with red resistance band, Nautilus with low rows and back extension. 0/10 symptoms at back and RLE. Symptoms centralized and stable. at back now. Continue to do standing ext between back stabilizaiton ex. Prognosis: Good  Discharge Recommendations: Continue to assess pending progress    Patient Education:  Patient Education: Continue HEP. HOlD on partial squats. Add red theraband for UE ex for back stabilization. Do prone press ups in AM. Continue back extension in standing during day as needed. Plan:  Times per week: 2x  Plan weeks: 6 weeks  Specific instructions for Next Treatment: Progress dynamic back stabilization ex and static strengthening of core musculature.  Continue to use back extension in standing as prophylaxis following ex. Current Treatment Recommendations: Strengthening, Home Exercise Program, Safety Education & Training    Goals:  Patient goals : Patient would like to be able to tolerate standing without back pain and leg symptoms. Be able to sit in car or chair more comfortably. Climb steps with more ease. GOAL BEING MET She no longer has pain sitting in car for >40 minutes. She is able to sit in meetings without pain. She feels she is 95% better. She has had no symptoms for past week and states yesterday was the first day she had twinges with sit to stand following prolonged sitting. She feels she is doing excellent. Short term goals  Time Frame for Short term goals: 3 weeks 2/15/18   Short term goal 1: Oswestry from 12/50 @24% to no greater than GOAL MET Oswestry score 7/50 @ 14%. Short term goal 2: Patient to report of decreased frequency of radicular symptoms RLE by 50% and centralization of symptoms to center low back lumbar level. GOAL MET Patient is having no radicular symptoms and back pain. Notes less stiffness at back and no pain as before PT was intiatetd. Short term goal 3: Patient to demonstrate proper body mechanics. GOAL MET Patient has been educated in no flexion at waist to retrieve object from floor. Bend at knees. Getting in and out of bed logrolling technique. Long term goals  Time Frame for Long term goals : 6 weeks (3 week assessment on 2/15/18)   Long term goal 1: Oswestry from 15% to no greater than 10%. GOAL NOT MET Oswestry at 3 weeks at 14%. Long term goal 2: Lumbar ROM painfree and WNLS without radicular symptoms or low back pain. GOAL MET Lumbar ROM WNLS, Patient is able to flex forward with palms to floor now and extension WNLS. Long term goal 3: Patient to demonstrate increased core strength with ability to complete back stabilization ex at 20 reps and maintain optimal lumbar neutral position.  GOAL  PARTIALLY MET Patient is doing 15 reps in hooklying

## 2018-02-22 ENCOUNTER — HOSPITAL ENCOUNTER (OUTPATIENT)
Dept: PHYSICAL THERAPY | Age: 53
Setting detail: THERAPIES SERIES
Discharge: HOME | End: 2018-02-22
Payer: COMMERCIAL

## 2018-02-22 PROCEDURE — 97110 THERAPEUTIC EXERCISES: CPT

## 2018-02-22 ASSESSMENT — PAIN DESCRIPTION - LOCATION: LOCATION: BACK

## 2018-02-22 ASSESSMENT — PAIN DESCRIPTION - PAIN TYPE: TYPE: CHRONIC PAIN

## 2018-02-22 ASSESSMENT — PAIN SCALES - GENERAL: PAINLEVEL_OUTOF10: 0

## 2018-02-22 ASSESSMENT — PAIN DESCRIPTION - DESCRIPTORS: DESCRIPTORS: TIGHTNESS

## 2018-02-22 NOTE — PROGRESS NOTES
[place]     Time In: 915  Time Out: [address]  Minutes: 50  Timed Code Treatment Minutes: 50 Minutes     Date: 2018  Patient Name: Bran Figueroa,  Gender:  female        CSN: [id number]   : 1965  (46 y.o.)       Referring Practitioner: CAROLINE Woods      Diagnosis: M54.41, G89.29 (ICD-10-CM) - Chronic low back pain with right-sided sciatica, unspecified back pain laterality  Treatment Diagnosis: low back pain with RLE radiculopathy effecting standing and walking. Additional Pertinent Hx: comorbidities: gastric sleeve 17, HTN                  General:  PT Visit Information  Onset Date: 18  PT Insurance Information: Medical [facility]  aquatic therapy covered, modalities covered, no visit limit, no precert  Total # of Visits to Date: 9  Plan of Care/Certification Expiration Date: 18  Progress Note Counter:  for PN               Subjective:  Chart Reviewed: Yes  Family / Caregiver Present: No  Comments: Follow up with CAROLINE Woods 2018     Subjective: Patient reports of generalized soreness at low back with tightness. Notes she has done some extension ex and that has helped. Notes in night had RLE to calf and had restless legs. Patient tolerated new ex well last session with no flare up of back or leg symptoms. Pain:  Patient Currently in Pain: No  Pain Assessment: 0-10  Pain Level: 0  Pain Type: Chronic pain  Pain Location: Back  Pain Descriptors: Tightness      Objective    Exercises  Exercise 1: Patient to use lumbar roll when sitting. Change position every 20 minutes. Discussed nightroll at night when sleeping. Roll up beach towel in belt and use it during nightime if finding that back and leg symptoms flare up. Exercise 2: Pain level beginning of session 0/10 right low back . 0/10 leg symptoms Just stiffnes reports at low back  no leg symptoms. goal 1: Oswestry from 15% to no greater than 10%. GOAL NOT MET Oswestry at 3 weeks at 14%. Long term goal 2: Lumbar ROM painfree and WNLS without radicular symptoms or low back pain. GOAL MET Lumbar ROM WNLS, Patient is able to flex forward with palms to floor now and extension WNLS. Long term goal 3: Patient to demonstrate increased core strength with ability to complete back stabilization ex at 20 reps and maintain optimal lumbar neutral position. GOAL  PARTIALLY MET Patient is doing 15 reps in hooklying stabilization ex and 10 reps of standing stabilizaiton exercises. Continue goal.   Long term goal 4: Patient to demonstrate independence in home ex program. ONGOING. Exercises have centralized to abolished back and leg symptoms  Working on core strength now.            Severino Larose, [address] [facility]

## 2018-02-26 ENCOUNTER — HOSPITAL ENCOUNTER (OUTPATIENT)
Dept: PHYSICAL THERAPY | Age: 53
Setting detail: THERAPIES SERIES
Discharge: HOME | End: 2018-02-26
Payer: COMMERCIAL

## 2018-02-26 NOTE — PROGRESS NOTES
[facility]  PHYSICAL THERAPY MISSED TREATMENT NOTE  OUTPATIENT REHABILITATION    Date: 2018  Patient Name: Juliana Perez        MRN: [id number]   : 1965  (46 y.o.)  Gender: female           PT Visit Information  Canceled Appointment: 1    REASON FOR MISSED TREATMENT:  Patient called and cancelled 800am appointment due to a meeting. Attempted to call back per requext and left a voice message she could call and reschedule.   Points, [address]

## 2018-03-01 ENCOUNTER — HOSPITAL ENCOUNTER (OUTPATIENT)
Dept: PHYSICAL THERAPY | Age: 53
Setting detail: THERAPIES SERIES
Discharge: HOME | End: 2018-03-01
Payer: COMMERCIAL

## 2018-03-01 PROCEDURE — 97110 THERAPEUTIC EXERCISES: CPT

## 2018-03-01 ASSESSMENT — PAIN DESCRIPTION - PAIN TYPE: TYPE: CHRONIC PAIN

## 2018-03-01 ASSESSMENT — PAIN SCALES - GENERAL: PAINLEVEL_OUTOF10: 0

## 2018-03-01 NOTE — PROGRESS NOTES
educated in no flexion at waist to retrieve object from floor. Bend at knees. Getting in and out of bed logrolling technique. Long term goals  Time Frame for Long term goals : 6 weeks (3 week assessment on 2/15/18) Revised on 3/1/18 Discharge  Long term goal 1: Oswestry from 15% to no greater than 10%. GOAL NOT MET Oswestry 3/50 at 6%  3/1/18   Long term goal 2: Lumbar ROM painfree and WNLS without radicular symptoms or low back pain. GOAL MET Lumbar ROM WNLS, Patient is able to flex forward with palms to floor now and extension WNLS. Long term goal 3: Patient to demonstrate increased core strength with ability to complete back stabilization ex at 20 reps and maintain optimal lumbar neutral position. GOAL  MET Patient is doing 20 reps in hooklying stabilization ex and 15-20  reps of standing stabilizaiton exercises. Patient has progressed with Nautilus equipment. Long term goal 4: Patient to demonstrate independence in home ex program. GOAL MET Patient has home ex program to continue with for management of derangement symptoms , flexibility and back stabilizaiton with core strengthening.  Patient plans to continue ex with Helio Kilgore in Weight Management Program.      PT G-Codes  Functional Assessment Tool Used: Oswestry back   Score: 3/50 6%     Steven Dooley, [address] [facility]

## 2018-03-20 VITALS — WEIGHT: 293 LBS | BODY MASS INDEX: 60.41 KG/M2

## 2018-04-11 ENCOUNTER — EMPLOYEE WELLNESS (OUTPATIENT)
Dept: OTHER | Age: 53
End: 2018-04-11

## 2018-04-11 LAB
CHOLESTEROL, TOTAL: 216 MG/DL (ref 0–199)
FASTING: YES
GLUCOSE BLD-MCNC: 93 MG/DL (ref 74–109)
HDLC SERPL-MCNC: 53 MG/DL (ref 40–90)
LDL CHOLESTEROL CALCULATED: 142 MG/DL
TRIGL SERPL-MCNC: 107 MG/DL (ref 0–199)

## 2018-04-16 VITALS — BODY MASS INDEX: 48.23 KG/M2 | WEIGHT: 281 LBS

## 2018-05-17 ENCOUNTER — HOSPITAL ENCOUNTER (OUTPATIENT)
Age: 53
Discharge: HOME | End: 2018-05-17
Payer: COMMERCIAL

## 2018-05-17 DIAGNOSIS — E66.01 MORBID OBESITY WITH BMI OF 50.0-59.9, ADULT (HCC): ICD-10-CM

## 2018-05-17 DIAGNOSIS — Z98.84 S/P LAPAROSCOPIC SLEEVE GASTRECTOMY: ICD-10-CM

## 2018-05-17 DIAGNOSIS — Z13.21 MALNUTRITION SCREEN: ICD-10-CM

## 2018-05-17 DIAGNOSIS — K91.2 POSTOPERATIVE INTESTINAL MALABSORPTION: ICD-10-CM

## 2018-05-17 LAB
ALBUMIN SERPL-MCNC: 4.2 G/DL (ref 3.5–5.1)
ALP BLD-CCNC: 90 U/L (ref 38–126)
ALT SERPL-CCNC: 16 U/L (ref 11–66)
ANION GAP SERPL CALCULATED.3IONS-SCNC: 12 MEQ/L (ref 8–16)
AST SERPL-CCNC: 19 U/L (ref 5–40)
AVERAGE GLUCOSE: 84 MG/DL (ref 70–126)
BASOPHILS # BLD: 1.1 %
BASOPHILS ABSOLUTE: 0.1 THOU/MM3 (ref 0–0.1)
BILIRUB SERPL-MCNC: 0.5 MG/DL (ref 0.3–1.2)
BUN BLDV-MCNC: 20 MG/DL (ref 7–22)
CALCIUM SERPL-MCNC: 9.4 MG/DL (ref 8.5–10.5)
CHLORIDE BLD-SCNC: 101 MEQ/L (ref 98–111)
CO2: 27 MEQ/L (ref 23–33)
CREAT SERPL-MCNC: 0.8 MG/DL (ref 0.4–1.2)
EOSINOPHIL # BLD: 3.9 %
EOSINOPHILS ABSOLUTE: 0.2 THOU/MM3 (ref 0–0.4)
FERRITIN: 149 NG/ML (ref 10–291)
GFR SERPL CREATININE-BSD FRML MDRD: 75 ML/MIN/1.73M2
GLUCOSE BLD-MCNC: 87 MG/DL (ref 70–108)
HBA1C MFR BLD: 4.8 % (ref 4.4–6.4)
HCT VFR BLD CALC: 40.8 % (ref 37–47)
HEMOGLOBIN: 14 GM/DL (ref 12–16)
IRON: 71 UG/DL (ref 50–170)
LYMPHOCYTES # BLD: 24.9 %
LYMPHOCYTES ABSOLUTE: 1.3 THOU/MM3 (ref 1–4.8)
MCH RBC QN AUTO: 29.5 PG (ref 27–31)
MCHC RBC AUTO-ENTMCNC: 34.4 GM/DL (ref 33–37)
MCV RBC AUTO: 85.6 FL (ref 81–99)
MONOCYTES # BLD: 8.8 %
MONOCYTES ABSOLUTE: 0.5 THOU/MM3 (ref 0.4–1.3)
NUCLEATED RED BLOOD CELLS: 0 /100 WBC
PDW BLD-RTO: 13.1 % (ref 11.5–14.5)
PLATELET # BLD: 256 THOU/MM3 (ref 130–400)
PMV BLD AUTO: 9.4 FL (ref 7.4–10.4)
POTASSIUM SERPL-SCNC: 4.1 MEQ/L (ref 3.5–5.2)
PREALBUMIN: 15.9 MG/DL (ref 20–40)
PTH INTACT: 51.5 PG/ML (ref 15–65)
RBC # BLD: 4.76 MILL/MM3 (ref 4.2–5.4)
SEG NEUTROPHILS: 61.3 %
SEGMENTED NEUTROPHILS ABSOLUTE COUNT: 3.2 THOU/MM3 (ref 1.8–7.7)
SODIUM BLD-SCNC: 140 MEQ/L (ref 135–145)
TOTAL IRON BINDING CAPACITY: 282 UG/DL (ref 171–450)
TOTAL PROTEIN: 7.2 G/DL (ref 6.1–8)
TSH SERPL DL<=0.05 MIU/L-ACNC: 1.12 UIU/ML (ref 0.4–4.2)
VITAMIN D 25-HYDROXY: 33 NG/ML (ref 30–100)
WBC # BLD: 5.2 THOU/MM3 (ref 4.8–10.8)

## 2018-05-17 PROCEDURE — 84443 ASSAY THYROID STIM HORMONE: CPT

## 2018-05-17 PROCEDURE — 83540 ASSAY OF IRON: CPT

## 2018-05-17 PROCEDURE — 80053 COMPREHEN METABOLIC PANEL: CPT

## 2018-05-17 PROCEDURE — 82306 VITAMIN D 25 HYDROXY: CPT

## 2018-05-17 PROCEDURE — 85025 COMPLETE CBC W/AUTO DIFF WBC: CPT

## 2018-05-17 PROCEDURE — 83970 ASSAY OF PARATHORMONE: CPT

## 2018-05-17 PROCEDURE — 83036 HEMOGLOBIN GLYCOSYLATED A1C: CPT

## 2018-05-17 PROCEDURE — 36415 COLL VENOUS BLD VENIPUNCTURE: CPT

## 2018-05-17 PROCEDURE — 84425 ASSAY OF VITAMIN B-1: CPT

## 2018-05-17 PROCEDURE — 83550 IRON BINDING TEST: CPT

## 2018-05-17 PROCEDURE — 84134 ASSAY OF PREALBUMIN: CPT

## 2018-05-17 PROCEDURE — 82728 ASSAY OF FERRITIN: CPT

## 2018-05-21 ENCOUNTER — OFFICE VISIT (OUTPATIENT)
Dept: BARIATRICS/WEIGHT MGMT | Age: 53
End: 2018-05-21
Payer: COMMERCIAL

## 2018-05-21 VITALS
BODY MASS INDEX: 46.16 KG/M2 | HEART RATE: 76 BPM | HEIGHT: 64 IN | WEIGHT: 270.4 LBS | SYSTOLIC BLOOD PRESSURE: 114 MMHG | RESPIRATION RATE: 16 BRPM | DIASTOLIC BLOOD PRESSURE: 70 MMHG | TEMPERATURE: 97.9 F

## 2018-05-21 DIAGNOSIS — K21.9 GASTROESOPHAGEAL REFLUX DISEASE, ESOPHAGITIS PRESENCE NOT SPECIFIED: ICD-10-CM

## 2018-05-21 DIAGNOSIS — Z98.84 S/P LAPAROSCOPIC SLEEVE GASTRECTOMY: Primary | ICD-10-CM

## 2018-05-21 DIAGNOSIS — F32.A DEPRESSION, UNSPECIFIED DEPRESSION TYPE: ICD-10-CM

## 2018-05-21 LAB — VITAMIN B1 WHOLE BLOOD: 114 NMOL/L (ref 70–180)

## 2018-05-21 PROCEDURE — 99213 OFFICE O/P EST LOW 20 MIN: CPT | Performed by: PHYSICIAN ASSISTANT

## 2018-08-29 ENCOUNTER — OFFICE VISIT (OUTPATIENT)
Dept: BARIATRICS/WEIGHT MGMT | Age: 53
End: 2018-08-29
Payer: COMMERCIAL

## 2018-08-29 VITALS
HEART RATE: 80 BPM | DIASTOLIC BLOOD PRESSURE: 76 MMHG | TEMPERATURE: 98.5 F | SYSTOLIC BLOOD PRESSURE: 114 MMHG | RESPIRATION RATE: 18 BRPM | WEIGHT: 257 LBS | BODY MASS INDEX: 43.87 KG/M2 | HEIGHT: 64 IN

## 2018-08-29 DIAGNOSIS — Z98.84 S/P LAPAROSCOPIC SLEEVE GASTRECTOMY: Primary | ICD-10-CM

## 2018-08-29 DIAGNOSIS — F32.A DEPRESSION, UNSPECIFIED DEPRESSION TYPE: ICD-10-CM

## 2018-08-29 DIAGNOSIS — K91.2 POSTSURGICAL MALABSORPTION: ICD-10-CM

## 2018-08-29 DIAGNOSIS — E66.01 MORBID OBESITY WITH BMI OF 40.0-44.9, ADULT (HCC): ICD-10-CM

## 2018-08-29 DIAGNOSIS — Z13.21 SCREENING FOR MALNUTRITION: ICD-10-CM

## 2018-08-29 DIAGNOSIS — K21.9 GASTROESOPHAGEAL REFLUX DISEASE, ESOPHAGITIS PRESENCE NOT SPECIFIED: ICD-10-CM

## 2018-08-29 PROCEDURE — 99213 OFFICE O/P EST LOW 20 MIN: CPT | Performed by: PHYSICIAN ASSISTANT

## 2018-08-29 RX ORDER — RANITIDINE 150 MG/1
150 TABLET ORAL NIGHTLY
Qty: 30 TABLET | Refills: 2 | Status: SHIPPED | OUTPATIENT
Start: 2018-08-29

## 2018-08-29 RX ORDER — CALCIUM CARBONATE 200(500)MG
1 TABLET,CHEWABLE ORAL PRN
COMMUNITY

## 2018-08-29 NOTE — PROGRESS NOTES
[facility] PROFESSIONAL SERVS  [facility]  [address] , [address]  [address] [address]  Dept: [phone]  Loc: [phone]      Visit Date:  8/29/2018  Weight Management Postop Follow-up    HPI:      Roldan Medina is a 48 y.o. female who is here today for 9 month follow up since  laparoscopic sleeve gastrectomy performed by Dr. Amie Liang  on 11/20/17. Feeling really good. Walked first [facility]. No longer having joint pains. Hit a plateau with weight loss over the last couple weeks. Down 13# since last visit. Down 89# since surgery. Down 108# since starting with weight management. BMI 44 . Drinking 64-96 oz of fluid and over 60 grams of protein daily. Drinking one protein shake daily. Tolerating post-op diet. No problems with bowel movements. No nausea. No emesis. No GERD/ Reflux-continues to take PPI and TUMS QHS prn. She is having break through reflux at night that is causing her to choke. Using 2 pillows, not eating before bedtime and has not found trigger foods. Denies CP/SOB. No Dizziness. No abdominal pain. No incisional discomfort. No sx of dehydration. Taking and tolerating all vitamins. Continues to take Celexa daily with benefit. Physical Activity:  Walking 2-3 miles daily. Plans to add strength training prior to next apt. Current BMI: Body mass index is 44.11 kg/m².   Current Weight:   Wt Readings from Last 3 Encounters:   08/29/18 257 lb (116.6 kg)   05/21/18 270 lb 6.4 oz (122.7 kg)   04/11/18 281 lb (127.5 kg)     Initial Weight : 365  Pre-op Body Weight: 346      Past Medical History:  Past Medical History:   Diagnosis Date    GERD (gastroesophageal reflux disease)     Hypertension     Infertility, female     Nausea & vomiting     PONV (postoperative nausea and vomiting)        Past Surgical History:  Past Surgical History:   Procedure Laterality Date    ABDOMEN SURGERY  2003    RT OOPHERECTOMY    COLONOSCOPY      DILATATION, ESOPHAGUS      ENDOMETRIAL ABLATION  LEG DEBRIDEMENT  2011    RT     OH COLON CA SCRN NOT HI RSK IND Left 10/25/2017    COLONOSCOPY performed by Dario Thayer MD at [address] [facility]    OH EGD TRANSORAL BIOPSY SINGLE/MULTIPLE Left 10/25/2017    EGD BIOPSY performed by Dario Thayer MD at [address] [facility] N/A 11/20/2017    ROBOTIC SLEEVE GASTRECTOMY performed by Belinda Hart MD at [place] [facility]       Past Social History:  Social History     Social History    Marital status:      Spouse name: N/A    Number of children: N/A    Years of education: N/A     Occupational History    Not on file. Social History Main Topics    Smoking status: Never Smoker    Smokeless tobacco: Never Used    Alcohol use No    Drug use: No    Sexual activity: Not on file     Other Topics Concern    Not on file     Social History Narrative    No narrative on file        Medications:   Current Outpatient Prescriptions   Medication Sig Dispense Refill    calcium carbonate (TUMS) 500 MG chewable tablet Take 1 tablet by mouth as needed for Heartburn      ranitidine (ZANTAC) 150 MG tablet Take 1 tablet by mouth nightly 30 tablet 2    Multiple Vitamin (MULTI-VITAMIN PO) Take 1 capsule by mouth 2 times daily      acetaminophen (TYLENOL) 325 MG tablet Take 325 mg by mouth every 6 hours as needed for Pain      Calcium Citrate-Vitamin D (CALCIUM + VIT D, BARIATRIC ADVANTAGE, CHEWABLE TABLET) Take 1 tablet by mouth 3 times daily      omeprazole (PRILOSEC) 20 MG delayed release capsule Take 20 mg by mouth daily      citalopram (CELEXA) 20 MG tablet Take 20 mg by mouth daily        No current facility-administered medications for this visit. Allergies:   No Known Allergies    Subjective:    Constitutional: Denies any fever, chills, fatigue. Wound: Denies any rash, skin color changes or wound problems. Resp: Denies any cough, shortness of breath. CV: Denies any chest pain, orthopnea or syncope.    MS: Denies myalgias, PREALBUMIN   Lab Results   Component Value Date    PREALBUMIN 15.9 05/17/2018        VITAMIN B12   Lab Results   Component Value Date    GZZHXMZX04 325 08/30/2017        24 HOUR URINE CALCIUM   No results found for: DELORIS Snyder     VITAMIN D   Lab Results   Component Value Date    VITD25 33 05/17/2018        VITAMIN B1/ THIAMINE   Lab Results   Component Value Date    HDLV2XBCAOD 114 05/17/2018        RBC FOLATE   Lab Results   Component Value Date    FOLATE > 20.0 08/30/2017        LIPID SCREEN (FASTING)   Lab Results   Component Value Date    CHOL 216 04/11/2018    TRIG 107 04/11/2018    HDL 53 04/11/2018    LDLCALC 142 04/11/2018   ,     HGA1C (T2DM ONLY)   Lab Results   Component Value Date    LABA1C 4.8 05/17/2018    AVGG 84 05/17/2018        TSH   Lab Results   Component Value Date    TSH 1.120 05/17/2018        IRON   Lab Results   Component Value Date    IRON 71 05/17/2018        IONIZED CALCIUM   No results found for: XANDER NAVARRO      Assessment:       Diagnosis Orders   1. S/P laparoscopic sleeve gastrectomy  CBC Auto Differential    Ferritin    Comprehensive Metabolic Panel    Hemoglobin A1C    Iron    Iron Binding Capacity    Lipid Panel    Prealbumin    TSH with Reflex    PTH, Intact    Vitamin B1    Vitamin D 25 Hydroxy    Vitamin B12 & Folate    ranitidine (ZANTAC) 150 MG tablet   2. Morbid obesity with BMI of 40.0-44.9, adult (HCC)  CBC Auto Differential    Ferritin    Comprehensive Metabolic Panel    Hemoglobin A1C    Iron    Iron Binding Capacity    Lipid Panel    Prealbumin    TSH with Reflex    PTH, Intact    Vitamin B1    Vitamin D 25 Hydroxy    Vitamin B12 & Folate    ranitidine (ZANTAC) 150 MG tablet   3.  Postsurgical malabsorption  CBC Auto Differential    Ferritin    Comprehensive Metabolic Panel    Hemoglobin A1C    Iron    Iron Binding Capacity    Lipid Panel    Prealbumin    TSH with Reflex    PTH, Intact    Vitamin B1    Vitamin D 25 Hydroxy    Vitamin B12 & Folate ranitidine (ZANTAC) 150 MG tablet   4. Screening for malnutrition  CBC Auto Differential    Ferritin    Comprehensive Metabolic Panel    Hemoglobin A1C    Iron    Iron Binding Capacity    Lipid Panel    Prealbumin    TSH with Reflex    PTH, Intact    Vitamin B1    Vitamin D 25 Hydroxy    Vitamin B12 & Folate    ranitidine (ZANTAC) 150 MG tablet   5. Gastroesophageal reflux disease, esophagitis presence not specified  ranitidine (ZANTAC) 150 MG tablet   6. Depression, unspecified depression type       Plan:    Stay well hydrated. Drink a minimum of 64 oz of non-carbonated, non-caffeinated fluids daily. Nutritional education occurred during visit. Tolerating diet. Continue following with dietitian and follow their recommendations as directed. Continue  60-80 grams of protein each day. Signs and symptoms reviewed with patient that would be concerning and need her to return to office for re-evaluation. Patient will call if any questions or concerns arrise. Importance of physical activity discussed with patient. Increase physical activity as tolerated  Add strength training- change routine- discussed options  Continue taking Multivitamin, Calcium and B12 as directed  Encouraged to attend support groups  1 year labs ordered- to be drawn prior to next apt  SECA scale next visit  Measurements next visit  Dietitian follow up at next visit  Zantac rx for QHS   Return in about 3 months (around 11/29/2018) for postop follow up. I spent 15 minutes in direct patient care with greater than 50% spent in counseling and coordination of care.      Electronically signed by CAROLINE Stout on 8/29/2018 at 9:56 AM

## 2018-12-12 ENCOUNTER — HOSPITAL ENCOUNTER (OUTPATIENT)
Age: 53
Discharge: HOME | End: 2018-12-12
Payer: COMMERCIAL

## 2018-12-12 DIAGNOSIS — K91.2 POSTSURGICAL MALABSORPTION: ICD-10-CM

## 2018-12-12 DIAGNOSIS — E66.01 MORBID OBESITY WITH BMI OF 40.0-44.9, ADULT (HCC): ICD-10-CM

## 2018-12-12 DIAGNOSIS — Z13.21 SCREENING FOR MALNUTRITION: ICD-10-CM

## 2018-12-12 DIAGNOSIS — Z98.84 S/P LAPAROSCOPIC SLEEVE GASTRECTOMY: ICD-10-CM

## 2018-12-12 LAB
ALBUMIN SERPL-MCNC: 4.1 G/DL (ref 3.5–5.1)
ALP BLD-CCNC: 89 U/L (ref 38–126)
ALT SERPL-CCNC: 15 U/L (ref 11–66)
ANION GAP SERPL CALCULATED.3IONS-SCNC: 13 MEQ/L (ref 8–16)
AST SERPL-CCNC: 15 U/L (ref 5–40)
AVERAGE GLUCOSE: 90 MG/DL (ref 70–126)
BASOPHILS # BLD: 1.3 %
BASOPHILS ABSOLUTE: 0.1 THOU/MM3 (ref 0–0.1)
BILIRUB SERPL-MCNC: 0.4 MG/DL (ref 0.3–1.2)
BUN BLDV-MCNC: 22 MG/DL (ref 7–22)
CALCIUM SERPL-MCNC: 9.5 MG/DL (ref 8.5–10.5)
CHLORIDE BLD-SCNC: 104 MEQ/L (ref 98–111)
CHOLESTEROL, TOTAL: 205 MG/DL (ref 100–199)
CO2: 26 MEQ/L (ref 23–33)
CREAT SERPL-MCNC: 0.7 MG/DL (ref 0.4–1.2)
EOSINOPHIL # BLD: 4.1 %
EOSINOPHILS ABSOLUTE: 0.2 THOU/MM3 (ref 0–0.4)
ERYTHROCYTE [DISTWIDTH] IN BLOOD BY AUTOMATED COUNT: 13.2 % (ref 11.5–14.5)
ERYTHROCYTE [DISTWIDTH] IN BLOOD BY AUTOMATED COUNT: 41.1 FL (ref 35–45)
FERRITIN: 103 NG/ML (ref 10–291)
FOLATE: 14.2 NG/ML (ref 4.8–24.2)
GFR SERPL CREATININE-BSD FRML MDRD: 87 ML/MIN/1.73M2
GLUCOSE BLD-MCNC: 87 MG/DL (ref 70–108)
HBA1C MFR BLD: 5 % (ref 4.4–6.4)
HCT VFR BLD CALC: 43.3 % (ref 37–47)
HDLC SERPL-MCNC: 65 MG/DL
HEMOGLOBIN: 14.2 GM/DL (ref 12–16)
IMMATURE GRANS (ABS): 0.01 THOU/MM3 (ref 0–0.07)
IMMATURE GRANULOCYTES: 0.2 %
IRON: 94 UG/DL (ref 50–170)
LDL CHOLESTEROL CALCULATED: 125 MG/DL
LYMPHOCYTES # BLD: 28.1 %
LYMPHOCYTES ABSOLUTE: 1.3 THOU/MM3 (ref 1–4.8)
MCH RBC QN AUTO: 28.3 PG (ref 26–33)
MCHC RBC AUTO-ENTMCNC: 32.8 GM/DL (ref 32.2–35.5)
MCV RBC AUTO: 86.4 FL (ref 81–99)
MONOCYTES # BLD: 8 %
MONOCYTES ABSOLUTE: 0.4 THOU/MM3 (ref 0.4–1.3)
NUCLEATED RED BLOOD CELLS: 0 /100 WBC
PLATELET # BLD: 260 THOU/MM3 (ref 130–400)
PMV BLD AUTO: 10.6 FL (ref 9.4–12.4)
POTASSIUM SERPL-SCNC: 4.4 MEQ/L (ref 3.5–5.2)
PREALBUMIN: 19.5 MG/DL (ref 20–40)
PTH INTACT: 55 PG/ML (ref 15–65)
RBC # BLD: 5.01 MILL/MM3 (ref 4.2–5.4)
SEG NEUTROPHILS: 58.3 %
SEGMENTED NEUTROPHILS ABSOLUTE COUNT: 2.7 THOU/MM3 (ref 1.8–7.7)
SODIUM BLD-SCNC: 143 MEQ/L (ref 135–145)
TOTAL IRON BINDING CAPACITY: 285 UG/DL (ref 171–450)
TOTAL PROTEIN: 7 G/DL (ref 6.1–8)
TRIGL SERPL-MCNC: 73 MG/DL (ref 0–199)
TSH SERPL DL<=0.05 MIU/L-ACNC: 1.05 UIU/ML (ref 0.4–4.2)
VITAMIN B-12: 428 PG/ML (ref 211–911)
VITAMIN D 25-HYDROXY: 32 NG/ML (ref 30–100)
WBC # BLD: 4.6 THOU/MM3 (ref 4.8–10.8)

## 2018-12-12 PROCEDURE — 82746 ASSAY OF FOLIC ACID SERUM: CPT

## 2018-12-12 PROCEDURE — 83036 HEMOGLOBIN GLYCOSYLATED A1C: CPT

## 2018-12-12 PROCEDURE — 83970 ASSAY OF PARATHORMONE: CPT

## 2018-12-12 PROCEDURE — 83550 IRON BINDING TEST: CPT

## 2018-12-12 PROCEDURE — 83540 ASSAY OF IRON: CPT

## 2018-12-12 PROCEDURE — 36415 COLL VENOUS BLD VENIPUNCTURE: CPT

## 2018-12-12 PROCEDURE — 84443 ASSAY THYROID STIM HORMONE: CPT

## 2018-12-12 PROCEDURE — 82306 VITAMIN D 25 HYDROXY: CPT

## 2018-12-12 PROCEDURE — 84134 ASSAY OF PREALBUMIN: CPT

## 2018-12-12 PROCEDURE — 80053 COMPREHEN METABOLIC PANEL: CPT

## 2018-12-12 PROCEDURE — 82728 ASSAY OF FERRITIN: CPT

## 2018-12-12 PROCEDURE — 84425 ASSAY OF VITAMIN B-1: CPT

## 2018-12-12 PROCEDURE — 80061 LIPID PANEL: CPT

## 2018-12-12 PROCEDURE — 85025 COMPLETE CBC W/AUTO DIFF WBC: CPT

## 2018-12-12 PROCEDURE — 82607 VITAMIN B-12: CPT

## 2018-12-13 ENCOUNTER — OFFICE VISIT (OUTPATIENT)
Dept: BARIATRICS/WEIGHT MGMT | Age: 53
End: 2018-12-13

## 2018-12-13 ENCOUNTER — OFFICE VISIT (OUTPATIENT)
Dept: BARIATRICS/WEIGHT MGMT | Age: 53
End: 2018-12-13
Payer: COMMERCIAL

## 2018-12-13 VITALS
SYSTOLIC BLOOD PRESSURE: 114 MMHG | HEART RATE: 84 BPM | WEIGHT: 264.6 LBS | HEIGHT: 64 IN | RESPIRATION RATE: 18 BRPM | TEMPERATURE: 98.1 F | BODY MASS INDEX: 45.17 KG/M2 | DIASTOLIC BLOOD PRESSURE: 80 MMHG

## 2018-12-13 VITALS — WEIGHT: 264.6 LBS | HEIGHT: 64 IN | BODY MASS INDEX: 45.17 KG/M2

## 2018-12-13 DIAGNOSIS — E66.01 MORBID OBESITY WITH BMI OF 45.0-49.9, ADULT (HCC): ICD-10-CM

## 2018-12-13 DIAGNOSIS — Z98.84 S/P LAPAROSCOPIC SLEEVE GASTRECTOMY: Primary | ICD-10-CM

## 2018-12-13 DIAGNOSIS — F32.A DEPRESSION, UNSPECIFIED DEPRESSION TYPE: ICD-10-CM

## 2018-12-13 DIAGNOSIS — K21.9 GASTROESOPHAGEAL REFLUX DISEASE, ESOPHAGITIS PRESENCE NOT SPECIFIED: ICD-10-CM

## 2018-12-13 PROCEDURE — 99213 OFFICE O/P EST LOW 20 MIN: CPT | Performed by: PHYSICIAN ASSISTANT

## 2018-12-13 NOTE — PATIENT INSTRUCTIONS
Goals: 1. Protein goal is 60-80 grams protein per day. Remember to choose protein foods first at meals to meet this goal, followed by vegetables, then fruit, and starch food last if still hungry. 2.  Water goal is 64 oz per day. Separate liquids from solids. No liquids 30 minutes before meals and no liquids 30 minutes after your meals. 3.  Take 20-30 minutes to eat - this helps with mindful eating as well. Do not skip meals and stick to consistent meal times schedule as much as possible. 4.  Physical activity remains important to maintain weight loss efforts. Take a short walk at work  Once a day! 5. Routine vitamin intake is important to avoid deficiencies. Continue the following:   Multivitamin capsule twice a day. Add Calcium Citrate once a day - Look for 500-600 mg pill .

## 2018-12-17 LAB — VITAMIN B1 WHOLE BLOOD: 120 NMOL/L (ref 70–180)

## 2019-09-05 ENCOUNTER — TELEPHONE (OUTPATIENT)
Dept: BARIATRICS/WEIGHT MGMT | Age: 54
End: 2019-09-05

## 2019-10-04 ENCOUNTER — HOSPITAL ENCOUNTER (OUTPATIENT)
Dept: WOMENS IMAGING | Age: 54
Discharge: HOME | End: 2019-10-04
Payer: COMMERCIAL

## 2019-10-04 DIAGNOSIS — Z13.9 VISIT FOR SCREENING: ICD-10-CM

## 2019-10-04 PROCEDURE — 77067 SCR MAMMO BI INCL CAD: CPT

## 2019-10-07 DIAGNOSIS — K91.2 POSTSURGICAL MALABSORPTION: ICD-10-CM

## 2019-10-07 DIAGNOSIS — Z98.84 S/P LAPAROSCOPIC SLEEVE GASTRECTOMY: Primary | ICD-10-CM

## 2019-10-07 DIAGNOSIS — I10 HYPERTENSION, UNSPECIFIED TYPE: ICD-10-CM

## 2019-10-07 DIAGNOSIS — E66.01 MORBID OBESITY WITH BMI OF 45.0-49.9, ADULT (HCC): ICD-10-CM

## 2019-12-02 ENCOUNTER — NURSE ONLY (OUTPATIENT)
Dept: LAB | Age: 54
End: 2019-12-02

## 2019-12-02 LAB
ALBUMIN SERPL-MCNC: 4.2 G/DL (ref 3.5–5.1)
ALP BLD-CCNC: 103 U/L (ref 38–126)
ALT SERPL-CCNC: 14 U/L (ref 11–66)
ANION GAP SERPL CALCULATED.3IONS-SCNC: 17 MEQ/L (ref 8–16)
AST SERPL-CCNC: 17 U/L (ref 5–40)
AVERAGE GLUCOSE: 99 MG/DL (ref 70–126)
BASOPHILS # BLD: 1.2 %
BASOPHILS ABSOLUTE: 0.1 THOU/MM3 (ref 0–0.1)
BILIRUB SERPL-MCNC: 0.6 MG/DL (ref 0.3–1.2)
BUN BLDV-MCNC: 15 MG/DL (ref 7–22)
CALCIUM SERPL-MCNC: 9.8 MG/DL (ref 8.5–10.5)
CHLORIDE BLD-SCNC: 97 MEQ/L (ref 98–111)
CHOLESTEROL, TOTAL: 243 MG/DL (ref 100–199)
CO2: 28 MEQ/L (ref 23–33)
CREAT SERPL-MCNC: 0.7 MG/DL (ref 0.4–1.2)
EOSINOPHIL # BLD: 4.9 %
EOSINOPHILS ABSOLUTE: 0.2 THOU/MM3 (ref 0–0.4)
ERYTHROCYTE [DISTWIDTH] IN BLOOD BY AUTOMATED COUNT: 13.1 % (ref 11.5–14.5)
ERYTHROCYTE [DISTWIDTH] IN BLOOD BY AUTOMATED COUNT: 41.9 FL (ref 35–45)
FERRITIN: 64 NG/ML (ref 10–291)
FOLATE: 12.9 NG/ML (ref 4.8–24.2)
GFR SERPL CREATININE-BSD FRML MDRD: 87 ML/MIN/1.73M2
GLUCOSE BLD-MCNC: 109 MG/DL (ref 70–108)
HBA1C MFR BLD: 5.3 % (ref 4.4–6.4)
HCT VFR BLD CALC: 43.1 % (ref 37–47)
HDLC SERPL-MCNC: 61 MG/DL
HEMOGLOBIN: 14 GM/DL (ref 12–16)
IMMATURE GRANS (ABS): 0.01 THOU/MM3 (ref 0–0.07)
IMMATURE GRANULOCYTES: 0.2 %
IRON: 92 UG/DL (ref 50–170)
LDL CHOLESTEROL CALCULATED: 149 MG/DL
LYMPHOCYTES # BLD: 21.7 %
LYMPHOCYTES ABSOLUTE: 1.1 THOU/MM3 (ref 1–4.8)
MCH RBC QN AUTO: 28.6 PG (ref 26–33)
MCHC RBC AUTO-ENTMCNC: 32.5 GM/DL (ref 32.2–35.5)
MCV RBC AUTO: 88 FL (ref 81–99)
MONOCYTES # BLD: 8.7 %
MONOCYTES ABSOLUTE: 0.4 THOU/MM3 (ref 0.4–1.3)
NUCLEATED RED BLOOD CELLS: 0 /100 WBC
PLATELET # BLD: 285 THOU/MM3 (ref 130–400)
PMV BLD AUTO: 11 FL (ref 9.4–12.4)
POTASSIUM SERPL-SCNC: 3.4 MEQ/L (ref 3.5–5.2)
PREALBUMIN: 22.9 MG/DL (ref 20–40)
PTH INTACT: 75 PG/ML (ref 15–65)
RBC # BLD: 4.9 MILL/MM3 (ref 4.2–5.4)
SEG NEUTROPHILS: 63.3 %
SEGMENTED NEUTROPHILS ABSOLUTE COUNT: 3.2 THOU/MM3 (ref 1.8–7.7)
SODIUM BLD-SCNC: 142 MEQ/L (ref 135–145)
TOTAL IRON BINDING CAPACITY: 360 UG/DL (ref 171–450)
TOTAL PROTEIN: 7.3 G/DL (ref 6.1–8)
TRIGL SERPL-MCNC: 165 MG/DL (ref 0–199)
TSH SERPL DL<=0.05 MIU/L-ACNC: 1.15 UIU/ML (ref 0.4–4.2)
VITAMIN B-12: 337 PG/ML (ref 211–911)
VITAMIN D 25-HYDROXY: 29 NG/ML (ref 30–100)
WBC # BLD: 5.1 THOU/MM3 (ref 4.8–10.8)

## 2019-12-05 ENCOUNTER — OFFICE VISIT (OUTPATIENT)
Dept: BARIATRICS/WEIGHT MGMT | Age: 54
End: 2019-12-05
Payer: COMMERCIAL

## 2019-12-05 VITALS
WEIGHT: 288.6 LBS | TEMPERATURE: 98.2 F | HEIGHT: 64 IN | SYSTOLIC BLOOD PRESSURE: 124 MMHG | BODY MASS INDEX: 49.27 KG/M2 | HEART RATE: 78 BPM | RESPIRATION RATE: 18 BRPM | DIASTOLIC BLOOD PRESSURE: 59 MMHG

## 2019-12-05 DIAGNOSIS — K21.9 GASTROESOPHAGEAL REFLUX DISEASE, ESOPHAGITIS PRESENCE NOT SPECIFIED: ICD-10-CM

## 2019-12-05 DIAGNOSIS — E55.9 VITAMIN D DEFICIENCY: ICD-10-CM

## 2019-12-05 DIAGNOSIS — E66.01 MORBID OBESITY WITH BMI OF 45.0-49.9, ADULT (HCC): ICD-10-CM

## 2019-12-05 DIAGNOSIS — Z98.84 S/P LAPAROSCOPIC SLEEVE GASTRECTOMY: Primary | ICD-10-CM

## 2019-12-05 DIAGNOSIS — F32.A DEPRESSION, UNSPECIFIED DEPRESSION TYPE: ICD-10-CM

## 2019-12-05 LAB — VITAMIN B1 WHOLE BLOOD: 114 NMOL/L (ref 70–180)

## 2019-12-05 PROCEDURE — 99213 OFFICE O/P EST LOW 20 MIN: CPT | Performed by: PHYSICIAN ASSISTANT

## 2019-12-05 RX ORDER — HYDROCHLOROTHIAZIDE 12.5 MG/1
12.5 TABLET ORAL DAILY
COMMUNITY

## 2020-01-10 ENCOUNTER — OFFICE VISIT (OUTPATIENT)
Dept: PSYCHIATRY | Age: 55
End: 2020-01-10
Payer: COMMERCIAL

## 2020-01-10 PROCEDURE — 90791 PSYCH DIAGNOSTIC EVALUATION: CPT | Performed by: PSYCHOLOGIST

## 2020-01-12 NOTE — PROGRESS NOTES
ROUTINE PSYCHOLOGICAL EVALUATION FOR weight management psychotherapy group    Krish Del Real is a 47year-old, female with morbid obesity (BMI = 49.54), referred for a routine psychiatric evaluation for bariatric surgery. WEIGHT HISTORY  Campbell Alvarado underwent sleeve gastrectomy in November 2017 and reports she has lost 100 pounds since surgery. She reports the most she lost after surgery was 120 pounds and over the past 6 months she has gained back 20 pounds. She reports she has started to go back to old habits. She reports her primary concern is her snacking as she grazes all throughout the day in addition to emotionally eating. She reports feeling that she can never get satisfied when she eats. She is not currently exercising and has not since Thanksgiving. She does have a goal of wanting to run again, and participate in a [employer]. Prior to surgery, she reports that she lost 160 pounds but did not do this in a healthy way. PSYCHOSOCIAL HISTORY  Marital status/lives with:  and son (age 15)    Employment status (full-time/part-time, SSD, employment disability): works full time in human resource department. She does note some financial concerns over the past 6-8 months. SUPPORT SYSTEM   Supports include: when her  is feeling well he is supportive; however, this is intermittent. She also reports having a good work friend that she speaks with outside of work. MENTAL HEALTH TREATMENT HISTORY  Has seen a psychiatrist/psychologist/ in the past (summary of previous treatment): Yes as part of WLS process.    Previous medication trials include: denies  Currently in outpatient treatment (e.g. psychotherapy, medication management): denies  Current psychiatric medications include: Celexa 20 mg QD for past 3 years and is managed by family physician  Hospitalizations:  denies  Previous Suicide Attempts: denies     PSYCHIATRIC SYMPTOMS  Patient reports she is currently knowledge: above average  Insight: adequate  Judgment: average    NEUROCOGNITIVE FUNCTIONING  Patient denies a history of closed head injury, seizures, or stroke. DSM DIAGNOSTIC IMPRESSION  Adjustment disorder with depressed mood  Morbid Obesity     SUMMARY/PLANS:  Patient has gained 20 pounds over the past 6 months that is concerning to her as she is approximately 2 years s/p sleeve gastrectomy. Patient is an adequate candidate for weight management psychotherapy group. Patient is in agreement.      Psychology Clinician:  Alonso Duffy, PhD

## 2020-01-23 ENCOUNTER — OFFICE VISIT (OUTPATIENT)
Dept: PSYCHIATRY | Age: 55
End: 2020-01-23
Payer: COMMERCIAL

## 2020-01-23 PROCEDURE — 90853 GROUP PSYCHOTHERAPY: CPT | Performed by: PSYCHOLOGIST

## 2020-01-30 ENCOUNTER — OFFICE VISIT (OUTPATIENT)
Dept: PSYCHIATRY | Age: 55
End: 2020-01-30
Payer: COMMERCIAL

## 2020-01-30 PROCEDURE — 90853 GROUP PSYCHOTHERAPY: CPT | Performed by: PSYCHOLOGIST

## 2020-02-06 ENCOUNTER — OFFICE VISIT (OUTPATIENT)
Dept: PSYCHIATRY | Age: 55
End: 2020-02-06
Payer: COMMERCIAL

## 2020-02-06 PROCEDURE — 90853 GROUP PSYCHOTHERAPY: CPT | Performed by: PSYCHOLOGIST

## 2020-03-05 ENCOUNTER — OFFICE VISIT (OUTPATIENT)
Dept: PSYCHIATRY | Age: 55
End: 2020-03-05
Payer: COMMERCIAL

## 2020-03-05 PROCEDURE — 90853 GROUP PSYCHOTHERAPY: CPT | Performed by: PSYCHOLOGIST

## 2020-03-10 NOTE — PROGRESS NOTES
Weight Interventions and Strategies for a Healthy Lifestyle Group Therapy Note    Start & Stop Time of Group: 12:00-1:00pm  Total Number of Participants: 4  Theme: Self-talk    Therapist Interventions: Group process began with a review of homework given last week. Discussed how the patients chose to mindfully eat and what their experience was like. Provided psychoeducation on self-talk and how this affects mental health and eating behaviors. Taught cognitive-behavioral strategies for managing negative self-talk including thought stopping and reframing self-talk. Practiced reframing thoughts. Patient Response & Participation: (What patient did individually, said, contributed to group, etc.)  Bradford Mela reports she tried mindful eating since last session. Discussed how planning ahead to avoid temptations has been helpful for her. She was able to identify examples of rational/irrational thoughts and provide personal examples. Patient Participation (select one below)  Patient attended and participated fully     Observations/Comments: Patient was attentive to group process. She was supportive of other group members.     Diagnoses   Adjustment disorder with depressed mood  Morbid Obesity      Plan  - Complete the handout on Identifying and Changing Your Sabotaging Self-Talk  - Complete handout on My Actions to learn to give yourself credit

## 2020-03-12 ENCOUNTER — TELEPHONE (OUTPATIENT)
Dept: PSYCHIATRY | Age: 55
End: 2020-03-12

## 2020-09-14 ENCOUNTER — EMPLOYEE WELLNESS (OUTPATIENT)
Dept: OTHER | Age: 55
End: 2020-09-14

## 2020-09-14 LAB
CHOLESTEROL, TOTAL: 215 MG/DL (ref 0–199)
FASTING: YES
GLUCOSE BLD-MCNC: 99 MG/DL (ref 74–109)
HDLC SERPL-MCNC: 62 MG/DL (ref 40–90)
LDL CHOLESTEROL CALCULATED: 135 MG/DL
TRIGL SERPL-MCNC: 89 MG/DL (ref 0–199)

## 2020-10-19 VITALS — BODY MASS INDEX: 48.23 KG/M2 | WEIGHT: 281 LBS

## 2021-05-12 ENCOUNTER — HOSPITAL ENCOUNTER (OUTPATIENT)
Dept: WOMENS IMAGING | Age: 56
Discharge: HOME | End: 2021-05-12
Payer: COMMERCIAL

## 2021-05-12 DIAGNOSIS — Z12.31 VISIT FOR SCREENING MAMMOGRAM: ICD-10-CM

## 2021-05-12 PROCEDURE — 77063 BREAST TOMOSYNTHESIS BI: CPT

## 2022-08-04 LAB
CHOLESTEROL, TOTAL: 241 MG/DL (ref 0–199)
FASTING: YES
GLUCOSE BLD-MCNC: 110 MG/DL (ref 74–109)
HDLC SERPL-MCNC: 63 MG/DL (ref 40–90)
LDL CHOLESTEROL CALCULATED: 146 MG/DL
TRIGL SERPL-MCNC: 159 MG/DL (ref 0–199)

## 2023-05-22 ENCOUNTER — NURSE ONLY (OUTPATIENT)
Dept: LAB | Age: 58
End: 2023-05-22

## 2023-05-22 LAB
25(OH)D3 SERPL-MCNC: 36 NG/ML (ref 30–100)
ALBUMIN SERPL BCG-MCNC: 4.4 G/DL (ref 3.5–5.1)
ALP SERPL-CCNC: 125 U/L (ref 38–126)
ALT SERPL W/O P-5'-P-CCNC: 18 U/L (ref 11–66)
ANION GAP SERPL CALC-SCNC: 18 MEQ/L (ref 8–16)
AST SERPL-CCNC: 19 U/L (ref 5–40)
BASOPHILS ABSOLUTE: 0.1 THOU/MM3 (ref 0–0.1)
BASOPHILS NFR BLD AUTO: 0.7 %
BILIRUB SERPL-MCNC: 0.6 MG/DL (ref 0.3–1.2)
BUN SERPL-MCNC: 12 MG/DL (ref 7–22)
CALCIUM SERPL-MCNC: 9.5 MG/DL (ref 8.5–10.5)
CHLORIDE SERPL-SCNC: 99 MEQ/L (ref 98–111)
CHOLEST SERPL-MCNC: 239 MG/DL (ref 100–199)
CO2 SERPL-SCNC: 22 MEQ/L (ref 23–33)
CREAT SERPL-MCNC: 0.7 MG/DL (ref 0.4–1.2)
DEPRECATED MEAN GLUCOSE BLD GHB EST-ACNC: 105 MG/DL (ref 70–126)
DEPRECATED RDW RBC AUTO: 44.4 FL (ref 35–45)
EOSINOPHIL NFR BLD AUTO: 2 %
EOSINOPHILS ABSOLUTE: 0.2 THOU/MM3 (ref 0–0.4)
ERYTHROCYTE [DISTWIDTH] IN BLOOD BY AUTOMATED COUNT: 13.4 % (ref 11.5–14.5)
GFR SERPL CREATININE-BSD FRML MDRD: > 60 ML/MIN/1.73M2
GLUCOSE SERPL-MCNC: 108 MG/DL (ref 70–108)
HBA1C MFR BLD HPLC: 5.5 % (ref 4.4–6.4)
HCT VFR BLD AUTO: 44.2 % (ref 37–47)
HDLC SERPL-MCNC: 66 MG/DL
HGB BLD-MCNC: 14.2 GM/DL (ref 12–16)
IMM GRANULOCYTES # BLD AUTO: 0.02 THOU/MM3 (ref 0–0.07)
IMM GRANULOCYTES NFR BLD AUTO: 0.3 %
LDLC SERPL CALC-MCNC: 152 MG/DL
LYMPHOCYTES ABSOLUTE: 1.5 THOU/MM3 (ref 1–4.8)
LYMPHOCYTES NFR BLD AUTO: 20.3 %
MCH RBC QN AUTO: 29 PG (ref 26–33)
MCHC RBC AUTO-ENTMCNC: 32.1 GM/DL (ref 32.2–35.5)
MCV RBC AUTO: 90.4 FL (ref 81–99)
MONOCYTES ABSOLUTE: 0.7 THOU/MM3 (ref 0.4–1.3)
MONOCYTES NFR BLD AUTO: 8.9 %
NEUTROPHILS NFR BLD AUTO: 67.8 %
NRBC BLD AUTO-RTO: 0 /100 WBC
PLATELET # BLD AUTO: 319 THOU/MM3 (ref 130–400)
PMV BLD AUTO: 11.1 FL (ref 9.4–12.4)
POTASSIUM SERPL-SCNC: 4.3 MEQ/L (ref 3.5–5.2)
PROT SERPL-MCNC: 7.2 G/DL (ref 6.1–8)
RBC # BLD AUTO: 4.89 MILL/MM3 (ref 4.2–5.4)
SEGMENTED NEUTROPHILS ABSOLUTE COUNT: 5.2 THOU/MM3 (ref 1.8–7.7)
SODIUM SERPL-SCNC: 139 MEQ/L (ref 135–145)
T4 FREE SERPL-MCNC: 1.49 NG/DL (ref 0.93–1.76)
TRIGL SERPL-MCNC: 107 MG/DL (ref 0–199)
TSH SERPL DL<=0.005 MIU/L-ACNC: 1.08 UIU/ML (ref 0.4–4.2)
WBC # BLD AUTO: 7.6 THOU/MM3 (ref 4.8–10.8)

## 2023-09-11 LAB — FASTING: YES

## 2024-07-24 ENCOUNTER — APPOINTMENT (OUTPATIENT)
Dept: GENERAL RADIOLOGY | Age: 59
DRG: 176 | End: 2024-07-24
Payer: COMMERCIAL

## 2024-07-24 ENCOUNTER — APPOINTMENT (OUTPATIENT)
Dept: CT IMAGING | Age: 59
DRG: 176 | End: 2024-07-24
Payer: COMMERCIAL

## 2024-07-24 ENCOUNTER — HOSPITAL ENCOUNTER (EMERGENCY)
Age: 59
Discharge: HOME | DRG: 176 | End: 2024-07-24
Attending: EMERGENCY MEDICINE
Payer: COMMERCIAL

## 2024-07-24 VITALS
OXYGEN SATURATION: 95 % | TEMPERATURE: 97.7 F | SYSTOLIC BLOOD PRESSURE: 149 MMHG | HEIGHT: 64 IN | RESPIRATION RATE: 13 BRPM | HEART RATE: 100 BPM | BODY MASS INDEX: 50.02 KG/M2 | WEIGHT: 293 LBS | DIASTOLIC BLOOD PRESSURE: 90 MMHG

## 2024-07-24 DIAGNOSIS — T50.905A ADVERSE EFFECT OF DRUG, INITIAL ENCOUNTER: Primary | ICD-10-CM

## 2024-07-24 LAB
ALBUMIN SERPL BCG-MCNC: 4.1 G/DL (ref 3.5–5.1)
ALP SERPL-CCNC: 135 U/L (ref 38–126)
ALT SERPL W/O P-5'-P-CCNC: 27 U/L (ref 11–66)
ANION GAP SERPL CALC-SCNC: 11 MEQ/L (ref 8–16)
AST SERPL-CCNC: 27 U/L (ref 5–40)
BASOPHILS ABSOLUTE: 0.1 THOU/MM3 (ref 0–0.1)
BASOPHILS NFR BLD AUTO: 0.7 %
BILIRUB CONJ SERPL-MCNC: 0.2 MG/DL (ref 0.1–13.8)
BILIRUB SERPL-MCNC: 0.6 MG/DL (ref 0.3–1.2)
BUN SERPL-MCNC: 25 MG/DL (ref 7–22)
CALCIUM SERPL-MCNC: 9.2 MG/DL (ref 8.5–10.5)
CHLORIDE SERPL-SCNC: 96 MEQ/L (ref 98–111)
CO2 SERPL-SCNC: 28 MEQ/L (ref 23–33)
CREAT SERPL-MCNC: 0.7 MG/DL (ref 0.4–1.2)
D DIMER PPP IA.FEU-MCNC: ABNORMAL NG/ML FEU (ref 0–500)
DEPRECATED RDW RBC AUTO: 45.4 FL (ref 35–45)
EOSINOPHIL NFR BLD AUTO: 2.2 %
EOSINOPHILS ABSOLUTE: 0.2 THOU/MM3 (ref 0–0.4)
ERYTHROCYTE [DISTWIDTH] IN BLOOD BY AUTOMATED COUNT: 14.2 % (ref 11.5–14.5)
GFR SERPL CREATININE-BSD FRML MDRD: > 90 ML/MIN/1.73M2
GLUCOSE SERPL-MCNC: 138 MG/DL (ref 70–108)
HCT VFR BLD AUTO: 43.2 % (ref 37–47)
HGB BLD-MCNC: 14.6 GM/DL (ref 12–16)
IMM GRANULOCYTES # BLD AUTO: 0.05 THOU/MM3 (ref 0–0.07)
IMM GRANULOCYTES NFR BLD AUTO: 0.5 %
LIPASE SERPL-CCNC: 28.3 U/L (ref 5.6–51.3)
LYMPHOCYTES ABSOLUTE: 4.3 THOU/MM3 (ref 1–4.8)
LYMPHOCYTES NFR BLD AUTO: 39.1 %
MAGNESIUM SERPL-MCNC: 1.7 MG/DL (ref 1.6–2.4)
MAGNESIUM SERPL-MCNC: 1.7 MG/DL (ref 1.6–2.4)
MCH RBC QN AUTO: 29.9 PG (ref 26–33)
MCHC RBC AUTO-ENTMCNC: 33.8 GM/DL (ref 32.2–35.5)
MCV RBC AUTO: 88.3 FL (ref 81–99)
MONOCYTES ABSOLUTE: 0.9 THOU/MM3 (ref 0.4–1.3)
MONOCYTES NFR BLD AUTO: 8.2 %
NEUTROPHILS ABSOLUTE: 5.4 THOU/MM3 (ref 1.8–7.7)
NEUTROPHILS NFR BLD AUTO: 49.3 %
NRBC BLD AUTO-RTO: 0 /100 WBC
NT-PROBNP SERPL IA-MCNC: 60.9 PG/ML (ref 0–124)
OSMOLALITY SERPL CALC.SUM OF ELEC: 276.7 MOSMOL/KG (ref 275–300)
PLATELET # BLD AUTO: 225 THOU/MM3 (ref 130–400)
PMV BLD AUTO: 10 FL (ref 9.4–12.4)
POTASSIUM SERPL-SCNC: 3.2 MEQ/L (ref 3.5–5.2)
PROT SERPL-MCNC: 7.3 G/DL (ref 6.1–8)
RBC # BLD AUTO: 4.89 MILL/MM3 (ref 4.2–5.4)
SODIUM SERPL-SCNC: 135 MEQ/L (ref 135–145)
TROPONIN, HIGH SENSITIVITY: 9 NG/L (ref 0–12)
TSH SERPL DL<=0.005 MIU/L-ACNC: 1.18 UIU/ML (ref 0.4–4.2)
WBC # BLD AUTO: 10.9 THOU/MM3 (ref 4.8–10.8)

## 2024-07-24 PROCEDURE — 71275 CT ANGIOGRAPHY CHEST: CPT

## 2024-07-24 PROCEDURE — 83880 ASSAY OF NATRIURETIC PEPTIDE: CPT

## 2024-07-24 PROCEDURE — 85025 COMPLETE CBC W/AUTO DIFF WBC: CPT

## 2024-07-24 PROCEDURE — 80076 HEPATIC FUNCTION PANEL: CPT

## 2024-07-24 PROCEDURE — 99285 EMERGENCY DEPT VISIT HI MDM: CPT

## 2024-07-24 PROCEDURE — 80048 BASIC METABOLIC PNL TOTAL CA: CPT

## 2024-07-24 PROCEDURE — 93005 ELECTROCARDIOGRAM TRACING: CPT | Performed by: EMERGENCY MEDICINE

## 2024-07-24 PROCEDURE — 85379 FIBRIN DEGRADATION QUANT: CPT

## 2024-07-24 PROCEDURE — 96360 HYDRATION IV INFUSION INIT: CPT

## 2024-07-24 PROCEDURE — 71046 X-RAY EXAM CHEST 2 VIEWS: CPT

## 2024-07-24 PROCEDURE — 83690 ASSAY OF LIPASE: CPT

## 2024-07-24 PROCEDURE — 83735 ASSAY OF MAGNESIUM: CPT

## 2024-07-24 PROCEDURE — 36415 COLL VENOUS BLD VENIPUNCTURE: CPT

## 2024-07-24 PROCEDURE — 2580000003 HC RX 258: Performed by: EMERGENCY MEDICINE

## 2024-07-24 PROCEDURE — 6360000004 HC RX CONTRAST MEDICATION: Performed by: EMERGENCY MEDICINE

## 2024-07-24 PROCEDURE — 84484 ASSAY OF TROPONIN QUANT: CPT

## 2024-07-24 PROCEDURE — 84443 ASSAY THYROID STIM HORMONE: CPT

## 2024-07-24 PROCEDURE — 96361 HYDRATE IV INFUSION ADD-ON: CPT

## 2024-07-24 RX ORDER — 0.9 % SODIUM CHLORIDE 0.9 %
1000 INTRAVENOUS SOLUTION INTRAVENOUS ONCE
Status: COMPLETED | OUTPATIENT
Start: 2024-07-24 | End: 2024-07-24

## 2024-07-24 RX ADMIN — SODIUM CHLORIDE 1000 ML: 9 INJECTION, SOLUTION INTRAVENOUS at 13:15

## 2024-07-24 RX ADMIN — IOPAMIDOL 80 ML: 755 INJECTION, SOLUTION INTRAVENOUS at 14:21

## 2024-07-24 ASSESSMENT — ENCOUNTER SYMPTOMS
EYE ITCHING: 0
ABDOMINAL DISTENTION: 0
CONSTIPATION: 0
EYE PAIN: 0
WHEEZING: 0
DIARRHEA: 0
BACK PAIN: 0
STRIDOR: 0
NAUSEA: 0
VOMITING: 0
SHORTNESS OF BREATH: 1
BLOOD IN STOOL: 0
EYE REDNESS: 0
VOICE CHANGE: 0
COUGH: 0
CHEST TIGHTNESS: 0
SINUS PRESSURE: 0
EYE DISCHARGE: 0
RHINORRHEA: 0
CHOKING: 0
TROUBLE SWALLOWING: 0
PHOTOPHOBIA: 0
ABDOMINAL PAIN: 0
SORE THROAT: 0

## 2024-07-24 ASSESSMENT — PAIN - FUNCTIONAL ASSESSMENT: PAIN_FUNCTIONAL_ASSESSMENT: NONE - DENIES PAIN

## 2024-07-24 NOTE — DISCHARGE INSTRUCTIONS
Patient has tachycardia shortness of breath is most likely secondary to her medication.  Patient should keep close eye on this and monitor.  She should follow-up with primary care physician and do so within the next 1 to 2 days.  She is instructed to return to the nearest emergency room immediately for any new or worsening complaints.

## 2024-07-24 NOTE — ED TRIAGE NOTES
Pt c/o SOB and feeling her heart is racing. Pt denies having any chest pain. Pt states sx began while she was walking to the cafeteria at work. Pt reports beginning Wegovy 7/14 with 2nd shot on 7/21. Pt denies recent illness, respiratory hx or blood clots. EKG complete.   
yes

## 2024-07-24 NOTE — ED PROVIDER NOTES
[facility]  eMERGENCY dEPARTMENT eNCOUnter          CHIEF COMPLAINT       Chief Complaint   Patient presents with    Tachycardia    Shortness of Breath       Nurses Notes reviewed and I agree except as noted in the HPI.      HISTORY OF PRESENT ILLNESS    Sandra Mac is a 59 y.o. female who presents for shortness of breath heart rate increased.  Patient states that this happened when she was walking to the cafeteria.  Patient states that it lasted for short time.  Patient states that she does not really have any cardiac history.  She has not had any active chest pain.  She has not had any shortness of breath.  This happened and has decreased and is going away at this point.  Patient does state that she just started Wegovy.  Patient had her first shot on 714 2024-second shot was 7/21/2024.  Patient has been altering her diet she has only been drinking protein shakes for breakfast.  Patient is denying any abdominal pain diarrhea flatulence or constipation.  Patient denies any history of atrial fibrillation ventricular tachycardia or cardiac disease.  Patient now is currently resting comfortably on the cot no apparent distress, symptoms have abated.  No other physical complaints at this time.    REVIEW OF SYSTEMS     Review of Systems   Constitutional:  Negative for activity change, appetite change, diaphoresis, fatigue and unexpected weight change.   HENT:  Negative for congestion, ear discharge, ear pain, hearing loss, rhinorrhea, sinus pressure, sore throat, trouble swallowing and voice change.    Eyes:  Negative for photophobia, pain, discharge, redness and itching.   Respiratory:  Positive for shortness of breath. Negative for cough, choking, chest tightness, wheezing and stridor.    Cardiovascular:  Positive for palpitations. Negative for chest pain and leg swelling.   Gastrointestinal:  Negative for abdominal distention, abdominal pain, blood in stool, constipation, diarrhea, nausea and

## 2024-07-25 ENCOUNTER — HOSPITAL ENCOUNTER (INPATIENT)
Age: 59
LOS: 3 days | Discharge: HOME | DRG: 176 | End: 2024-07-28
Attending: EMERGENCY MEDICINE | Admitting: INTERNAL MEDICINE
Payer: COMMERCIAL

## 2024-07-25 ENCOUNTER — HOSPITAL ENCOUNTER (OUTPATIENT)
Age: 59
Discharge: HOME | End: 2024-07-27
Payer: COMMERCIAL

## 2024-07-25 ENCOUNTER — APPOINTMENT (OUTPATIENT)
Dept: INTERVENTIONAL RADIOLOGY/VASCULAR | Age: 59
DRG: 176 | End: 2024-07-25
Payer: COMMERCIAL

## 2024-07-25 VITALS
BODY MASS INDEX: 50.02 KG/M2 | SYSTOLIC BLOOD PRESSURE: 149 MMHG | DIASTOLIC BLOOD PRESSURE: 90 MMHG | HEIGHT: 64 IN | WEIGHT: 293 LBS

## 2024-07-25 DIAGNOSIS — R60.9 EDEMA, UNSPECIFIED TYPE: ICD-10-CM

## 2024-07-25 DIAGNOSIS — R01.1 UNDIAGNOSED CARDIAC MURMURS: ICD-10-CM

## 2024-07-25 DIAGNOSIS — R00.2 PALPITATIONS: ICD-10-CM

## 2024-07-25 DIAGNOSIS — I10 HYPERTENSION: ICD-10-CM

## 2024-07-25 DIAGNOSIS — R06.02 SHORTNESS OF BREATH: Primary | ICD-10-CM

## 2024-07-25 DIAGNOSIS — I26.94 MULTIPLE SUBSEGMENTAL PULMONARY EMBOLI WITHOUT ACUTE COR PULMONALE (HCC): ICD-10-CM

## 2024-07-25 LAB
ALBUMIN SERPL BCG-MCNC: 3.8 G/DL (ref 3.5–5.1)
ALP SERPL-CCNC: 121 U/L (ref 38–126)
ALT SERPL W/O P-5'-P-CCNC: 25 U/L (ref 11–66)
ANION GAP SERPL CALC-SCNC: 14 MEQ/L (ref 8–16)
APTT PPP: 29.7 SECONDS (ref 22–38)
AST SERPL-CCNC: 29 U/L (ref 5–40)
BASOPHILS ABSOLUTE: 0.1 THOU/MM3 (ref 0–0.1)
BASOPHILS NFR BLD AUTO: 0.6 %
BILIRUB SERPL-MCNC: 0.6 MG/DL (ref 0.3–1.2)
BUN SERPL-MCNC: 16 MG/DL (ref 7–22)
CALCIUM SERPL-MCNC: 9.2 MG/DL (ref 8.5–10.5)
CHLORIDE SERPL-SCNC: 97 MEQ/L (ref 98–111)
CO2 SERPL-SCNC: 25 MEQ/L (ref 23–33)
CREAT SERPL-MCNC: 0.7 MG/DL (ref 0.4–1.2)
DEPRECATED RDW RBC AUTO: 45.3 FL (ref 35–45)
ECHO AO ASC DIAM: 3.9 CM
ECHO AO ASCENDING AORTA INDEX: 1.66 CM/M2
ECHO AV AREA PEAK VELOCITY: 1.5 CM2
ECHO AV AREA VTI: 1.6 CM2
ECHO AV AREA/BSA PEAK VELOCITY: 0.6 CM2/M2
ECHO AV AREA/BSA VTI: 0.7 CM2/M2
ECHO AV CUSP MM: 1.3 CM
ECHO AV MEAN GRADIENT: 14 MMHG
ECHO AV MEAN VELOCITY: 1.7 M/S
ECHO AV PEAK GRADIENT: 25 MMHG
ECHO AV PEAK VELOCITY: 2.5 M/S
ECHO AV VELOCITY RATIO: 0.56
ECHO AV VTI: 47.3 CM
ECHO BSA: 2.51 M2
ECHO BSA: 2.51 M2
ECHO EST RA PRESSURE: 5 MMHG
ECHO LA AREA 2C: 20.7 CM2
ECHO LA AREA 4C: 19.2 CM2
ECHO LA DIAMETER INDEX: 1.36 CM/M2
ECHO LA DIAMETER: 3.2 CM
ECHO LA MAJOR AXIS: 5.7 CM
ECHO LA MINOR AXIS: 5.5 CM
ECHO LA VOL BP: 59 ML (ref 22–52)
ECHO LA VOL MOD A2C: 65 ML (ref 22–52)
ECHO LA VOL MOD A4C: 51 ML (ref 22–52)
ECHO LA VOL/BSA BIPLANE: 25 ML/M2 (ref 16–34)
ECHO LA VOLUME INDEX MOD A2C: 28 ML/M2 (ref 16–34)
ECHO LA VOLUME INDEX MOD A4C: 22 ML/M2 (ref 16–34)
ECHO LV E' LATERAL VELOCITY: 11 CM/S
ECHO LV E' SEPTAL VELOCITY: 7 CM/S
ECHO LV FRACTIONAL SHORTENING: 33 % (ref 28–44)
ECHO LV INTERNAL DIMENSION DIASTOLE INDEX: 1.91 CM/M2
ECHO LV INTERNAL DIMENSION DIASTOLIC: 4.5 CM (ref 3.9–5.3)
ECHO LV INTERNAL DIMENSION SYSTOLIC INDEX: 1.28 CM/M2
ECHO LV INTERNAL DIMENSION SYSTOLIC: 3 CM
ECHO LV ISOVOLUMETRIC RELAXATION TIME (IVRT): 70 MS
ECHO LV IVSD: 0.9 CM (ref 0.6–0.9)
ECHO LV MASS 2D: 132.8 G (ref 67–162)
ECHO LV MASS INDEX 2D: 56.5 G/M2 (ref 43–95)
ECHO LV POSTERIOR WALL DIASTOLIC: 0.9 CM (ref 0.6–0.9)
ECHO LV RELATIVE WALL THICKNESS RATIO: 0.4
ECHO LVOT AREA: 2.8 CM2
ECHO LVOT AV VTI INDEX: 0.55
ECHO LVOT DIAM: 1.9 CM
ECHO LVOT MEAN GRADIENT: 5 MMHG
ECHO LVOT PEAK GRADIENT: 7 MMHG
ECHO LVOT PEAK VELOCITY: 1.4 M/S
ECHO LVOT STROKE VOLUME INDEX: 31.4 ML/M2
ECHO LVOT SV: 73.7 ML
ECHO LVOT VTI: 26 CM
ECHO MV A VELOCITY: 0.87 M/S
ECHO MV E DECELERATION TIME (DT): 218 MS
ECHO MV E VELOCITY: 0.86 M/S
ECHO MV E/A RATIO: 0.99
ECHO MV E/E' LATERAL: 7.82
ECHO MV E/E' RATIO (AVERAGED): 10.05
ECHO MV E/E' SEPTAL: 12.29
ECHO PV MAX VELOCITY: 0.8 M/S
ECHO PV PEAK GRADIENT: 2 MMHG
ECHO RIGHT VENTRICULAR SYSTOLIC PRESSURE (RVSP): 56 MMHG
ECHO RV INTERNAL DIMENSION: 3.3 CM
ECHO RV TAPSE: 2.4 CM (ref 1.7–?)
ECHO TV E WAVE: 0.6 M/S
ECHO TV REGURGITANT MAX VELOCITY: 3.56 M/S
ECHO TV REGURGITANT PEAK GRADIENT: 51 MMHG
EKG ATRIAL RATE: 81 BPM
EKG ATRIAL RATE: 98 BPM
EKG P AXIS: 39 DEGREES
EKG P AXIS: 70 DEGREES
EKG P-R INTERVAL: 156 MS
EKG P-R INTERVAL: 184 MS
EKG Q-T INTERVAL: 350 MS
EKG Q-T INTERVAL: 406 MS
EKG QRS DURATION: 86 MS
EKG QRS DURATION: 96 MS
EKG QTC CALCULATION (BAZETT): 446 MS
EKG QTC CALCULATION (BAZETT): 471 MS
EKG R AXIS: 48 DEGREES
EKG R AXIS: 52 DEGREES
EKG T AXIS: 25 DEGREES
EKG T AXIS: 65 DEGREES
EKG VENTRICULAR RATE: 81 BPM
EKG VENTRICULAR RATE: 98 BPM
EOSINOPHIL NFR BLD AUTO: 0.5 %
EOSINOPHILS ABSOLUTE: 0 THOU/MM3 (ref 0–0.4)
ERYTHROCYTE [DISTWIDTH] IN BLOOD BY AUTOMATED COUNT: 14 % (ref 11.5–14.5)
GFR SERPL CREATININE-BSD FRML MDRD: > 90 ML/MIN/1.73M2
GLUCOSE SERPL-MCNC: 115 MG/DL (ref 70–108)
HCT VFR BLD AUTO: 42.5 % (ref 37–47)
HEPARIN UNFRACTIONATED: 0.06 U/ML (ref 0.3–0.7)
HEPARIN UNFRACTIONATED: 0.73 U/ML (ref 0.3–0.7)
HGB BLD-MCNC: 14.3 GM/DL (ref 12–16)
IMM GRANULOCYTES # BLD AUTO: 0.03 THOU/MM3 (ref 0–0.07)
IMM GRANULOCYTES NFR BLD AUTO: 0.3 %
INR PPP: 1.1 (ref 0.85–1.13)
LYMPHOCYTES ABSOLUTE: 2.4 THOU/MM3 (ref 1–4.8)
LYMPHOCYTES NFR BLD AUTO: 27.5 %
MAGNESIUM SERPL-MCNC: 1.9 MG/DL (ref 1.6–2.4)
MCH RBC QN AUTO: 29.9 PG (ref 26–33)
MCHC RBC AUTO-ENTMCNC: 33.6 GM/DL (ref 32.2–35.5)
MCV RBC AUTO: 88.9 FL (ref 81–99)
MONOCYTES ABSOLUTE: 0.7 THOU/MM3 (ref 0.4–1.3)
MONOCYTES NFR BLD AUTO: 8.3 %
NEUTROPHILS ABSOLUTE: 5.5 THOU/MM3 (ref 1.8–7.7)
NEUTROPHILS NFR BLD AUTO: 62.8 %
NRBC BLD AUTO-RTO: 0 /100 WBC
NT-PROBNP SERPL IA-MCNC: 558 PG/ML (ref 0–124)
OSMOLALITY SERPL CALC.SUM OF ELEC: 274.1 MOSMOL/KG (ref 275–300)
PLATELET # BLD AUTO: 235 THOU/MM3 (ref 130–400)
PMV BLD AUTO: 10 FL (ref 9.4–12.4)
POTASSIUM SERPL-SCNC: 3.8 MEQ/L (ref 3.5–5.2)
PROT SERPL-MCNC: 7.1 G/DL (ref 6.1–8)
RBC # BLD AUTO: 4.78 MILL/MM3 (ref 4.2–5.4)
SODIUM SERPL-SCNC: 136 MEQ/L (ref 135–145)
TROPONIN, HIGH SENSITIVITY: 24 NG/L (ref 0–12)
TROPONIN, HIGH SENSITIVITY: 24 NG/L (ref 0–12)
TROPONIN, HIGH SENSITIVITY: 28 NG/L (ref 0–12)
WBC # BLD AUTO: 8.8 THOU/MM3 (ref 4.8–10.8)

## 2024-07-25 PROCEDURE — 83880 ASSAY OF NATRIURETIC PEPTIDE: CPT

## 2024-07-25 PROCEDURE — 85520 HEPARIN ASSAY: CPT

## 2024-07-25 PROCEDURE — 1200000003 HC TELEMETRY R&B

## 2024-07-25 PROCEDURE — 80053 COMPREHEN METABOLIC PANEL: CPT

## 2024-07-25 PROCEDURE — 6360000002 HC RX W HCPCS: Performed by: INTERNAL MEDICINE

## 2024-07-25 PROCEDURE — 84484 ASSAY OF TROPONIN QUANT: CPT

## 2024-07-25 PROCEDURE — 99223 1ST HOSP IP/OBS HIGH 75: CPT | Performed by: INTERNAL MEDICINE

## 2024-07-25 PROCEDURE — 85610 PROTHROMBIN TIME: CPT

## 2024-07-25 PROCEDURE — 99285 EMERGENCY DEPT VISIT HI MDM: CPT

## 2024-07-25 PROCEDURE — 93010 ELECTROCARDIOGRAM REPORT: CPT | Performed by: INTERNAL MEDICINE

## 2024-07-25 PROCEDURE — 6370000000 HC RX 637 (ALT 250 FOR IP)

## 2024-07-25 PROCEDURE — 85025 COMPLETE CBC W/AUTO DIFF WBC: CPT

## 2024-07-25 PROCEDURE — 93005 ELECTROCARDIOGRAM TRACING: CPT

## 2024-07-25 PROCEDURE — 93970 EXTREMITY STUDY: CPT

## 2024-07-25 PROCEDURE — 83735 ASSAY OF MAGNESIUM: CPT

## 2024-07-25 PROCEDURE — 36415 COLL VENOUS BLD VENIPUNCTURE: CPT

## 2024-07-25 PROCEDURE — 85730 THROMBOPLASTIN TIME PARTIAL: CPT

## 2024-07-25 PROCEDURE — 93306 TTE W/DOPPLER COMPLETE: CPT

## 2024-07-25 PROCEDURE — 93306 TTE W/DOPPLER COMPLETE: CPT | Performed by: INTERNAL MEDICINE

## 2024-07-25 RX ORDER — MAGNESIUM SULFATE IN WATER 40 MG/ML
2000 INJECTION, SOLUTION INTRAVENOUS PRN
Status: DISCONTINUED | OUTPATIENT
Start: 2024-07-25 | End: 2024-07-28 | Stop reason: HOSPADM

## 2024-07-25 RX ORDER — ONDANSETRON 2 MG/ML
4 INJECTION INTRAMUSCULAR; INTRAVENOUS EVERY 6 HOURS PRN
Status: DISCONTINUED | OUTPATIENT
Start: 2024-07-25 | End: 2024-07-28 | Stop reason: HOSPADM

## 2024-07-25 RX ORDER — GLUCAGON 1 MG/ML
1 KIT INJECTION PRN
Status: DISCONTINUED | OUTPATIENT
Start: 2024-07-25 | End: 2024-07-28 | Stop reason: HOSPADM

## 2024-07-25 RX ORDER — POTASSIUM CHLORIDE 7.45 MG/ML
10 INJECTION INTRAVENOUS PRN
Status: DISCONTINUED | OUTPATIENT
Start: 2024-07-25 | End: 2024-07-28 | Stop reason: HOSPADM

## 2024-07-25 RX ORDER — SODIUM CHLORIDE 0.9 % (FLUSH) 0.9 %
5-40 SYRINGE (ML) INJECTION PRN
Status: DISCONTINUED | OUTPATIENT
Start: 2024-07-25 | End: 2024-07-28 | Stop reason: HOSPADM

## 2024-07-25 RX ORDER — SODIUM CHLORIDE 9 MG/ML
INJECTION, SOLUTION INTRAVENOUS PRN
Status: DISCONTINUED | OUTPATIENT
Start: 2024-07-25 | End: 2024-07-28 | Stop reason: HOSPADM

## 2024-07-25 RX ORDER — POTASSIUM CHLORIDE 20 MEQ/1
40 TABLET, EXTENDED RELEASE ORAL PRN
Status: DISCONTINUED | OUTPATIENT
Start: 2024-07-25 | End: 2024-07-28 | Stop reason: HOSPADM

## 2024-07-25 RX ORDER — ACETAMINOPHEN 650 MG/1
650 SUPPOSITORY RECTAL EVERY 6 HOURS PRN
Status: DISCONTINUED | OUTPATIENT
Start: 2024-07-25 | End: 2024-07-28 | Stop reason: HOSPADM

## 2024-07-25 RX ORDER — DEXTROSE MONOHYDRATE 100 MG/ML
INJECTION, SOLUTION INTRAVENOUS CONTINUOUS PRN
Status: DISCONTINUED | OUTPATIENT
Start: 2024-07-25 | End: 2024-07-28 | Stop reason: HOSPADM

## 2024-07-25 RX ORDER — SEMAGLUTIDE 0.5 MG/.5ML
0.5 INJECTION, SOLUTION SUBCUTANEOUS
COMMUNITY

## 2024-07-25 RX ORDER — ONDANSETRON 4 MG/1
4 TABLET, ORALLY DISINTEGRATING ORAL EVERY 8 HOURS PRN
Status: DISCONTINUED | OUTPATIENT
Start: 2024-07-25 | End: 2024-07-28 | Stop reason: HOSPADM

## 2024-07-25 RX ORDER — HEPARIN SODIUM 1000 [USP'U]/ML
10000 INJECTION, SOLUTION INTRAVENOUS; SUBCUTANEOUS ONCE
Status: COMPLETED | OUTPATIENT
Start: 2024-07-25 | End: 2024-07-25

## 2024-07-25 RX ORDER — HEPARIN SODIUM 10000 [USP'U]/100ML
5-30 INJECTION, SOLUTION INTRAVENOUS CONTINUOUS
Status: DISCONTINUED | OUTPATIENT
Start: 2024-07-25 | End: 2024-07-28 | Stop reason: HOSPADM

## 2024-07-25 RX ORDER — SODIUM CHLORIDE 0.9 % (FLUSH) 0.9 %
5-40 SYRINGE (ML) INJECTION EVERY 12 HOURS SCHEDULED
Status: DISCONTINUED | OUTPATIENT
Start: 2024-07-25 | End: 2024-07-28 | Stop reason: HOSPADM

## 2024-07-25 RX ORDER — ACETAMINOPHEN 325 MG/1
650 TABLET ORAL EVERY 6 HOURS PRN
Status: DISCONTINUED | OUTPATIENT
Start: 2024-07-25 | End: 2024-07-28 | Stop reason: HOSPADM

## 2024-07-25 RX ORDER — HYDROXYZINE HYDROCHLORIDE 10 MG/1
10 TABLET, FILM COATED ORAL ONCE
Status: COMPLETED | OUTPATIENT
Start: 2024-07-25 | End: 2024-07-25

## 2024-07-25 RX ORDER — HEPARIN SODIUM 1000 [USP'U]/ML
5000 INJECTION, SOLUTION INTRAVENOUS; SUBCUTANEOUS PRN
Status: DISCONTINUED | OUTPATIENT
Start: 2024-07-25 | End: 2024-07-28 | Stop reason: HOSPADM

## 2024-07-25 RX ORDER — POLYETHYLENE GLYCOL 3350 17 G/17G
17 POWDER, FOR SOLUTION ORAL DAILY PRN
Status: DISCONTINUED | OUTPATIENT
Start: 2024-07-25 | End: 2024-07-28 | Stop reason: HOSPADM

## 2024-07-25 RX ORDER — HEPARIN SODIUM 1000 [USP'U]/ML
10000 INJECTION, SOLUTION INTRAVENOUS; SUBCUTANEOUS PRN
Status: DISCONTINUED | OUTPATIENT
Start: 2024-07-25 | End: 2024-07-28 | Stop reason: HOSPADM

## 2024-07-25 RX ADMIN — HEPARIN SODIUM 15 UNITS/KG/HR: 10000 INJECTION, SOLUTION INTRAVENOUS at 16:13

## 2024-07-25 RX ADMIN — HEPARIN SODIUM 10000 UNITS: 1000 INJECTION INTRAVENOUS; SUBCUTANEOUS at 14:34

## 2024-07-25 RX ADMIN — HYDROXYZINE HYDROCHLORIDE 10 MG: 10 TABLET ORAL at 21:11

## 2024-07-25 RX ADMIN — HEPARIN SODIUM 15 UNITS/KG/HR: 10000 INJECTION, SOLUTION INTRAVENOUS at 14:34

## 2024-07-25 ASSESSMENT — PAIN - FUNCTIONAL ASSESSMENT: PAIN_FUNCTIONAL_ASSESSMENT: NONE - DENIES PAIN

## 2024-07-25 NOTE — ED PROVIDER NOTES
10,000 Units (10,000 Units IntraVENous Given 7/25/24 2394)         ED COURSE        Available laboratory and imaging results were independently reviewed and clinically correlated.    CRITICAL CARE:   None    CONSULTS:  Hospitalist, will admit    PROCEDURES:  None    Shared decision making was performed with the patient and/or present family.   Goals of care were discussed with patient and/or present family.      The results of pertinent diagnostic studies and exam findings were discussed. The patient’s provisional diagnosis and plan of care were discussed with the patient and present family. The patient and/or present family expressed understanding of the diagnosis and plan.         MEDICATION CHANGES     Current Discharge Medication List          CLINICAL IMPRESSION      1. Shortness of breath    2. Multiple subsegmental pulmonary emboli without acute cor pulmonale (HCC)          DISPOSITION/PLAN     Final diagnoses:   Shortness of breath   Multiple subsegmental pulmonary emboli without acute cor pulmonale (HCC)       Dispo:  Admit     PATIENT REFERRED TO:  No follow-up provider specified.    DISCHARGE MEDICATIONS:  Current Discharge Medication List          (Please note that portions of this note were completed with a voice recognition program.  Efforts were made to edit the dictations but occasionally words are mis-transcribed.)    Provider:  I personally performed the services described in the documentation, reviewed and edited the documentation which was dictated, and it accurately records my words and actions.    Elizabeth Mari MD 7/25/24 9:01 PM         Elizabeth Mari MD  07/25/24 [id number]

## 2024-07-25 NOTE — PROCEDURES
PROCEDURE NOTE  Date: 7/25/2024   Name: Sandra Estefania  YOB: 1965    Procedures EKG completed, notified floor that its completed

## 2024-07-25 NOTE — ED NOTES
Lab at  bedside for draw   
Presents to ER from her office after call for bilateral PEs. Pt reports sob while up since yesterday. VSS while at rest.  
Vascular to transport pt after draw   
  English      ALLERGIES     Patient has no known allergies.    SURGICAL HISTORY       Past Surgical History:   Procedure Laterality Date    ABDOMEN SURGERY  2003    RT OOPHERECTOMY    COLONOSCOPY      DILATATION, ESOPHAGUS      ENDOMETRIAL ABLATION      LEG DEBRIDEMENT  2011    RT     NE COLON CA SCRN NOT HI RSK IND Left 10/25/2017    COLONOSCOPY performed by Ranjit Noe MD at [facility]    NE EGD TRANSORAL BIOPSY SINGLE/MULTIPLE Left 10/25/2017    EGD BIOPSY performed by Ranjit Noe MD at [facility]    SLEEVE GASTRECTOMY N/A 11/20/2017    ROBOTIC SLEEVE GASTRECTOMY performed by Garrett Brown MD at [facility] OR       PAST MEDICAL HISTORY       Past Medical History:   Diagnosis Date    GERD (gastroesophageal reflux disease)     Hypertension     Infertility, female     Nausea & vomiting     PONV (postoperative nausea and vomiting)            Electronically signed by Therese Johnson RN on 7/25/2024 at 2:26 PM

## 2024-07-26 LAB
ANION GAP SERPL CALC-SCNC: 10 MEQ/L (ref 8–16)
BASOPHILS ABSOLUTE: 0.1 THOU/MM3 (ref 0–0.1)
BASOPHILS NFR BLD AUTO: 1 %
BDY SITE: ABNORMAL
BDY SITE: ABNORMAL
BUN SERPL-MCNC: 11 MG/DL (ref 7–22)
CALCIUM SERPL-MCNC: 8.7 MG/DL (ref 8.5–10.5)
CHLORIDE SERPL-SCNC: 99 MEQ/L (ref 98–111)
CO2 SERPL-SCNC: 29 MEQ/L (ref 23–33)
COLLECTED BY:: ABNORMAL
COLLECTED BY:: ABNORMAL
CREAT SERPL-MCNC: 0.7 MG/DL (ref 0.4–1.2)
DEPRECATED RDW RBC AUTO: 45.3 FL (ref 35–45)
ECHO BSA: 2.51 M2
EOSINOPHIL NFR BLD AUTO: 2.4 %
EOSINOPHILS ABSOLUTE: 0.2 THOU/MM3 (ref 0–0.4)
ERYTHROCYTE [DISTWIDTH] IN BLOOD BY AUTOMATED COUNT: 14.1 % (ref 11.5–14.5)
GFR SERPL CREATININE-BSD FRML MDRD: > 90 ML/MIN/1.73M2
GLUCOSE SERPL-MCNC: 102 MG/DL (ref 70–108)
HCT VFR BLD AUTO: 39.7 % (ref 37–47)
HEPARIN UNFRACTIONATED: 0.56 U/ML (ref 0.3–0.7)
HEPARIN UNFRACTIONATED: 0.74 U/ML (ref 0.3–0.7)
HEPARIN UNFRACTIONATED: 1.27 U/ML (ref 0.3–0.7)
HEPARIN UNFRACTIONATED: < 0.04 U/ML (ref 0.3–0.7)
HGB BLD-MCNC: 13.2 GM/DL (ref 12–16)
IMM GRANULOCYTES # BLD AUTO: 0.03 THOU/MM3 (ref 0–0.07)
IMM GRANULOCYTES NFR BLD AUTO: 0.4 %
LYMPHOCYTES ABSOLUTE: 1.9 THOU/MM3 (ref 1–4.8)
LYMPHOCYTES NFR BLD AUTO: 28 %
MCH RBC QN AUTO: 29.5 PG (ref 26–33)
MCHC RBC AUTO-ENTMCNC: 33.2 GM/DL (ref 32.2–35.5)
MCV RBC AUTO: 88.8 FL (ref 81–99)
MONOCYTES ABSOLUTE: 0.7 THOU/MM3 (ref 0.4–1.3)
MONOCYTES NFR BLD AUTO: 10.3 %
NEUTROPHILS ABSOLUTE: 3.9 THOU/MM3 (ref 1.8–7.7)
NEUTROPHILS NFR BLD AUTO: 57.9 %
NRBC BLD AUTO-RTO: 0 /100 WBC
PLATELET # BLD AUTO: 221 THOU/MM3 (ref 130–400)
PMV BLD AUTO: 10.1 FL (ref 9.4–12.4)
POTASSIUM SERPL-SCNC: 3.7 MEQ/L (ref 3.5–5.2)
RBC # BLD AUTO: 4.47 MILL/MM3 (ref 4.2–5.4)
SAO2 % BLD: 75 % (ref 94–97)
SAO2 % BLD: 93 % (ref 94–97)
SODIUM SERPL-SCNC: 138 MEQ/L (ref 135–145)
WBC # BLD AUTO: 6.8 THOU/MM3 (ref 4.8–10.8)

## 2024-07-26 PROCEDURE — 93460 R&L HRT ART/VENTRICLE ANGIO: CPT | Performed by: INTERNAL MEDICINE

## 2024-07-26 PROCEDURE — 85025 COMPLETE CBC W/AUTO DIFF WBC: CPT

## 2024-07-26 PROCEDURE — B2151ZZ FLUOROSCOPY OF LEFT HEART USING LOW OSMOLAR CONTRAST: ICD-10-PCS | Performed by: INTERNAL MEDICINE

## 2024-07-26 PROCEDURE — C1894 INTRO/SHEATH, NON-LASER: HCPCS | Performed by: INTERNAL MEDICINE

## 2024-07-26 PROCEDURE — 36415 COLL VENOUS BLD VENIPUNCTURE: CPT

## 2024-07-26 PROCEDURE — C1887 CATHETER, GUIDING: HCPCS | Performed by: INTERNAL MEDICINE

## 2024-07-26 PROCEDURE — B2111ZZ FLUOROSCOPY OF MULTIPLE CORONARY ARTERIES USING LOW OSMOLAR CONTRAST: ICD-10-PCS | Performed by: INTERNAL MEDICINE

## 2024-07-26 PROCEDURE — 85520 HEPARIN ASSAY: CPT

## 2024-07-26 PROCEDURE — 99152 MOD SED SAME PHYS/QHP 5/>YRS: CPT | Performed by: INTERNAL MEDICINE

## 2024-07-26 PROCEDURE — 80048 BASIC METABOLIC PNL TOTAL CA: CPT

## 2024-07-26 PROCEDURE — 82810 BLOOD GASES O2 SAT ONLY: CPT

## 2024-07-26 PROCEDURE — 6360000004 HC RX CONTRAST MEDICATION: Performed by: INTERNAL MEDICINE

## 2024-07-26 PROCEDURE — 99223 1ST HOSP IP/OBS HIGH 75: CPT | Performed by: INTERNAL MEDICINE

## 2024-07-26 PROCEDURE — 2709999900 HC NON-CHARGEABLE SUPPLY: Performed by: INTERNAL MEDICINE

## 2024-07-26 PROCEDURE — C1769 GUIDE WIRE: HCPCS | Performed by: INTERNAL MEDICINE

## 2024-07-26 PROCEDURE — 1200000003 HC TELEMETRY R&B

## 2024-07-26 PROCEDURE — 6360000002 HC RX W HCPCS: Performed by: INTERNAL MEDICINE

## 2024-07-26 PROCEDURE — 99233 SBSQ HOSP IP/OBS HIGH 50: CPT | Performed by: INTERNAL MEDICINE

## 2024-07-26 PROCEDURE — 99153 MOD SED SAME PHYS/QHP EA: CPT | Performed by: INTERNAL MEDICINE

## 2024-07-26 PROCEDURE — 2500000003 HC RX 250 WO HCPCS: Performed by: INTERNAL MEDICINE

## 2024-07-26 PROCEDURE — 4A023N8 MEASUREMENT OF CARDIAC SAMPLING AND PRESSURE, BILATERAL, PERCUTANEOUS APPROACH: ICD-10-PCS | Performed by: INTERNAL MEDICINE

## 2024-07-26 RX ORDER — LANOLIN ALCOHOL/MO/W.PET/CERES
3 CREAM (GRAM) TOPICAL NIGHTLY PRN
Status: DISCONTINUED | OUTPATIENT
Start: 2024-07-26 | End: 2024-07-28 | Stop reason: HOSPADM

## 2024-07-26 RX ORDER — CITALOPRAM 20 MG/1
20 TABLET ORAL DAILY
Status: DISCONTINUED | OUTPATIENT
Start: 2024-07-27 | End: 2024-07-28 | Stop reason: HOSPADM

## 2024-07-26 RX ORDER — HYDROCHLOROTHIAZIDE 25 MG/1
25 TABLET ORAL DAILY
Status: DISCONTINUED | OUTPATIENT
Start: 2024-07-27 | End: 2024-07-28 | Stop reason: HOSPADM

## 2024-07-26 RX ORDER — FENTANYL CITRATE 50 UG/ML
INJECTION, SOLUTION INTRAMUSCULAR; INTRAVENOUS PRN
Status: DISCONTINUED | OUTPATIENT
Start: 2024-07-26 | End: 2024-07-26 | Stop reason: HOSPADM

## 2024-07-26 RX ORDER — OMEPRAZOLE 20 MG/1
20 CAPSULE, DELAYED RELEASE ORAL
Status: DISCONTINUED | OUTPATIENT
Start: 2024-07-27 | End: 2024-07-26

## 2024-07-26 RX ORDER — OMEPRAZOLE 20 MG/1
20 CAPSULE, DELAYED RELEASE ORAL
Status: DISCONTINUED | OUTPATIENT
Start: 2024-07-26 | End: 2024-07-28 | Stop reason: HOSPADM

## 2024-07-26 RX ORDER — MIDAZOLAM HYDROCHLORIDE 1 MG/ML
INJECTION INTRAMUSCULAR; INTRAVENOUS PRN
Status: DISCONTINUED | OUTPATIENT
Start: 2024-07-26 | End: 2024-07-26 | Stop reason: HOSPADM

## 2024-07-26 RX ADMIN — HEPARIN SODIUM 10000 UNITS: 1000 INJECTION INTRAVENOUS; SUBCUTANEOUS at 17:44

## 2024-07-26 RX ADMIN — HEPARIN SODIUM 14 UNITS/KG/HR: 10000 INJECTION, SOLUTION INTRAVENOUS at 03:48

## 2024-07-26 RX ADMIN — OMEPRAZOLE 20 MG: 20 CAPSULE, DELAYED RELEASE ORAL at 23:28

## 2024-07-26 RX ADMIN — HEPARIN SODIUM 15 UNITS/KG/HR: 10000 INJECTION, SOLUTION INTRAVENOUS at 22:49

## 2024-07-26 RX ADMIN — HEPARIN SODIUM 18 UNITS/KG/HR: 10000 INJECTION, SOLUTION INTRAVENOUS at 17:46

## 2024-07-26 NOTE — CONSULTS
[facility]'s  Consult    Patient's Name/Date of Birth: Sandra Mac / 1965 (59 y.o.)    Date: July 26, 2024     Referring Provider: Jose Alberto Muse MD    CHIEF COMPLAINT: CTEPH due to chronic hypercoagulopathy      HPI: This is a pleasant 59 y.o. female with PMHx of HTN, GERD presents with shortness of breath.  Patient presented to the ED on 7/24/2024 with shortness of breath that occurred while she was walking towards the cafeteria, which lasted for short period of time.  Otherwise he arrived to the ED she did not have any active ongoing chest pain.  EKG on 7/24/2024 showed normal sinus rhythm at 98 bpm.  Chest x-ray showed no acute cardiopulmonary changes.  CTA chest without contrast at the time of read showed no pulmonary embolism.  Patient was discharged home on 7/24/2024.  While at home patient felt short of breath with exertion, got better with rest.  Patient was able to sleep overnight.  This morning while patient was going to work she felt short of breath while walking to the office.  Patient had an echo done outpatient, which showed's mild aortic stenosis with normal function.  Dr. Cannon contacted Dr. Muse, CTA from yesterday was reviewed read by radiology and report having small bilateral PE and subsegmental and complete occlusion filling defect in the lower lobe.     In ED VS:  /95, pulse 95. Trops 28 to 24. Pro-BNP: 558.0. EKG showed normal sinus rhythm at 81 bpm.  Vascular duplex LE showed multifocal deep and superficial venous thrombosis both leg.    Patient was seen at bedside s/p cardiac cath.  Patient denies any chest pain, chest palpitation, headache, nausea, vomiting, abdominal pain, urinary symptoms, leg pain.  Patient denies any history of previous DVTs, miscarriages, cancer.  Patient denies any recent travel.  Patient denies any family history of coagulopathy.    Echo: 7/25/2024: EF of 60 to 65%. Right atrium and right ventricle 
Meds:.heparin (porcine), heparin (porcine), sodium chloride flush, sodium chloride, potassium chloride **OR** potassium alternative oral replacement **OR** potassium chloride, magnesium sulfate, ondansetron **OR** ondansetron, polyethylene glycol, acetaminophen **OR** acetaminophen, glucose, dextrose bolus **OR** dextrose bolus, glucagon (rDNA), dextrose    No Known Allergies  Family History   Problem Relation Age of Onset    High Blood Pressure Mother     Arthritis Mother     Obesity Mother     Breast Cancer Mother 52    High Blood Pressure Father     Arthritis Father     High Blood Pressure Brother     High Blood Pressure Maternal Grandfather     High Blood Pressure Paternal Grandmother     Arthritis Paternal Grandmother      Social History     Socioeconomic History    Marital status:      Spouse name: Not on file    Number of children: Not on file    Years of education: Not on file    Highest education level: Not on file   Occupational History    Not on file   Tobacco Use    Smoking status: Never    Smokeless tobacco: Never   Substance and Sexual Activity    Alcohol use: No    Drug use: No    Sexual activity: Not on file   Other Topics Concern    Not on file   Social History Narrative    Not on file     Social Determinants of Health     Financial Resource Strain: Not on file   Food Insecurity: No Food Insecurity (7/25/2024)    Hunger Vital Sign     Worried About Running Out of Food in the Last Year: Never true     Ran Out of Food in the Last Year: Never true   Transportation Needs: No Transportation Needs (7/25/2024)    PRAPARE - Transportation     Lack of Transportation (Medical): No     Lack of Transportation (Non-Medical): No   Physical Activity: Not on file   Stress: Not on file   Social Connections: Not on file   Intimate Partner Violence: Not on file   Housing Stability: Low Risk  (7/25/2024)    Housing Stability Vital Sign     Unable to Pay for Housing in the Last Year: No     Number of Places Lived

## 2024-07-26 NOTE — H&P
History & Physical  Internal Medicine Resident         Patient: Sandra Mac 59 y.o. female      : 1965  Date of Admission: 2024  Date of Service: Pt seen/examined on 24 and Admitted to Inpatient with expected LOS greater than two midnights due to medical therapy.       ASSESSMENT AND PLAN  Acute on chronic bilateral pulmonary emboli with multiple bilateral DVTs: Patient is to presented with shortness of breath on  was sudden onset.  CTA chest performed on  was initially read as no PEs.  Patient had recurrence of shortness of breath with him to go to work on .  CTA was reevaluated and showing bilateral pulmonary emboli.  Continue heparin drip  Transition to oral anticoagulation when able  TTE  showed EF 60-65% with diastolic dysfunction.  Right ventricle moderately dilated.  Aortic valve showing stenosis with AV mean gradient 14 mmHg, AV peak gradient 25 mmHg, AV peak velocity 2.5 m/s.  Moderately dilated left atrium.  Severely dilated right atrium.  Moderately dilated ascending aorta with diameter of 3.9 cm.  Bilateral venous duplex  showed multifocal acute deep and superficial venous thrombosis of both legs.  Cardiology consulted for features consistent with possible chronic component and eval for hypercoagulability and CTEPH  Plans for possible cardiac cath on .  N.p.o. at midnight.  Elevated troponin: Likely secondary to strain due to pulmonary embolism.  Troponin trend: 28-24-24.  Patient denies any chest pain.  EKG showed normal sinus rhythm.  Monitor.  Hypertension: Home medication: HCTZ  Start HCTZ 25 mg daily  GERD: Home medication: Prilosec  Start Protonix 40 mg daily  Mood disorder: Home medication: Celexa  Start Celexa 20 mg daily      Data reviewed (unless otherwise discussed in assessment/plan)  EKG:  I have reviewed the EKG with the following interpretation: Normal sinus rhythm  Imaging: Discussed below  Labs: Reviewed, see chart and plan above. 
325 MG tablet Take 1 tablet by mouth every 6 hours as needed for Pain    Jabari Sommer MD   Calcium Citrate-Vitamin D (CALCIUM + VIT D, BARIATRIC ADVANTAGE, CHEWABLE TABLET) Take 1 tablet by mouth 3 times daily  Patient not taking: Reported on 7/25/2024    Jabari Sommer MD   omeprazole (PRILOSEC) 20 MG delayed release capsule Take 1 capsule by mouth daily    Jabari Sommer MD   citalopram (CELEXA) 20 MG tablet Take 1 tablet by mouth daily Indications: taking half a pill    Jabari Sommer MD     Additional information:       VITAL SIGNS   Vitals:    07/26/24 0330   BP: 139/80   Pulse: 89   Resp: 18   Temp:    SpO2: 90%       PHYSICAL:   General: No acute distress  HEENT:  Unremarkable for age  Neck: without increased JVD, carotid pulses 2+ bilaterally without bruits  Heart: RRR, S1 & S2 WNL, S4 gallop, without murmurs or rubs   NYHA: 2  Lungs: Clear to auscultation    Abdomen: BS present, without HSM, masses, or tenderness    Extremities: without C,C,E.  Pulses 2+ bilaterally  Mental Status: Alert & Oriented        PLANNED PROCEDURE   [x]Cath  [x]PCI                []Pacemaker/AICD  []ERIC             []Cardioversion []Peripheral angiography/PTA  [x]Other: RHC     SEDATION  Planned agent:[x]Midazolam []Meperidine [x]Sublimaze []Morphine  []Diazepam  []Other:     ASA Classification:  []1 []2 [x]3 []4 []5  Class 1: A normal healthy patient  Class 2: Pt with mild to moderate systemic disease  Class 3: Severe systemic disease or disturbance  Class 4: Severe systemic disorders that are already life threatening.  Class 5: Moribund pt with little chances of survival, for more than 24 hours.  Mallampati I Airway Classification:   []1 []2 [x]3 []4    [x]Pre-procedure diagnostic studies complete and results available.   Comment:    [x]Previous sedation/anesthesia experiences assessed.   Comment:    [x]The patient is an appropriate candidate to undergo the planned procedure sedation and anesthesia.

## 2024-07-26 NOTE — PLAN OF CARE
Problem: Skin/Tissue Integrity - Adult  Goal: Skin integrity remains intact  Outcome: Progressing  Flowsheets (Taken 7/26/2024 0156)  Skin Integrity Remains Intact: Monitor for areas of redness and/or skin breakdown  Note:   Patient repositions every 2 hours. Heels elevated . Skin kept clean and dry. Skin assessed with every head to toe. Nicho scale complete.         Problem: Respiratory - Adult  Goal: Achieves optimal ventilation and oxygenation  Outcome: Progressing  Flowsheets (Taken 7/26/2024 0156)  Achieves optimal ventilation and oxygenation:   Assess for changes in respiratory status   Assess for changes in mentation and behavior   Position to facilitate oxygenation and minimize respiratory effort   Encourage broncho-pulmonary hygiene including cough, deep breathe, incentive spirometry   Assess and instruct to report shortness of breath or any respiratory difficulty   Respiratory therapy support as indicated       Problem: Safety - Adult  Goal: Free from fall injury  Outcome: Progressing  Note: Patient remains free from fall this shift. Patient educated on safety.  Bed locked and lowest position.  2/4 side rails raised for safety. Pathway clear and possessions in reach. Call light within reach. Patient rounded on hourly.        Problem: Discharge Planning  Goal: Discharge to home or other facility with appropriate resources  Outcome: Progressing  Flowsheets (Taken 7/26/2024 0156)  Discharge to home or other facility with appropriate resources:   Identify barriers to discharge with patient and caregiver   Arrange for needed discharge resources and transportation as appropriate   Identify discharge learning needs (meds, wound care, etc)

## 2024-07-27 PROBLEM — I26.99 PULMONARY EMBOLUS (HCC): Status: ACTIVE | Noted: 2024-07-27

## 2024-07-27 LAB
ANION GAP SERPL CALC-SCNC: 13 MEQ/L (ref 8–16)
BUN SERPL-MCNC: 11 MG/DL (ref 7–22)
CALCIUM SERPL-MCNC: 8.6 MG/DL (ref 8.5–10.5)
CHLORIDE SERPL-SCNC: 98 MEQ/L (ref 98–111)
CO2 SERPL-SCNC: 25 MEQ/L (ref 23–33)
CREAT SERPL-MCNC: 0.7 MG/DL (ref 0.4–1.2)
DEPRECATED RDW RBC AUTO: 46.5 FL (ref 35–45)
ERYTHROCYTE [DISTWIDTH] IN BLOOD BY AUTOMATED COUNT: 14.4 % (ref 11.5–14.5)
GFR SERPL CREATININE-BSD FRML MDRD: > 90 ML/MIN/1.73M2
GLUCOSE SERPL-MCNC: 102 MG/DL (ref 70–108)
HCT VFR BLD AUTO: 41 % (ref 37–47)
HEPARIN UNFRACTIONATED: 0.37 U/ML (ref 0.3–0.7)
HEPARIN UNFRACTIONATED: 0.52 U/ML (ref 0.3–0.7)
HEPARIN UNFRACTIONATED: 0.55 U/ML (ref 0.3–0.7)
HEPARIN UNFRACTIONATED: 0.64 U/ML (ref 0.3–0.7)
HGB BLD-MCNC: 13.8 GM/DL (ref 12–16)
MCH RBC QN AUTO: 30.1 PG (ref 26–33)
MCHC RBC AUTO-ENTMCNC: 33.7 GM/DL (ref 32.2–35.5)
MCV RBC AUTO: 89.3 FL (ref 81–99)
PLATELET # BLD AUTO: 238 THOU/MM3 (ref 130–400)
PMV BLD AUTO: 10.2 FL (ref 9.4–12.4)
POTASSIUM SERPL-SCNC: 3.3 MEQ/L (ref 3.5–5.2)
RBC # BLD AUTO: 4.59 MILL/MM3 (ref 4.2–5.4)
SODIUM SERPL-SCNC: 136 MEQ/L (ref 135–145)
WBC # BLD AUTO: 8.6 THOU/MM3 (ref 4.8–10.8)

## 2024-07-27 PROCEDURE — 6370000000 HC RX 637 (ALT 250 FOR IP)

## 2024-07-27 PROCEDURE — 1200000003 HC TELEMETRY R&B

## 2024-07-27 PROCEDURE — 36415 COLL VENOUS BLD VENIPUNCTURE: CPT

## 2024-07-27 PROCEDURE — 81240 F2 GENE: CPT

## 2024-07-27 PROCEDURE — 85027 COMPLETE CBC AUTOMATED: CPT

## 2024-07-27 PROCEDURE — 6360000002 HC RX W HCPCS: Performed by: INTERNAL MEDICINE

## 2024-07-27 PROCEDURE — 80048 BASIC METABOLIC PNL TOTAL CA: CPT

## 2024-07-27 PROCEDURE — 99232 SBSQ HOSP IP/OBS MODERATE 35: CPT | Performed by: INTERNAL MEDICINE

## 2024-07-27 PROCEDURE — 85300 ANTITHROMBIN III ACTIVITY: CPT

## 2024-07-27 PROCEDURE — 85520 HEPARIN ASSAY: CPT

## 2024-07-27 PROCEDURE — 86147 CARDIOLIPIN ANTIBODY EA IG: CPT

## 2024-07-27 PROCEDURE — 99232 SBSQ HOSP IP/OBS MODERATE 35: CPT | Performed by: PHYSICIAN ASSISTANT

## 2024-07-27 PROCEDURE — 86146 BETA-2 GLYCOPROTEIN ANTIBODY: CPT

## 2024-07-27 PROCEDURE — 85303 CLOT INHIBIT PROT C ACTIVITY: CPT

## 2024-07-27 PROCEDURE — 85306 CLOT INHIBIT PROT S FREE: CPT

## 2024-07-27 PROCEDURE — 2580000003 HC RX 258

## 2024-07-27 RX ADMIN — HEPARIN SODIUM 15 UNITS/KG/HR: 10000 INJECTION, SOLUTION INTRAVENOUS at 11:19

## 2024-07-27 RX ADMIN — CITALOPRAM 20 MG: 20 TABLET ORAL at 08:27

## 2024-07-27 RX ADMIN — HEPARIN SODIUM 15 UNITS/KG/HR: 10000 INJECTION, SOLUTION INTRAVENOUS at 23:39

## 2024-07-27 RX ADMIN — POTASSIUM BICARBONATE 40 MEQ: 782 TABLET, EFFERVESCENT ORAL at 09:33

## 2024-07-27 RX ADMIN — HYDROCHLOROTHIAZIDE 25 MG: 25 TABLET ORAL at 08:27

## 2024-07-27 RX ADMIN — Medication 3 MG: at 00:02

## 2024-07-27 RX ADMIN — OMEPRAZOLE 20 MG: 20 CAPSULE, DELAYED RELEASE ORAL at 06:30

## 2024-07-27 RX ADMIN — SODIUM CHLORIDE, PRESERVATIVE FREE 10 ML: 5 INJECTION INTRAVENOUS at 08:27

## 2024-07-27 NOTE — DISCHARGE INSTRUCTIONS
Follow up dr escobedo 2-4  weeks  30 day event monitor     Discharge Instructions for Radial Heart Catherization    1.  Take it easy for 3-4 days.  2.  No driving for 2 days.  3.  No lifting of 5 lbs or more for 5 days with the affected arm.  4.  Can shower after 24 hours.  5.  Remove arm board after 24 hours.  6.  Apply a band aid to the insertion site daily for 5 days.  May apply antibiotic ointment if desired, but not necessary.  Wash site daily with soap and water.  7.  No creams, ointments, or powders near the insertion site.   8.  No tub baths, swimming, hot tubs, or hand washing dishes for 1 week.  9.  Watch for signs of infection (redness, warmth, swelling, or pus drainage) or coolness of extremity and call physician if this occurs  10.  If bleeding occurs from insertion site, apply pressure and call 911.

## 2024-07-27 NOTE — PLAN OF CARE
Problem: Discharge Planning  Goal: Discharge to home or other facility with appropriate resources  Outcome: Progressing     Problem: ABCDS Injury Assessment  Goal: Absence of physical injury  Outcome: Progressing     Problem: Safety - Adult  Goal: Free from fall injury  Outcome: Progressing     Problem: Respiratory - Adult  Goal: Achieves optimal ventilation and oxygenation  Outcome: Progressing     Problem: Skin/Tissue Integrity - Adult  Goal: Skin integrity remains intact  Outcome: Progressing

## 2024-07-28 ENCOUNTER — APPOINTMENT (OUTPATIENT)
Age: 59
DRG: 176 | End: 2024-07-28
Payer: COMMERCIAL

## 2024-07-28 VITALS
WEIGHT: 293 LBS | RESPIRATION RATE: 16 BRPM | HEART RATE: 73 BPM | BODY MASS INDEX: 48.82 KG/M2 | SYSTOLIC BLOOD PRESSURE: 126 MMHG | OXYGEN SATURATION: 98 % | TEMPERATURE: 97.8 F | HEIGHT: 65 IN | DIASTOLIC BLOOD PRESSURE: 73 MMHG

## 2024-07-28 LAB
ANION GAP SERPL CALC-SCNC: 12 MEQ/L (ref 8–16)
BUN SERPL-MCNC: 13 MG/DL (ref 7–22)
CALCIUM SERPL-MCNC: 8.8 MG/DL (ref 8.5–10.5)
CHLORIDE SERPL-SCNC: 101 MEQ/L (ref 98–111)
CO2 SERPL-SCNC: 24 MEQ/L (ref 23–33)
CREAT SERPL-MCNC: 0.7 MG/DL (ref 0.4–1.2)
DEPRECATED RDW RBC AUTO: 48.2 FL (ref 35–45)
ECHO BSA: 2.51 M2
ERYTHROCYTE [DISTWIDTH] IN BLOOD BY AUTOMATED COUNT: 14.3 % (ref 11.5–14.5)
GFR SERPL CREATININE-BSD FRML MDRD: > 90 ML/MIN/1.73M2
GLUCOSE SERPL-MCNC: 104 MG/DL (ref 70–108)
HCT VFR BLD AUTO: 41.3 % (ref 37–47)
HEPARIN UNFRACTIONATED: 0.57 U/ML (ref 0.3–0.7)
HGB BLD-MCNC: 13.6 GM/DL (ref 12–16)
MCH RBC QN AUTO: 30.2 PG (ref 26–33)
MCHC RBC AUTO-ENTMCNC: 32.9 GM/DL (ref 32.2–35.5)
MCV RBC AUTO: 91.8 FL (ref 81–99)
PLATELET # BLD AUTO: 228 THOU/MM3 (ref 130–400)
PMV BLD AUTO: 10.1 FL (ref 9.4–12.4)
POTASSIUM SERPL-SCNC: 4.2 MEQ/L (ref 3.5–5.2)
RBC # BLD AUTO: 4.5 MILL/MM3 (ref 4.2–5.4)
SODIUM SERPL-SCNC: 137 MEQ/L (ref 135–145)
WBC # BLD AUTO: 7.3 THOU/MM3 (ref 4.8–10.8)

## 2024-07-28 PROCEDURE — 85027 COMPLETE CBC AUTOMATED: CPT

## 2024-07-28 PROCEDURE — 93270 REMOTE 30 DAY ECG REV/REPORT: CPT

## 2024-07-28 PROCEDURE — 85520 HEPARIN ASSAY: CPT

## 2024-07-28 PROCEDURE — 94761 N-INVAS EAR/PLS OXIMETRY MLT: CPT

## 2024-07-28 PROCEDURE — 6360000002 HC RX W HCPCS: Performed by: INTERNAL MEDICINE

## 2024-07-28 PROCEDURE — 80048 BASIC METABOLIC PNL TOTAL CA: CPT

## 2024-07-28 PROCEDURE — 36415 COLL VENOUS BLD VENIPUNCTURE: CPT

## 2024-07-28 RX ADMIN — HYDROCHLOROTHIAZIDE 25 MG: 25 TABLET ORAL at 08:34

## 2024-07-28 RX ADMIN — HEPARIN SODIUM 15 UNITS/KG/HR: 10000 INJECTION, SOLUTION INTRAVENOUS at 12:27

## 2024-07-28 RX ADMIN — OMEPRAZOLE 20 MG: 20 CAPSULE, DELAYED RELEASE ORAL at 08:34

## 2024-07-28 RX ADMIN — CITALOPRAM 20 MG: 20 TABLET ORAL at 08:34

## 2024-07-28 NOTE — PLAN OF CARE
Problem: Discharge Planning  Goal: Discharge to home or other facility with appropriate resources  Outcome: Progressing     Problem: ABCDS Injury Assessment  Goal: Absence of physical injury  Outcome: Progressing     Problem: Safety - Adult  Goal: Free from fall injury  Outcome: Progressing     Problem: Respiratory - Adult  Goal: Achieves optimal ventilation and oxygenation  Outcome: Progressing     Problem: Skin/Tissue Integrity - Adult  Goal: Skin integrity remains intact  Outcome: Progressing  Skin Integrity Remains Intact: Monitor for areas of redness and/or skin breakdown

## 2024-07-28 NOTE — PROGRESS NOTES
Hospitalist Progress Note  Internal Medicine Resident      Patient: Sandra Mac 59 y.o. female      Unit/Bed: -20/020-A    Admit Date: 7/25/2024      ASSESSMENT AND PLAN  Problems  Acute on chronic bilateral pulmonary emboli with multiple acute bilateral DVTs    Patient presented with sudden onset and dyspnea on 7/24. CTA chest performed on 7/25/2024 which was originally read as no PE. Patient had recurrent dyspnea when she went to work on 7/25/2024. CTA was reevaluated and it showed bilateral pulmonary emboli.  Patient was placed on heparin gtt. Patient reported a history of superficial phlebitis roughly 20 years ago, which was thought to be 2/2 OCP. Echo 5/16/2013 EF 55 to 65%.  No wall motion abnormalities. Left mildly atrium dilated. Right ventricle upper limits of normal.  Mild tricuspid valve regurgitation mild increase in systolic pressure of the pulmonary arteries.    TTE 7/25/2024 showed EF 60-65% with diastolic dysfunction. Right ventricle moderately dilated. Aortic valve showing stenosis with AV mean gradient 14 mmHg, AV peak gradient 25 mmHg, AV peak velocity 2.5 m/s. Moderately dilated left atrium. Severely dilated right atrium.  Moderately dilated ascending aorta with diameter of 3.9 cm.    Bilateral venous duplex 7/25 showed multifocal acute deep and superficial venous thrombosis of both legs.    Cardiac cath 7/26/2024 normal pulmonary pressures ruling out pulmonary hypertension    -Evaluate patient's SpO2 7/28/2024 with exertion and at rest. If above 90, patient may be discharged. May need home O2 evaluation  -Cardiology recommended 30-day cardiac event monitor on discharge.  -Patient is to transition to [facility] on discharge  -Further hypercoagulable workup by Dr. Pineda as outpatient after discharge    #Hypokalemia. Repleted.    #Elevated troponin 2/2 pulmonary embolus  Troponin trend 28 => 24 => 24.  EKG normal.  No chest pain reported.    #Hypertension: Continue HCTZ 25 mg 
A home oxygen evaluation has been completed.     [x]Patient is an inpatient. It is expected that the patient will be discharged within the next 48 hours.  Qualified provider to write orders for possible sleep study or home oxygen prescription.Social service/care managers will arrange for home oxygen if ordered.  If patient is active, arrange for Home Medical supplier to assess for Oxygen Conserving Device per pulse oximetry.  []Patient is an outpatient. Results will be faxed to the ordering provider. Qualified provider to write orders for possible sleep study or home oxygen prescription.    Patient was placed on room air for >60 minutes. SpO2 was 96 % on room air at rest. Patients SpO2 was 89% or above and did not qualify for home oxygen. Patient was walked for 6 minutes. SpO2 was 91 % during walking. Patients SpO2 was 89% or above and did not qualify for home oxygen. Patient does not have a positive pressure airway device at home. Patient is not  diagnosed with Obstructive Sleep Apnea. Patient will not be set up/instructed on a nocturnal study.    
Discharge teaching and instructions for diagnosis/procedure of shortness of breath completed with patient using teachback method. AVS reviewed. Printed prescriptions given to patient. Patient voiced understanding regarding prescriptions, follow up appointments, and care of self at home. Patient discharged with 30 day event monitor. Discharged in a wheelchair to  home with support per family.    
she was too short of breath while walking.  Staff found her and evaluated her.  Patient was taken for outpatient echo on 7/24.  Patient was evaluated Dr. Recinos who contacted Dr. Muse for additional help/evaluation.  Radiology was contacted and CTA from 7/25 was reread by radiology.  There is noted to have bilateral PEs.  Patient was made aware of this and taken to the ED to begin heparin drip.     Medications:    Infusion Medications    heparin (PORCINE) Infusion 13.999 Units/kg/hr (07/26/24 0348)    sodium chloride      dextrose      Scheduled Medications    sodium chloride flush  5-40 mL IntraVENous 2 times per day    PRN Meds: heparin (porcine), heparin (porcine), sodium chloride flush, sodium chloride, potassium chloride **OR** potassium alternative oral replacement **OR** potassium chloride, magnesium sulfate, ondansetron **OR** ondansetron, polyethylene glycol, acetaminophen **OR** acetaminophen, glucose, dextrose bolus **OR** dextrose bolus, glucagon (rDNA), dextrose    Exam:  /64   Pulse 76   Temp 98 °F (36.7 °C) (Oral)   Resp 18   Ht 1.638 m (5' 4.5\")   Wt (!) 138.8 kg (306 lb)   SpO2 93%   BMI 51.71 kg/m²   General: No distress, appears stated age.  Eyes:  PERRL. Conjunctivae/corneas clear.  HENT: Head normal appearing. Nares normal. Oral mucosa moist.  Hearing intact.   Neck: Supple, with full range of motion. Trachea midline.  No gross JVD appreciated.  Respiratory:  Normal effort. Clear to auscultation, without rales or wheezes or rhonchi.  Cardiovascular: Normal rate, regular rhythm with normal S1/S2 with slight murmur.     Bilateral lower extremity swelling with left greater than right.  Abdomen: Soft, non-tender, non-distended with normal bowel sounds.  Musculoskeletal: No joint swelling or tenderness. Normal tone. No abnormal movements.   Skin: Warm and dry. No rashes or lesions.  Neurologic:  No focal sensory/motor deficits in the upper or lower extremities. Cranial nerves:  
hypertension noted.   PA pressure = 39/18 mmHg. PA mean = 25 mmHg. PA Sat = 75.3 %.   AO Sat = 93 %.   Murali CO = 6.94 L/min.     Coronary Diagram    Diagnostic  Dominance: Co-dominant    Intervention        Pressures Rest     Systolic (mmHg) Diastolic (mmHg) Mean (mmHg) EDP (mmHg)       67    92       PA 39    18    25       RV 47    -3     9          -4     19       144    -4     18      RA   8       PCW   9         Cardiac Ouput Rest     Cardiac Output Cardiac Index   Murali 6.94 L/min    3        Resistance Rest     PVR SVR TPVR TSVR PVR/SVR TPVR/TSVR   Resistance 182.64    966.5    287.46    1060.16   1060.16    0.19    0.27        Blood Oximetry Rest     O2 Sat (%)   PA 75.3      AO 92.5        Valve Results Rest     Area Mean Gradient (mmHg) Peak Gradient (mmHg)   AV Murali 2.14 cm2    13    21          TTE 7/25/24    Left Ventricle: Normal left ventricular systolic function with a visually estimated EF of 60 - 65%. Left ventricle size is normal. Normal wall thickness. Septal flattening in systole consistent with right ventricular pressure overload. Normal wall motion. Diastolic dysfunction present with normal LV EF.    Right Ventricle: Right ventricle is moderately dilated.    Aortic Valve: Trileaflet valve. Mildly thickened cusps. Mildly calcified cusps. Mild stenosis of the aortic valve. AV mean gradient is 14 mmHg. AV peak gradient is 25 mmHg. AV peak velocity is 2.5 m/s. AV area by continuity VTI is 1.6 cm2. AV area by peak velocity is 1.5 cm2.    Tricuspid Valve: Mild regurgitation. The estimated RVSP is 56 mmHg.    Left Atrium: Left atrium is moderately dilated.    Right Atrium: Right atrium is severely dilated.    Aorta: Normal sized aortic root. Moderately dilated ascending aorta. Ao ascending diameter is 3.9 cm.    IVC/SVC: IVC diameter is less than or equal to 21 mm and decreases greater than 50% during inspiration; therefore the estimated right atrial pressure is normal (~3 mmHg). IVC size

## 2024-07-28 NOTE — DISCHARGE SUMMARY
stated age.  Eyes:  Pupils equal, round, and reactive to light. Conjunctivae/corneas clear.  HENT: Head normal in appearance. External nares normal.  Oral mucosa moist without lesions.  Hearing grossly intact.   Neck: Supple, with full range of motion. Trachea midline.  No gross JVD appreciated.  Respiratory:  Normal respiratory effort. Clear to auscultation, bilaterally without rales or wheezes or rhonchi.  Cardiovascular: Normal rate, regular rhythm with normal S1/S2 without murmurs.  Bilateral lower extremity swelling with left greater than right.  Abdomen: Soft, non-tender, non-distended with normal bowel sounds.  Musculoskeletal: There is no joint swelling or tenderness. Normal tone. No abnormal movements.   Skin: Warm and dry. No rashes or lesions.  Neurologic:  No focal sensory/motor deficits in the upper and lower extremities. Cranial nerves:  grossly non-focal 2-12.     Psychiatric: Alert and oriented, normal insight and thought content.   Capillary Refill: Brisk,< 3 seconds.  Peripheral Pulses: +2 palpable, equal bilaterally.       Labs: For convenience the most recent labs are provided:  CBC:    Lab Results   Component Value Date/Time    WBC 7.3 07/28/2024 04:20 AM    HGB 13.6 07/28/2024 04:20 AM    HGB 13.7 11/17/2011 05:40 PM    HCT 41.3 07/28/2024 04:20 AM     07/28/2024 04:20 AM     Renal:    Lab Results   Component Value Date/Time     07/28/2024 04:20 AM    K 4.2 07/28/2024 04:20 AM    K 3.7 07/26/2024 09:14 AM     07/28/2024 04:20 AM    CO2 24 07/28/2024 04:20 AM    BUN 13 07/28/2024 04:20 AM    CREATININE 0.7 07/28/2024 04:20 AM    CALCIUM 8.8 07/28/2024 04:20 AM     Liver:   Lab Results   Component Value Date/Time    AST 29 07/25/2024 02:37 PM    ALT 25 07/25/2024 02:37 PM         Significant Diagnostic Studies    Radiology:   Vascular duplex lower extremity venous bilateral   Final Result   Multifocal acute deep and superficial venous thrombosis both legs,   described above.

## 2024-07-29 ENCOUNTER — CARE COORDINATION (OUTPATIENT)
Dept: OTHER | Facility: CLINIC | Age: 59
End: 2024-07-29

## 2024-07-29 NOTE — CARE COORDINATION
Care Transitions Note    Initial Call - Call within 2 business days of discharge: Yes    Patient Current Location:  Home: [address] [place] [id number]    Care Transition Nurse contacted the patient by telephone to perform post hospital discharge assessment, verified name and  as identifiers. Provided introduction to self, and explanation of the Care Transition Nurse role.     Patient: Sandra Mac    Patient : 1965   MRN: [id number]    Reason for Admission: Shortness of Breath, Pulmonary Embolus   Discharge Date: 24  RURS: Readmission Risk Score: 7.4      Last Discharge Facility       Date Complaint Diagnosis Description Type Department Provider    24 PE's Shortness of breath ... ED to Hosp-Admission (Discharged) (ADMITTED) [facility] Jose Alberto Muse MD; GISSELL Mari.            Was this an external facility discharge? No    Additional needs identified to be addressed with provider   No needs identified             Method of communication with provider: none.    Patients top risk factors for readmission: medical condition-recent dx of pulmonary embolus, recent heart cath on Eliquis     Interventions to address risk factors:   Review of patient management of conditions/medications: reviewed medications and things to watch for with Eliquis. Pt wearing a 30 day heart monitor. And new dx of pulmonary emboli     Care Summary Note: Pt is home today, she is breathing a lot better today she said. She is on Eliquis 10 mg po BID x 7 days then transition to 5 mg po Bid. Pt understands how to take. She is wearing a 30 day heart monitor. She had a left and right heart cath, insertion through her wrist, pt said the site looks perfectly fine, no unusual appearance. Pt is following the discharge instructions post cath. She is awaiting a return call from Cardiology to schedule her follow up appt. Pt has no needs today, said she has no idea why or how she developed the clots. Pt has no further

## 2024-07-31 LAB
AT III ACT/NOR PPP CHRO: 82 % (ref 76–128)
CARDIOLIPIN IGG SER IA-ACNC: < 10 GPL
CARDIOLIPIN IGM SER IA-ACNC: < 10 MPL

## 2024-08-01 LAB
B2 GLYCOPROT1 IGG SERPL IA-ACNC: < 10 SGU
B2 GLYCOPROT1 IGM SERPL IA-ACNC: < 10 SMU
PROT C ACT/NOR PPP: 151 % (ref 83–168)
PROT S FREE AG ACT/NOR PPP IA: NORMAL %

## 2024-08-02 LAB
F2 C.20210G>A GENO BLD/T: NEGATIVE
SPECIMEN SOURCE: NORMAL

## 2024-08-05 ENCOUNTER — OFFICE VISIT (OUTPATIENT)
Dept: CARDIOLOGY CLINIC | Age: 59
End: 2024-08-05
Payer: COMMERCIAL

## 2024-08-05 ENCOUNTER — TELEPHONE (OUTPATIENT)
Dept: CARDIOLOGY CLINIC | Age: 59
End: 2024-08-05

## 2024-08-05 VITALS
WEIGHT: 293 LBS | SYSTOLIC BLOOD PRESSURE: 111 MMHG | DIASTOLIC BLOOD PRESSURE: 72 MMHG | BODY MASS INDEX: 48.82 KG/M2 | HEART RATE: 92 BPM | HEIGHT: 65 IN

## 2024-08-05 DIAGNOSIS — D68.59 HYPERCOAGULABLE STATE (HCC): Primary | ICD-10-CM

## 2024-08-05 DIAGNOSIS — I26.99 BILATERAL PULMONARY EMBOLISM (HCC): Primary | ICD-10-CM

## 2024-08-05 PROCEDURE — 99214 OFFICE O/P EST MOD 30 MIN: CPT | Performed by: PHYSICIAN ASSISTANT

## 2024-08-05 PROCEDURE — 3074F SYST BP LT 130 MM HG: CPT | Performed by: PHYSICIAN ASSISTANT

## 2024-08-05 PROCEDURE — 3078F DIAST BP <80 MM HG: CPT | Performed by: PHYSICIAN ASSISTANT

## 2024-08-05 NOTE — PROGRESS NOTES
500 MG chewable tablet Take 1 tablet by mouth as needed for Heartburn      Multiple Vitamin (MULTI-VITAMIN PO) Take 1 capsule by mouth daily Indications: Bariatric advantage      acetaminophen (TYLENOL) 325 MG tablet Take 1 tablet by mouth every 6 hours as needed for Pain      omeprazole (PRILOSEC) 20 MG delayed release capsule Take 1 capsule by mouth daily      citalopram (CELEXA) 20 MG tablet Take 1 tablet by mouth daily Indications: taking half a pill       No current facility-administered medications for this visit.       Social History     Socioeconomic History    Marital status:    Tobacco Use    Smoking status: Never    Smokeless tobacco: Never   Substance and Sexual Activity    Alcohol use: No    Drug use: No     Social Determinants of Health     Food Insecurity: No Food Insecurity (7/25/2024)    Hunger Vital Sign     Worried About Running Out of Food in the Last Year: Never true     Ran Out of Food in the Last Year: Never true   Transportation Needs: No Transportation Needs (7/25/2024)    PRAPARE - Transportation     Lack of Transportation (Medical): No     Lack of Transportation (Non-Medical): No   Housing Stability: Low Risk  (7/25/2024)    Housing Stability Vital Sign     Unable to Pay for Housing in the Last Year: No     Number of Places Lived in the Last Year: 1     Unstable Housing in the Last Year: No       Family History   Problem Relation Age of Onset    High Blood Pressure Mother     Arthritis Mother     Obesity Mother     Breast Cancer Mother 52    High Blood Pressure Father     Arthritis Father     High Blood Pressure Brother     High Blood Pressure Maternal Grandfather     High Blood Pressure Paternal Grandmother     Arthritis Paternal Grandmother        Blood pressure 111/72, pulse 92, height 1.638 m (5' 4.5\"), weight 134.5 kg (296 lb 8 oz).    General:   Well developed, well nourished  Lungs:   Clear to auscultation  Heart:    Normal S1 S2, No murmur, rubs, or gallops  Abdomen:   Soft,

## 2024-08-05 NOTE — TELEPHONE ENCOUNTER
Pt was in to see Van today   Was in hospital and was told you would do hypercoag work up   What testing would you like ?

## 2024-08-06 ENCOUNTER — CARE COORDINATION (OUTPATIENT)
Dept: OTHER | Facility: CLINIC | Age: 59
End: 2024-08-06

## 2024-08-06 LAB — PT PCR SPECIMEN: NORMAL

## 2024-08-06 NOTE — TELEPHONE ENCOUNTER
Elisabeth can you order hypercoag panel—if abnormal refer to heme    Nursing staff—if she did not have it yet, please order V/Q scan as well

## 2024-08-06 NOTE — CARE COORDINATION
Care Transitions Note    Follow Up Call     Attempted to reach patient for transitions of care follow up.  Unable to reach patient.      Outreach Attempts:   HIPAA compliant voicemail left for patient.     Care Summary Note: Pt has new orders for VQ scan and hypercoag panel by Dr Pineda cardiologist.     Follow Up Appointment:   Future Appointments         Provider Specialty Dept Phone    11/13/2024 10:45 AM Scott Pineda MD Cardiology [phone]            Plan for follow-up call in 6-10 days based on severity of symptoms and risk factors. Plan for next call:  lab results, VQ scan, symptom review     Sara Pierson RN

## 2024-08-08 ENCOUNTER — LAB (OUTPATIENT)
Dept: LAB | Age: 59
End: 2024-08-08

## 2024-08-08 DIAGNOSIS — D68.59 HYPERCOAGULABLE STATE (HCC): ICD-10-CM

## 2024-08-08 LAB — MTHFR BY PCR SPECIMEN: NORMAL

## 2024-08-09 ENCOUNTER — CARE COORDINATION (OUTPATIENT)
Dept: OTHER | Facility: CLINIC | Age: 59
End: 2024-08-09

## 2024-08-09 NOTE — CARE COORDINATION
Care Transitions Note    Follow Up Call     Attempted to reach patient for transitions of care follow up.  Unable to reach patient.      Outreach Attempts:   HIPAA compliant voicemail left for patient.     Follow Up Appointment:   Future Appointments         Provider Specialty Dept Phone    11/13/2024 10:45 AM Scott Pineda MD Cardiology [phone]            Plan for follow-up call in 6-10 days based on severity of symptoms and risk factors. Plan for next call:  VQ scan, lab results, heart monitor    Sara Pierson RN

## 2024-08-12 LAB — AT III ACT/NOR PPP CHRO: 118 % (ref 76–128)

## 2024-08-14 ENCOUNTER — CARE COORDINATION (OUTPATIENT)
Dept: OTHER | Facility: CLINIC | Age: 59
End: 2024-08-14

## 2024-08-14 LAB
MTHFR C.1298A>C GENO BLD/T: NORMAL
MTHFR C.677C>T GENO BLD/T: NEGATIVE
MTHFR GENE MUT ANL BLD/T: NORMAL
SPECIMEN SOURCE: NORMAL

## 2024-08-14 NOTE — CARE COORDINATION
Care Transitions Note    Follow Up Call     Patient Current Location:  Home: [address] [place] [id number]    Care Transition Nurse contacted the patient by telephone. Verified name and  as identifiers.    Additional needs identified to be addressed with provider   No needs identified                 Method of communication with provider: none.    Care Summary Note: Pt returned call. She is doing pretty good, still has some shortness of breath with a lot of exertion but normally does ok, Continues on the Eliquis 5 mg po BID, she has a VQ Scan ordered on  in the morning. Some labs are still pending. Her 30 day heart monitor will come off on 24. Will follow up. She has no questions or concerns today        Advance Care Planning:   Does patient have an Advance Directive: deferred at this time, will discuss on future follow up. .    Medication Review:  Medications changed since last call, reviewed today.     Remote Patient Monitoring:  Offered patient enrollment in the Remote Patient Monitoring (RPM) program for in-home monitoring: Patient is not eligible for RPM program because: insurance coverage.    Assessments:  Care Transitions Subsequent and Final Call    Subsequent and Final Calls  Do you have any ongoing symptoms?: No  Have your medications changed?: No  Do you have any questions related to your medications?: No  Do you currently have any active services?: No  Do you have any needs or concerns that I can assist you with?: No  Identified Barriers: None  Care Transitions Interventions  Other Interventions:              Follow Up Appointment:   Reviewed appt attended.   Future Appointments         Provider Specialty Dept Phone    2024 7:30 AM (Arrive by 6:30 AM) [facility] NUCLEAR MEDICINE 3 GE INFINIA 1 Radiology [phone]    2024 10:45 AM Scott Pineda MD Cardiology [phone]            Care Transition Nurse provided contact information.  Plan for follow-up call in 6-10 days

## 2024-08-14 NOTE — CARE COORDINATION
Care Transitions Note    Follow Up Call     Attempted to reach patient for transitions of care follow up.  Unable to reach patient.      Outreach Attempts:   HIPAA compliant voicemail left for patient.   Pathflowt message sent.     Care Summary Note: VQ scan scheduled for 8./19/24 . Labs pending     Follow Up Appointment:   Future Appointments         Provider Specialty Dept Phone    8/19/2024 7:30 AM (Arrive by 6:30 AM) [facility] NUCLEAR MEDICINE 3 Social[employer] 1 Radiology [phone]    11/13/2024 10:45 AM Scott Pineda MD Cardiology [phone]            Plan for follow-up call in 6-10 days based on severity of symptoms and risk factors. Plan for next call:  outcome from VQ scan, lab review, symptom review     Sara Pierson RN

## 2024-08-19 ENCOUNTER — HOSPITAL ENCOUNTER (OUTPATIENT)
Dept: NUCLEAR MEDICINE | Age: 59
Discharge: HOME | End: 2024-08-19
Attending: INTERNAL MEDICINE
Payer: COMMERCIAL

## 2024-08-19 ENCOUNTER — HOSPITAL ENCOUNTER (OUTPATIENT)
Dept: GENERAL RADIOLOGY | Age: 59
Discharge: HOME | End: 2024-08-19
Payer: COMMERCIAL

## 2024-08-19 DIAGNOSIS — D68.59 HYPERCOAGULABLE STATE (HCC): ICD-10-CM

## 2024-08-19 PROCEDURE — 71045 X-RAY EXAM CHEST 1 VIEW: CPT

## 2024-08-19 PROCEDURE — 3430000000 HC RX DIAGNOSTIC RADIOPHARMACEUTICAL: Performed by: INTERNAL MEDICINE

## 2024-08-19 PROCEDURE — A9558 XE133 XENON 10MCI: HCPCS | Performed by: INTERNAL MEDICINE

## 2024-08-19 PROCEDURE — A9540 TC99M MAA: HCPCS | Performed by: INTERNAL MEDICINE

## 2024-08-19 PROCEDURE — 78582 LUNG VENTILAT&PERFUS IMAGING: CPT

## 2024-08-19 RX ORDER — XENON XE-133 10 MCI/1
5.7 GAS RESPIRATORY (INHALATION)
Status: COMPLETED | OUTPATIENT
Start: 2024-08-19 | End: 2024-08-19

## 2024-08-19 RX ADMIN — XENON XE-133 5.7 MILLICURIE: 10 GAS RESPIRATORY (INHALATION) at 07:32

## 2024-08-19 RX ADMIN — Medication 6 MILLICURIE: at 07:32

## 2024-08-20 NOTE — TELEPHONE ENCOUNTER
Thus far no major findings to suggest underlying issues other than BALA and weight would continue current management with oac

## 2024-08-21 ENCOUNTER — CARE COORDINATION (OUTPATIENT)
Dept: OTHER | Facility: CLINIC | Age: 59
End: 2024-08-21

## 2024-08-21 NOTE — CARE COORDINATION
.Ambulatory Care Coordination Note     2024 12:13 PM     Patient Current Location:  Home: [address] [place] [id number]     ACM contacted the patient by telephone. Verified name and  with patient as identifiers.         ACM: Sara Pierson RN     Challenges to be reviewed by the provider   Additional needs identified to be addressed with provider No  none               Method of communication with provider: none.    Care Summary Note: Pt is doing well. No shortness of breath. She continues on Eliquis 5 mg po bid. No adverse effects. VQ scan completed. She completed her lab work. She has her 30 day monitor on which is due to end on . I will follow up     Offered patient enrollment in the Remote Patient Monitoring (RPM) program for in-home monitoring: Patient is not eligible for RPM program because: insurance coverage.     Assessments Completed:   Ambulatory Care Coordination Assessment    Care Coordination Protocol  Week 1 - Initial Assessment     Do you have all of your prescriptions and are they filled?: Yes                          Suggested Interventions and Community Resources                   Medications Reviewed:   Completed during a previous call     Advance Care Planning:   Not reviewed during this call     Care Planning:    Goals Addressed                   This Visit's Progress     Conditions and Symptoms   On track     I will schedule office visits, as directed by my provider.  I will keep my appointment or reschedule if I have to cancel.  I will notify my provider of any barriers to my plan of care.  I will notify my provider of any symptoms that indicate a worsening of my condition.    Barriers: overwhelmed by complexity of regimen  Plan for overcoming my barriers: Continue to openly engage with ACM and providers to promote success in reaching goals    Confidence: 10/10  Anticipated Goal Completion Date: 24  and ongoing                 PCP/Specialist follow up:   Future

## 2024-08-27 LAB
CHOLEST SERPL-MCNC: 237 MG/DL (ref 0–199)
FASTING: YES
GLUCOSE SERPL-MCNC: 91 MG/DL (ref 74–109)
HDLC SERPL-MCNC: 55 MG/DL (ref 40–90)
LDLC SERPL CALC-MCNC: 148 MG/DL
TRIGL SERPL-MCNC: 168 MG/DL (ref 0–199)

## 2024-09-03 ENCOUNTER — CARE COORDINATION (OUTPATIENT)
Dept: OTHER | Facility: CLINIC | Age: 59
End: 2024-09-03

## 2024-09-03 NOTE — CARE COORDINATION
Ambulatory Care Coordination Note     9/3/2024 4:00 PM     ACM outreach attempt by this ACM today to perform care management follow up . ACM was unable to reach the patient by telephone today; left voice message requesting a return phone call to this ACM.     ACM: Sara Pierson RN      PCP/Specialist follow up:   Future Appointments         Provider Specialty Dept Phone    11/13/2024 10:45 AM Scott Pineda MD Cardiology [phone]            Follow Up:   Plan for next ACM outreach in approximately 1-2 days  to complete:  Symptom review, event monitor  .        Sara ALEXIS, RN- [facility]  Associate Care Manager  [phone]  [email or web address]

## 2024-09-12 LAB — ECHO BSA: 2.51 M2

## 2024-09-17 ENCOUNTER — CARE COORDINATION (OUTPATIENT)
Dept: OTHER | Facility: CLINIC | Age: 59
End: 2024-09-17

## 2024-09-17 SDOH — ECONOMIC STABILITY: TRANSPORTATION INSECURITY
IN THE PAST 12 MONTHS, HAS THE LACK OF TRANSPORTATION KEPT YOU FROM MEDICAL APPOINTMENTS OR FROM GETTING MEDICATIONS?: NO

## 2024-09-17 SDOH — SOCIAL STABILITY: SOCIAL INSECURITY: WITHIN THE LAST YEAR, HAVE YOU BEEN HUMILIATED OR EMOTIONALLY ABUSED IN OTHER WAYS BY YOUR PARTNER OR EX-PARTNER?: NO

## 2024-09-17 SDOH — SOCIAL STABILITY: SOCIAL INSECURITY: WITHIN THE LAST YEAR, HAVE YOU BEEN AFRAID OF YOUR PARTNER OR EX-PARTNER?: NO

## 2024-09-17 SDOH — ECONOMIC STABILITY: INCOME INSECURITY: HOW HARD IS IT FOR YOU TO PAY FOR THE VERY BASICS LIKE FOOD, HOUSING, MEDICAL CARE, AND HEATING?: NOT HARD AT ALL

## 2024-09-17 SDOH — ECONOMIC STABILITY: TRANSPORTATION INSECURITY
IN THE PAST 12 MONTHS, HAS LACK OF TRANSPORTATION KEPT YOU FROM MEETINGS, WORK, OR FROM GETTING THINGS NEEDED FOR DAILY LIVING?: NO

## 2024-09-17 SDOH — ECONOMIC STABILITY: FOOD INSECURITY: WITHIN THE PAST 12 MONTHS, YOU WORRIED THAT YOUR FOOD WOULD RUN OUT BEFORE YOU GOT MONEY TO BUY MORE.: NEVER TRUE

## 2024-09-17 SDOH — SOCIAL STABILITY: SOCIAL NETWORK: ARE YOU MARRIED, WIDOWED, DIVORCED, SEPARATED, NEVER MARRIED, OR LIVING WITH A PARTNER?: MARRIED

## 2024-09-17 SDOH — ECONOMIC STABILITY: INCOME INSECURITY: IN THE LAST 12 MONTHS, WAS THERE A TIME WHEN YOU WERE NOT ABLE TO PAY THE MORTGAGE OR RENT ON TIME?: NO

## 2024-09-17 SDOH — SOCIAL STABILITY: SOCIAL INSECURITY
WITHIN THE LAST YEAR, HAVE YOU BEEN KICKED, HIT, SLAPPED, OR OTHERWISE PHYSICALLY HURT BY YOUR PARTNER OR EX-PARTNER?: NO

## 2024-09-17 SDOH — ECONOMIC STABILITY: FOOD INSECURITY: WITHIN THE PAST 12 MONTHS, THE FOOD YOU BOUGHT JUST DIDN'T LAST AND YOU DIDN'T HAVE MONEY TO GET MORE.: NEVER TRUE

## 2024-09-17 SDOH — SOCIAL STABILITY: SOCIAL INSECURITY
WITHIN THE LAST YEAR, HAVE TO BEEN RAPED OR FORCED TO HAVE ANY KIND OF SEXUAL ACTIVITY BY YOUR PARTNER OR EX-PARTNER?: NO

## 2024-11-13 ENCOUNTER — OFFICE VISIT (OUTPATIENT)
Dept: CARDIOLOGY CLINIC | Age: 59
End: 2024-11-13
Payer: COMMERCIAL

## 2024-11-13 VITALS
HEART RATE: 79 BPM | SYSTOLIC BLOOD PRESSURE: 128 MMHG | BODY MASS INDEX: 48.49 KG/M2 | WEIGHT: 284 LBS | DIASTOLIC BLOOD PRESSURE: 82 MMHG | HEIGHT: 64 IN

## 2024-11-13 DIAGNOSIS — I26.99 BILATERAL PULMONARY EMBOLISM (HCC): Primary | ICD-10-CM

## 2024-11-13 DIAGNOSIS — D68.59 HYPERCOAGULABLE STATE (HCC): ICD-10-CM

## 2024-11-13 PROCEDURE — 3074F SYST BP LT 130 MM HG: CPT | Performed by: INTERNAL MEDICINE

## 2024-11-13 PROCEDURE — 3079F DIAST BP 80-89 MM HG: CPT | Performed by: INTERNAL MEDICINE

## 2024-11-13 PROCEDURE — 99213 OFFICE O/P EST LOW 20 MIN: CPT | Performed by: INTERNAL MEDICINE

## 2024-11-13 NOTE — PROGRESS NOTES
[facility] PHYSICIANS [place] SPECIALTY  [facility] CARDIOLOGY  [address]  [address] [place] [address]  Dept: [phone]  Dept Fax: [phone]  Loc: [phone]    Visit Date: 11/13/2024    Ms. Mac is a 59 y.o. female  who presented for:  Chief Complaint   Patient presents with    3 Month Follow-Up       HPI:     History of Present Illness  The patient is a 59-year-old female who presents for evaluation of pulmonary embolism.    She is currently on anticoagulant therapy and reports no instances of bleeding. Her lymphedema has shown improvement. She has been experiencing weight loss, which she attributes to the use of Wegovy. Since July 2024, she has lost 25 pounds. Her respiratory function has improved, with no reported difficulties in breathing. She has not consulted a hematologist regarding her pulmonary embolism or the use of blood thinners. She is considering an ultrasound of her legs to assess their condition.       ECG (if obtained today):  N/A      Current Outpatient Medications:     apixaban (ELIQUIS) 5 MG TABS tablet, Take 1 tablet by mouth 2 times daily, Disp: 180 tablet, Rfl: 3    Semaglutide-Weight Management (WEGOVY) 0.5 MG/0.5ML SOAJ SC injection, Inject 0.5 mg into the skin every 7 days, Disp: , Rfl:     hydroCHLOROthiazide (HYDRODIURIL) 25 MG tablet, Take 1 tablet by mouth daily, Disp: , Rfl:     calcium carbonate (TUMS) 500 MG chewable tablet, Take 1 tablet by mouth as needed for Heartburn, Disp: , Rfl:     Multiple Vitamin (MULTI-VITAMIN PO), Take 1 capsule by mouth daily Indications: Bariatric advantage, Disp: , Rfl:     acetaminophen (TYLENOL) 325 MG tablet, Take 1 tablet by mouth every 6 hours as needed for Pain, Disp: , Rfl:     omeprazole (PRILOSEC) 20 MG delayed release capsule, Take 1 capsule by mouth daily, Disp: , Rfl:     citalopram (CELEXA) 20 MG tablet, Take 1 tablet by mouth daily Indications: taking half a pill, Disp: , Rfl:     apixaban starter pack

## 2024-11-13 NOTE — PATIENT INSTRUCTIONS
Your nurses today were ESPERANZA Malone and STEPHEN Pa  Your provider today was Dr. Pineda  Phone number: [phone]

## 2024-12-04 ENCOUNTER — HOSPITAL ENCOUNTER (OUTPATIENT)
Dept: WOMENS IMAGING | Age: 59
Discharge: HOME | End: 2024-12-04
Payer: COMMERCIAL

## 2024-12-04 VITALS — WEIGHT: 270 LBS | BODY MASS INDEX: 46.35 KG/M2

## 2024-12-04 DIAGNOSIS — Z12.31 VISIT FOR SCREENING MAMMOGRAM: ICD-10-CM

## 2024-12-04 PROCEDURE — 77063 BREAST TOMOSYNTHESIS BI: CPT

## 2025-01-13 ENCOUNTER — OFFICE VISIT (OUTPATIENT)
Dept: ONCOLOGY | Age: 60
End: 2025-01-13
Payer: COMMERCIAL

## 2025-01-13 ENCOUNTER — HOSPITAL ENCOUNTER (OUTPATIENT)
Dept: INFUSION THERAPY | Age: 60
Discharge: HOME | End: 2025-01-13
Payer: COMMERCIAL

## 2025-01-13 VITALS
RESPIRATION RATE: 18 BRPM | WEIGHT: 278 LBS | OXYGEN SATURATION: 97 % | HEART RATE: 82 BPM | HEIGHT: 64 IN | SYSTOLIC BLOOD PRESSURE: 141 MMHG | BODY MASS INDEX: 47.46 KG/M2 | TEMPERATURE: 98.3 F | DIASTOLIC BLOOD PRESSURE: 74 MMHG

## 2025-01-13 VITALS
HEIGHT: 64 IN | OXYGEN SATURATION: 97 % | RESPIRATION RATE: 18 BRPM | TEMPERATURE: 98.3 F | SYSTOLIC BLOOD PRESSURE: 141 MMHG | DIASTOLIC BLOOD PRESSURE: 74 MMHG | HEART RATE: 82 BPM | BODY MASS INDEX: 47.46 KG/M2 | WEIGHT: 278 LBS

## 2025-01-13 DIAGNOSIS — Z86.718 HISTORY OF DVT (DEEP VEIN THROMBOSIS): ICD-10-CM

## 2025-01-13 DIAGNOSIS — Z86.711 HISTORY OF PULMONARY EMBOLISM: ICD-10-CM

## 2025-01-13 DIAGNOSIS — Z86.718 HISTORY OF DVT (DEEP VEIN THROMBOSIS): Primary | ICD-10-CM

## 2025-01-13 PROCEDURE — 99204 OFFICE O/P NEW MOD 45 MIN: CPT | Performed by: PHYSICIAN ASSISTANT

## 2025-01-13 PROCEDURE — 99211 OFF/OP EST MAY X REQ PHY/QHP: CPT

## 2025-01-13 PROCEDURE — 85520 HEPARIN ASSAY: CPT

## 2025-01-13 PROCEDURE — 85610 PROTHROMBIN TIME: CPT

## 2025-01-13 PROCEDURE — 81241 F5 GENE: CPT

## 2025-01-13 PROCEDURE — 36415 COLL VENOUS BLD VENIPUNCTURE: CPT

## 2025-01-13 PROCEDURE — 85730 THROMBOPLASTIN TIME PARTIAL: CPT

## 2025-01-13 PROCEDURE — 3078F DIAST BP <80 MM HG: CPT | Performed by: PHYSICIAN ASSISTANT

## 2025-01-13 PROCEDURE — 3077F SYST BP >= 140 MM HG: CPT | Performed by: PHYSICIAN ASSISTANT

## 2025-01-13 PROCEDURE — 85613 RUSSELL VIPER VENOM DILUTED: CPT

## 2025-01-13 NOTE — PATIENT INSTRUCTIONS
Will obtain lupus anticoagulant and factor V leiden today  Return to clinic in 3 weeks to review lab work  Please call for questions or concerns.

## 2025-01-13 NOTE — PROGRESS NOTES
[facility] [address], [address]  [facility] [address]  Dept: [phone]  Dept Fax: [phone] Loc: [phone]      Visit Date:1/13/2025     Sandra Mac is a 59 y.o. female who presents today for:   Chief Complaint   Patient presents with    New Patient     BILATERAL PE, HYPERCOAGULABLE        HPI:   Sandra Mac is a 59 y.o. female referred to Hematology/Oncology clinic for evaluation of bilateral PE per Cardiology, Dr. Pineda.  The patient presented to the ED on 7/24/2024 with shortness of breath, GAN.  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  Initially CTA of the chest showed no pulmonary embolism, no acute intrathoracic findings.  No mediastinal, hilar or axillary lymphadenopathy noted.  The following day on 7/25/2024 the patient had worsening GAN, shortness of breath, generalized poor feeling.  CTA of the chest was further reviewed which showed small bilateral subsegmental incompletely occlusive filling defects in the lower lobe pulmonary arteries.  Bilateral venous Doppler of the lower extremities was completed on 7/25/2024 showing multifocal acute deep and superficial venous thrombosis both legs.  She was admitted and started on IV heparin.  She was discharged on Eliquis.  The patient was referred to hematology for recommendations for anticoagulation duration.  The patient states she was diagnosed with superficial thrombophlebitis of the lower extremity approximately 20 years ago.  She was taking oral contraceptives at that time.  She was treated with Coumadin for 6 months.  She denies other episodes of VTE, MI or CVA.  Prior to presentation in July 2024, the patient denies provoking factors.  She denies prolonged travel or immobility.  Denies surgical interventions, trauma or hormone replacement therapy.  She is a non-smoker.  She states she tolerates Eliquis well.  Denies epistaxis, hemoptysis, hematemesis, melena,

## 2025-01-16 LAB
CONFIRM DRVVT STA-STACLOT: ABNORMAL S
DRVVT SCREEN TO CONFIRM RATIO: ABNORMAL {RATIO}
DRVVT/DRVVT CFM P DOAC NEUT NORM PPP-RTO: 1.11 {RATIO}
HEPARIN NT PPP QL: ABNORMAL
LA 3 SCREEN W REFLEX-IMP: ABNORMAL
LMW HEPARIN IND PLT AB SER QL: PRESENT
MIXING DRVVT/NORMAL: ABNORMAL %
PROTHROMBIN TIME: 15.3 S (ref 12–15.5)
SCREEN APTT/NORMAL: 1.03
SCREEN APTT/NORMAL: ABNORMAL
SCREEN DRVVT/NORMAL: 1.63 %
THROMBIN TIME: ABNORMAL S

## 2025-01-17 LAB — F5 P.R506Q BLD/T QL: NEGATIVE

## 2025-02-10 ENCOUNTER — OFFICE VISIT (OUTPATIENT)
Dept: ONCOLOGY | Age: 60
End: 2025-02-10
Payer: COMMERCIAL

## 2025-02-10 ENCOUNTER — HOSPITAL ENCOUNTER (OUTPATIENT)
Dept: INFUSION THERAPY | Age: 60
Discharge: HOME | End: 2025-02-10
Payer: COMMERCIAL

## 2025-02-10 VITALS
OXYGEN SATURATION: 95 % | DIASTOLIC BLOOD PRESSURE: 66 MMHG | SYSTOLIC BLOOD PRESSURE: 141 MMHG | TEMPERATURE: 98 F | RESPIRATION RATE: 16 BRPM | HEART RATE: 80 BPM

## 2025-02-10 VITALS
TEMPERATURE: 98 F | SYSTOLIC BLOOD PRESSURE: 141 MMHG | RESPIRATION RATE: 16 BRPM | DIASTOLIC BLOOD PRESSURE: 66 MMHG | OXYGEN SATURATION: 95 % | BODY MASS INDEX: 47.65 KG/M2 | WEIGHT: 277.4 LBS | HEART RATE: 80 BPM

## 2025-02-10 DIAGNOSIS — Z86.711 HISTORY OF PULMONARY EMBOLISM: ICD-10-CM

## 2025-02-10 DIAGNOSIS — Z86.718 HISTORY OF DVT (DEEP VEIN THROMBOSIS): Primary | ICD-10-CM

## 2025-02-10 DIAGNOSIS — I89.0 LYMPHEDEMA: ICD-10-CM

## 2025-02-10 PROCEDURE — 3077F SYST BP >= 140 MM HG: CPT | Performed by: PHYSICIAN ASSISTANT

## 2025-02-10 PROCEDURE — 99211 OFF/OP EST MAY X REQ PHY/QHP: CPT

## 2025-02-10 PROCEDURE — 3078F DIAST BP <80 MM HG: CPT | Performed by: PHYSICIAN ASSISTANT

## 2025-02-10 PROCEDURE — 99214 OFFICE O/P EST MOD 30 MIN: CPT | Performed by: PHYSICIAN ASSISTANT

## 2025-02-10 NOTE — PATIENT INSTRUCTIONS
Will refer to PT for lymphedema evaluation  Return to clinic in 6 months   Please call for questions or concerns.

## 2025-02-10 NOTE — PROGRESS NOTES
[facility] [address], [address]  [facility] [address]  Dept: [phone]  Dept Fax: [phone] Loc: [phone]      Visit Date:2/10/2025     Sandra Mac is a 59 y.o. female who presents today for:   Chief Complaint   Patient presents with    Follow-up     History of DVT (deep vein thrombosis)        HPI:   Sandra Mac is a 59 y.o. female referred to Hematology/Oncology clinic for evaluation of bilateral PE per Cardiology, Dr. Pineda. She was seen as a new patient on 1/13/2025. The patient presented to the ED on 7/24/2024 with shortness of breath, GAN.  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  Initially CTA of the chest showed no pulmonary embolism, no acute intrathoracic findings.  No mediastinal, hilar or axillary lymphadenopathy noted.  The following day on 7/25/2024 the patient had worsening GAN, shortness of breath, generalized poor feeling.  CTA of the chest was further reviewed which showed small bilateral subsegmental incompletely occlusive filling defects in the lower lobe pulmonary arteries.  Bilateral venous Doppler of the lower extremities was completed on 7/25/2024 showing multifocal acute deep and superficial venous thrombosis both legs.  She was admitted and started on IV heparin.  She was discharged on Eliquis.  The patient was referred to hematology for recommendations for anticoagulation duration.  The patient states she was diagnosed with superficial thrombophlebitis of the lower extremity approximately 20 years ago.  She was taking oral contraceptives at that time.  She was treated with Coumadin for 6 months.  She denies other episodes of VTE, MI or CVA.  Prior to presentation in July 2024, the patient denies provoking factors.  She denies prolonged travel or immobility.  Denies surgical interventions, trauma or hormone replacement therapy.  She is a non-smoker.  She states she tolerates Eliquis well.

## 2025-03-12 ENCOUNTER — HOSPITAL ENCOUNTER (OUTPATIENT)
Dept: PHYSICAL THERAPY | Age: 60
Setting detail: THERAPIES SERIES
Discharge: HOME | End: 2025-03-12
Payer: COMMERCIAL

## 2025-03-12 PROCEDURE — 97162 PT EVAL MOD COMPLEX 30 MIN: CPT

## 2025-03-12 NOTE — PROGRESS NOTES
[facility]  PHYSICAL THERAPY  [x] LYMPHEDEMA SERVICES EVALUATION  [] DAILY NOTE [] PROGRESS NOTE [] DISCHARGE NOTE    [x] OUTPATIENT REHABILITATION CENTER [facility]   [] [place] AMBULATORY CARE [place]    [] [facility]   [] [facility]    Date: 3/12/2025  Patient Name:  Sandra Mac  : 1965  MRN: [id number]  CSN: [id number]    Referring Practitioner Traci Marroquin PA-C [id number]      Diagnosis Personal history of other venous thrombosis and embolism   Treatment Diagnosis I89.0 Lymphedema, not elsewhere classified  R26.2 Difficulty in walking   Date of Evaluation 3/12/25   Additional Pertinent History Sandra Mac has a past medical history of Deep vein thrombosis (HCC), GERD (gastroesophageal reflux disease), Hypertension, Infertility, female, Nausea & vomiting, PONV (postoperative nausea and vomiting), and Pulmonary embolism (HCC).  she has a past surgical history that includes Abdomen surgery (); Leg Debridement (); pr colon ca scrn not hi rsk ind (Left, 10/25/2017); pr egd transoral biopsy single/multiple (Left, 10/25/2017); Colonoscopy; Dilatation, esophagus; Endometrial ablation; Sleeve Gastrectomy (N/A, 2017); and Cardiac procedure (N/A, 2024).     Allergies No Known Allergies   Medications   Current Outpatient Medications:     apixaban (ELIQUIS) 5 MG TABS tablet, Take 1 tablet by mouth 2 times daily, Disp: 180 tablet, Rfl: 3    Semaglutide-Weight Management (WEGOVY) 0.5 MG/0.5ML SOAJ SC injection, Inject 0.5 mg into the skin every 7 days, Disp: , Rfl:     hydroCHLOROthiazide (HYDRODIURIL) 25 MG tablet, Take 1 tablet by mouth daily, Disp: , Rfl:     calcium carbonate (TUMS) 500 MG chewable tablet, Take 1 tablet by mouth as needed for Heartburn, Disp: , Rfl:     Multiple Vitamin (MULTI-VITAMIN PO), Take 1 capsule by mouth daily Indications: Bariatric advantage, Disp: , Rfl:     acetaminophen (TYLENOL) 325 MG tablet, Take 1

## 2025-03-17 ENCOUNTER — HOSPITAL ENCOUNTER (OUTPATIENT)
Dept: PHYSICAL THERAPY | Age: 60
Setting detail: THERAPIES SERIES
Discharge: HOME | End: 2025-03-17
Payer: COMMERCIAL

## 2025-03-17 PROCEDURE — 97140 MANUAL THERAPY 1/> REGIONS: CPT

## 2025-03-17 PROCEDURE — 97535 SELF CARE MNGMENT TRAINING: CPT

## 2025-03-17 NOTE — PROGRESS NOTES
[facility]  PHYSICAL THERAPY  [] LYMPHEDEMA SERVICES EVALUATION  [x] DAILY NOTE [] PROGRESS NOTE [] DISCHARGE NOTE    [x] OUTPATIENT REHABILITATION CENTER [facility]   [] [place] AMBULATORY CARE [place]    [] [facility]   [] [facility]    Date: 3/17/2025  Patient Name:  Sandra Mac  : 1965  MRN: [id number]  CSN: [id number]    Referring Practitioner Traci Marroquin PA-C [id number]      Diagnosis Personal history of other venous thrombosis and embolism   Treatment Diagnosis I89.0 Lymphedema, not elsewhere classified  R26.2 Difficulty in walking   Date of Evaluation 3/12/25   Additional Pertinent History Sandra Mac has a past medical history of Deep vein thrombosis (HCC), GERD (gastroesophageal reflux disease), Hypertension, Infertility, female, Nausea & vomiting, PONV (postoperative nausea and vomiting), and Pulmonary embolism (HCC).  she has a past surgical history that includes Abdomen surgery (); Leg Debridement (); pr colon ca scrn not hi rsk ind (Left, 10/25/2017); pr egd transoral biopsy single/multiple (Left, 10/25/2017); Colonoscopy; Dilatation, esophagus; Endometrial ablation; Sleeve Gastrectomy (N/A, 2017); and Cardiac procedure (N/A, 2024).     Allergies No Known Allergies   Medications   Current Outpatient Medications:     apixaban (ELIQUIS) 5 MG TABS tablet, Take 1 tablet by mouth 2 times daily, Disp: 180 tablet, Rfl: 3    Semaglutide-Weight Management (WEGOVY) 0.5 MG/0.5ML SOAJ SC injection, Inject 0.5 mg into the skin every 7 days, Disp: , Rfl:     hydroCHLOROthiazide (HYDRODIURIL) 25 MG tablet, Take 1 tablet by mouth daily, Disp: , Rfl:     calcium carbonate (TUMS) 500 MG chewable tablet, Take 1 tablet by mouth as needed for Heartburn, Disp: , Rfl:     Multiple Vitamin (MULTI-VITAMIN PO), Take 1 capsule by mouth daily Indications: Bariatric advantage, Disp: , Rfl:     acetaminophen (TYLENOL) 325 MG tablet, Take 1

## 2025-03-21 ENCOUNTER — HOSPITAL ENCOUNTER (OUTPATIENT)
Dept: PHYSICAL THERAPY | Age: 60
Setting detail: THERAPIES SERIES
Discharge: HOME | End: 2025-03-21
Payer: COMMERCIAL

## 2025-03-21 PROCEDURE — 97535 SELF CARE MNGMENT TRAINING: CPT

## 2025-03-21 PROCEDURE — 97140 MANUAL THERAPY 1/> REGIONS: CPT

## 2025-03-21 NOTE — PROGRESS NOTES
with no greater than moderate assist and verbal cues from the therapist working towards being modified independent with applying the compression bandages for night time swelling.    Long Term Goals:  Time Frame: 12 weeks  * Lymphedema swelling will stabilize as noted by total circumferential changes being no greater than 3.0-5.0 cm in preparation for being measured for new compression garment(s) to be worn daily to keep swelling down.  * Patient/family/caregiver will demonstrate donning/doffing compression garments with modified independence to wear compression garments daily to keep swelling down.  * Patient/family/caregiver will verbalize a good understanding regarding proper wearing and replacement schedule, precautions and care of garment(s).  *Improve LLIS to 24 or less to assist with longterm survivorship with lymphedema.    PLAN:  Treatment Recommendations:Manual Therapy/Manual Lymph Drainage, Skin Care/Education, Compression Bandaging, Garment Fitting/Assessment, Strengthening , Range of Motion, Endurance Training, Functional Mobility Training, Safety Training, Patient Education, and Home Exercise Program    []  Plan of care initiated.  Plan to see patient 2-3 times per week for up to 12 weeks to address the treatment planned outlined above.  Treatment may be decreased as appropriate according to patient need.    [x]  Referral for Sequential Compression Pump-discussed this date.    [x]  Continue with current plan of care  []  Modify plan of care as follows:    []  Hold pending physician visit  []  Discharge    Time In 0800   Time Out 0853   Timed Code Minutes: 53 min   Total Treatment Time: 53 min     Electronically Signed by: Leti Celestin PTA on 3/21/2025

## 2025-03-25 ENCOUNTER — HOSPITAL ENCOUNTER (OUTPATIENT)
Dept: PHYSICAL THERAPY | Age: 60
Setting detail: THERAPIES SERIES
Discharge: HOME | End: 2025-03-25
Payer: COMMERCIAL

## 2025-03-25 PROCEDURE — 97140 MANUAL THERAPY 1/> REGIONS: CPT

## 2025-03-25 PROCEDURE — 97535 SELF CARE MNGMENT TRAINING: CPT

## 2025-03-25 NOTE — PROGRESS NOTES
[facility]  PHYSICAL THERAPY  [] LYMPHEDEMA SERVICES EVALUATION  [x] DAILY NOTE [] PROGRESS NOTE [] DISCHARGE NOTE    [x] OUTPATIENT REHABILITATION CENTER [facility]   [] [place] AMBULATORY CARE [place]    [] [facility]   [] [facility]    Date: 3/25/2025  Patient Name:  Sandra Mac  : 1965  MRN: [id number]  CSN: [id number]    Referring Practitioner Traci Marroquin PA-C [id number]      Diagnosis Personal history of other venous thrombosis and embolism   Treatment Diagnosis I89.0 Lymphedema, not elsewhere classified  R26.2 Difficulty in walking   Date of Evaluation 3/12/25   Additional Pertinent History Sandra Mac has a past medical history of Deep vein thrombosis (HCC), GERD (gastroesophageal reflux disease), Hypertension, Infertility, female, Nausea & vomiting, PONV (postoperative nausea and vomiting), and Pulmonary embolism (HCC).  she has a past surgical history that includes Abdomen surgery (); Leg Debridement (); pr colon ca scrn not hi rsk ind (Left, 10/25/2017); pr egd transoral biopsy single/multiple (Left, 10/25/2017); Colonoscopy; Dilatation, esophagus; Endometrial ablation; Sleeve Gastrectomy (N/A, 2017); and Cardiac procedure (N/A, 2024).     Allergies No Known Allergies   Medications   Current Outpatient Medications:     apixaban (ELIQUIS) 5 MG TABS tablet, Take 1 tablet by mouth 2 times daily, Disp: 180 tablet, Rfl: 3    Semaglutide-Weight Management (WEGOVY) 0.5 MG/0.5ML SOAJ SC injection, Inject 0.5 mg into the skin every 7 days, Disp: , Rfl:     hydroCHLOROthiazide (HYDRODIURIL) 25 MG tablet, Take 1 tablet by mouth daily, Disp: , Rfl:     calcium carbonate (TUMS) 500 MG chewable tablet, Take 1 tablet by mouth as needed for Heartburn, Disp: , Rfl:     Multiple Vitamin (MULTI-VITAMIN PO), Take 1 capsule by mouth daily Indications: Bariatric advantage, Disp: , Rfl:     acetaminophen (TYLENOL) 325 MG tablet, Take 1

## 2025-03-27 ENCOUNTER — HOSPITAL ENCOUNTER (OUTPATIENT)
Dept: PHYSICAL THERAPY | Age: 60
Setting detail: THERAPIES SERIES
Discharge: HOME | End: 2025-03-27
Payer: COMMERCIAL

## 2025-03-27 PROCEDURE — 97140 MANUAL THERAPY 1/> REGIONS: CPT

## 2025-04-01 ENCOUNTER — HOSPITAL ENCOUNTER (OUTPATIENT)
Dept: PHYSICAL THERAPY | Age: 60
Setting detail: THERAPIES SERIES
Discharge: HOME | End: 2025-04-01
Payer: COMMERCIAL

## 2025-04-01 PROCEDURE — 97140 MANUAL THERAPY 1/> REGIONS: CPT

## 2025-04-01 PROCEDURE — 97535 SELF CARE MNGMENT TRAINING: CPT

## 2025-04-01 NOTE — PROGRESS NOTES
[facility]  PHYSICAL THERAPY  [] LYMPHEDEMA SERVICES EVALUATION  [x] DAILY NOTE [] PROGRESS NOTE [] DISCHARGE NOTE    [x] OUTPATIENT REHABILITATION CENTER [facility]   [] [place] AMBULATORY CARE [place]    [] [facility]   [] [facility]    Date: 2025  Patient Name:  Sandra Mac  : 1965  MRN: [id number]  CSN: [id number]    Referring Practitioner Traci Marroquin PA-C [id number]      Diagnosis Personal history of other venous thrombosis and embolism   Treatment Diagnosis I89.0 Lymphedema, not elsewhere classified  R26.2 Difficulty in walking   Date of Evaluation 3/12/25   Additional Pertinent History Sandra Mac has a past medical history of Deep vein thrombosis (HCC), GERD (gastroesophageal reflux disease), Hypertension, Infertility, female, Nausea & vomiting, PONV (postoperative nausea and vomiting), and Pulmonary embolism (HCC).  she has a past surgical history that includes Abdomen surgery (); Leg Debridement (); pr colon ca scrn not hi rsk ind (Left, 10/25/2017); pr egd transoral biopsy single/multiple (Left, 10/25/2017); Colonoscopy; Dilatation, esophagus; Endometrial ablation; Sleeve Gastrectomy (N/A, 2017); and Cardiac procedure (N/A, 2024).     Allergies No Known Allergies   Medications   Current Outpatient Medications:     apixaban (ELIQUIS) 5 MG TABS tablet, Take 1 tablet by mouth 2 times daily, Disp: 180 tablet, Rfl: 3    Semaglutide-Weight Management (WEGOVY) 0.5 MG/0.5ML SOAJ SC injection, Inject 0.5 mg into the skin every 7 days, Disp: , Rfl:     hydroCHLOROthiazide (HYDRODIURIL) 25 MG tablet, Take 1 tablet by mouth daily, Disp: , Rfl:     calcium carbonate (TUMS) 500 MG chewable tablet, Take 1 tablet by mouth as needed for Heartburn, Disp: , Rfl:     Multiple Vitamin (MULTI-VITAMIN PO), Take 1 capsule by mouth daily Indications: Bariatric advantage, Disp: , Rfl:     acetaminophen (TYLENOL) 325 MG tablet, Take 1 tablet

## 2025-04-04 ENCOUNTER — HOSPITAL ENCOUNTER (OUTPATIENT)
Dept: PHYSICAL THERAPY | Age: 60
Setting detail: THERAPIES SERIES
Discharge: HOME | End: 2025-04-04
Payer: COMMERCIAL

## 2025-04-04 PROCEDURE — 97140 MANUAL THERAPY 1/> REGIONS: CPT

## 2025-04-04 NOTE — PROGRESS NOTES
[facility]  PHYSICAL THERAPY  [] LYMPHEDEMA SERVICES EVALUATION  [x] DAILY NOTE [] PROGRESS NOTE [] DISCHARGE NOTE    [x] OUTPATIENT REHABILITATION CENTER [facility]   [] [place] AMBULATORY CARE [place]    [] [facility]   [] [facility]    Date: 2025  Patient Name:  Sandra Mac  : 1965  MRN: [id number]  CSN: [id number]    Referring Practitioner Traci Marroquin PA-C [id number]      Diagnosis Personal history of other venous thrombosis and embolism   Treatment Diagnosis I89.0 Lymphedema, not elsewhere classified  R26.2 Difficulty in walking   Date of Evaluation 3/12/25   Additional Pertinent History Sandra Mac has a past medical history of Deep vein thrombosis (HCC), GERD (gastroesophageal reflux disease), Hypertension, Infertility, female, Nausea & vomiting, PONV (postoperative nausea and vomiting), and Pulmonary embolism (HCC).  she has a past surgical history that includes Abdomen surgery (); Leg Debridement (); pr colon ca scrn not hi rsk ind (Left, 10/25/2017); pr egd transoral biopsy single/multiple (Left, 10/25/2017); Colonoscopy; Dilatation, esophagus; Endometrial ablation; Sleeve Gastrectomy (N/A, 2017); and Cardiac procedure (N/A, 2024).     Allergies No Known Allergies   Medications   Current Outpatient Medications:     apixaban (ELIQUIS) 5 MG TABS tablet, Take 1 tablet by mouth 2 times daily, Disp: 180 tablet, Rfl: 3    Semaglutide-Weight Management (WEGOVY) 0.5 MG/0.5ML SOAJ SC injection, Inject 0.5 mg into the skin every 7 days, Disp: , Rfl:     hydroCHLOROthiazide (HYDRODIURIL) 25 MG tablet, Take 1 tablet by mouth daily, Disp: , Rfl:     calcium carbonate (TUMS) 500 MG chewable tablet, Take 1 tablet by mouth as needed for Heartburn, Disp: , Rfl:     Multiple Vitamin (MULTI-VITAMIN PO), Take 1 capsule by mouth daily Indications: Bariatric advantage, Disp: , Rfl:     acetaminophen (TYLENOL) 325 MG tablet, Take 1 tablet

## 2025-04-07 ENCOUNTER — HOSPITAL ENCOUNTER (OUTPATIENT)
Dept: PHYSICAL THERAPY | Age: 60
Setting detail: THERAPIES SERIES
Discharge: HOME | End: 2025-04-07
Payer: COMMERCIAL

## 2025-04-07 PROCEDURE — 97535 SELF CARE MNGMENT TRAINING: CPT

## 2025-04-07 PROCEDURE — 97140 MANUAL THERAPY 1/> REGIONS: CPT

## 2025-04-07 NOTE — PROGRESS NOTES
no greater than 3.0-5.0 cm in preparation for being measured for new compression garment(s) to be worn daily to keep swelling down.  * Patient/family/caregiver will demonstrate donning/doffing compression garments with modified independence to wear compression garments daily to keep swelling down.  * Patient/family/caregiver will verbalize a good understanding regarding proper wearing and replacement schedule, precautions and care of garment(s).  *Improve LLIS to 24 or less to assist with longterm survivorship with lymphedema.    PLAN:  Treatment Recommendations:Manual Therapy/Manual Lymph Drainage, Skin Care/Education, Compression Bandaging, Garment Fitting/Assessment, Strengthening , Range of Motion, Endurance Training, Functional Mobility Training, Safety Training, Patient Education, and Home Exercise Program    []  Plan of care initiated.  Plan to see patient 2-3 times per week for up to 12 weeks to address the treatment planned outlined above.  Treatment may be decreased as appropriate according to patient need.    []  Referral for Sequential Compression Pump-discussed this date.    [x]  Continue with current plan of care  []  Modify plan of care as follows:    []  Hold pending physician visit  []  Discharge    Time In 0702   Time Out 0756   Timed Code Minutes: 54 min   Total Treatment Time: 54 min     Electronically Signed by: Leti Ruby PT on 4/7/2025

## 2025-04-10 ENCOUNTER — HOSPITAL ENCOUNTER (OUTPATIENT)
Dept: PHYSICAL THERAPY | Age: 60
Setting detail: THERAPIES SERIES
End: 2025-04-10
Payer: COMMERCIAL

## 2025-04-14 ENCOUNTER — HOSPITAL ENCOUNTER (OUTPATIENT)
Dept: PHYSICAL THERAPY | Age: 60
Setting detail: THERAPIES SERIES
Discharge: HOME | End: 2025-04-14
Payer: COMMERCIAL

## 2025-04-14 PROCEDURE — 97140 MANUAL THERAPY 1/> REGIONS: CPT

## 2025-04-14 PROCEDURE — 97535 SELF CARE MNGMENT TRAINING: CPT

## 2025-04-14 PROCEDURE — 97110 THERAPEUTIC EXERCISES: CPT

## 2025-04-14 NOTE — PROGRESS NOTES
22.3 cm 21.5 cm   Ankle 28 cm 27.8 cm   Largest Part of Calf 53.4 cm 52 cm   Just Below Knee 51.6 cm 52.5 cm   Just Above Knee 55.3 cm 54 cm   Thigh at Groin 84.7 cm 76.4 cm      25: L=295.3, R=284.2cm  3/21/25: L=298.4, R=287.3cm    Current Treatment  Conservative Lymphedema Treatment    Precautions/Restrictions: Standard/Universal Precautions   Pain: (0-10 scale)  0=no pain, 10=worst/severe pain) Pain Ratin  Pain Location:  Generally has heaviness in legs.    Description:Heaviness    “X” in shaded column indicates activity completed today   Intervention Treatment  Notes   Skin Care Applied Eucerin moisturizer to skin, Instructed patient on skin care precautions., and Instructed patient on steps to prevent edema and infection.  Low pH moisturizer    Compression Bandaging Location: B LE, Compression Gradient: Minimal (20-30 mmHg), Supplies (per involved extremity): Medigrip stokinette (size F)  Could try size E on R LE next session   Compression Pump      Kinesiotaping/Elastic Taping   LOCATION # 1 B LE    # OF SQUARES 5   ANCHOR (Proximal) Medial/lateral knee   ENDS (Distal) Extends distally   TAPE COLOR Beige   AMT OF STRETCH  ([place]/Ends) 0%   AMT OF STRETCH  (Tails) 15 %   NUMBER OF TAILS 4   PURPOSE -Decongestion  -Pain Reduction  -Fibrotic Breakdown     X Educated on kinesiotape precautions and to remove if has any irritation         Manual Therapy Time/Technique  Notes   Manual Lymph Drainage (MLD) Bilateral Lower Extremity Manual Lymph Drainage (MLD):     Effleurage, Profundus, Terminus, Axillary Lnn, and (IA) Anterior Inguinal to Axillary (3 steps)  Thigh (Anterior, Posterior, Lateral, Medial to Lateral), Knee (Anterior, Posterior, Lateral, Medial), Lower Leg (Anterior, Posterior), Ankle (Anterior, Posterior, Lateral, Medial), and Foot/Toes (Anterior, Posterior)  Lower Extremity (toes to groin), Axillary Lnn, (IA) Anterior Inguinal to Axillary, Terminus, Profundus, and Effleurage  X B LE

## 2025-04-17 ENCOUNTER — HOSPITAL ENCOUNTER (OUTPATIENT)
Dept: PHYSICAL THERAPY | Age: 60
Setting detail: THERAPIES SERIES
Discharge: HOME | End: 2025-04-17
Payer: COMMERCIAL

## 2025-04-17 PROCEDURE — 97140 MANUAL THERAPY 1/> REGIONS: CPT

## 2025-04-17 NOTE — PROGRESS NOTES
will demonstrate and verbalize 3-5 skin care precautionary measures to decrease dry skin and risk of infection.  MET: See LTG.  Patient/family/caregiver will demonstrate applying compression bandages with no greater than moderate assist and verbal cues from the therapist working towards being modified independent with applying the compression bandages for night time swelling.  MET: Patient is able to independently don/doff medigrip compression socks.  See LTG.    Long Term Goals:  Time Frame: 8 weeks  * Lymphedema swelling will stabilize as noted by total circumferential changes being no greater than 3.0-5.0 cm in preparation for being measured for new compression garment(s) to be worn daily to keep swelling down.  * Patient/family/caregiver will demonstrate donning/doffing compression garments with modified independence to wear compression garments daily to keep swelling down.  * Patient/family/caregiver will verbalize a good understanding regarding proper wearing and replacement schedule, precautions and care of garment(s).  *Improve LLIS to 24 or less to assist with longterm survivorship with lymphedema.    PLAN:  Treatment Recommendations:Manual Therapy/Manual Lymph Drainage, Skin Care/Education, Compression Bandaging, Garment Fitting/Assessment, Strengthening , Range of Motion, Endurance Training, Functional Mobility Training, Safety Training, Patient Education, and Home Exercise Program    []  Plan of care initiated.  Plan to see patient 2-3 times per week for up to 12 weeks to address the treatment planned outlined above.  Treatment may be decreased as appropriate according to patient need.    []  Referral for Sequential Compression Pump-discussed this date.    []  Continue with current plan of care  [x]  Modify plan of care as follows:  2-3 times per week for up to 8 weeks  []  Hold pending physician visit  []  Discharge    Time In 0700   Time Out 0750   Timed Code Minutes: 50 min   Total Treatment Time: 50

## 2025-04-21 ENCOUNTER — HOSPITAL ENCOUNTER (OUTPATIENT)
Dept: PHYSICAL THERAPY | Age: 60
Setting detail: THERAPIES SERIES
Discharge: HOME | End: 2025-04-21
Payer: COMMERCIAL

## 2025-04-21 PROCEDURE — 97535 SELF CARE MNGMENT TRAINING: CPT

## 2025-04-21 PROCEDURE — 97140 MANUAL THERAPY 1/> REGIONS: CPT

## 2025-04-21 NOTE — PROGRESS NOTES
[facility]  PHYSICAL THERAPY  [] LYMPHEDEMA SERVICES EVALUATION  [x] DAILY NOTE [] PROGRESS NOTE [] DISCHARGE NOTE    [x] OUTPATIENT REHABILITATION CENTER [facility]   [] [place] [facility] [place]    [] [facility]   [] [facility]    Date: 2025  Patient Name:  Sandra Mca  : 1965  MRN: [id number]  CSN: [id number]    Referring Practitioner Traci Marroquin PA-C [id number]      Diagnosis Personal history of other venous thrombosis and embolism   Treatment Diagnosis I89.0 Lymphedema, not elsewhere classified  R26.2 Difficulty in walking   Date of Evaluation 3/12/25   Additional Pertinent History Sandra Mac has a past medical history of Deep vein thrombosis (HCC), GERD (gastroesophageal reflux disease), Hypertension, Infertility, female, Nausea & vomiting, PONV (postoperative nausea and vomiting), and Pulmonary embolism (HCC).  she has a past surgical history that includes Abdomen surgery (); Leg Debridement (); pr colon ca scrn not hi rsk ind (Left, 10/25/2017); pr egd transoral biopsy single/multiple (Left, 10/25/2017); Colonoscopy; Dilatation, esophagus; Endometrial ablation; Sleeve Gastrectomy (N/A, 2017); and Cardiac procedure (N/A, 2024).     Allergies No Known Allergies   Medications   Current Outpatient Medications:     apixaban (ELIQUIS) 5 MG TABS tablet, Take 1 tablet by mouth 2 times daily, Disp: 180 tablet, Rfl: 3    Semaglutide-Weight Management (WEGOVY) 0.5 MG/0.5ML SOAJ SC injection, Inject 0.5 mg into the skin every 7 days, Disp: , Rfl:     hydroCHLOROthiazide (HYDRODIURIL) 25 MG tablet, Take 1 tablet by mouth daily, Disp: , Rfl:     calcium carbonate (TUMS) 500 MG chewable tablet, Take 1 tablet by mouth as needed for Heartburn, Disp: , Rfl:     Multiple Vitamin (MULTI-VITAMIN PO), Take 1 capsule by mouth daily Indications: Bariatric advantage, Disp: , Rfl:     acetaminophen (TYLENOL) 325 MG tablet, Take 1

## 2025-04-24 ENCOUNTER — HOSPITAL ENCOUNTER (OUTPATIENT)
Dept: PHYSICAL THERAPY | Age: 60
Setting detail: THERAPIES SERIES
Discharge: HOME | End: 2025-04-24
Payer: COMMERCIAL

## 2025-04-24 PROCEDURE — 97140 MANUAL THERAPY 1/> REGIONS: CPT

## 2025-04-24 NOTE — PROGRESS NOTES
precautionary measures to decrease dry skin and risk of infection.  MET: See LTG.  Patient/family/caregiver will demonstrate applying compression bandages with no greater than moderate assist and verbal cues from the therapist working towards being modified independent with applying the compression bandages for night time swelling.  MET: Patient is able to independently don/doff medigrip compression socks.  See LTG.    Long Term Goals:  Time Frame: 8 weeks  * Lymphedema swelling will stabilize as noted by total circumferential changes being no greater than 3.0-5.0 cm in preparation for being measured for new compression garment(s) to be worn daily to keep swelling down.  * Patient/family/caregiver will demonstrate donning/doffing compression garments with modified independence to wear compression garments daily to keep swelling down.  * Patient/family/caregiver will verbalize a good understanding regarding proper wearing and replacement schedule, precautions and care of garment(s).  *Improve LLIS to 24 or less to assist with longterm survivorship with lymphedema.    PLAN:  Treatment Recommendations:Manual Therapy/Manual Lymph Drainage, Skin Care/Education, Compression Bandaging, Garment Fitting/Assessment, Strengthening , Range of Motion, Endurance Training, Functional Mobility Training, Safety Training, Patient Education, and Home Exercise Program    []  Plan of care initiated.  Plan to see patient 2-3 times per week for up to 12 weeks to address the treatment planned outlined above.  Treatment may be decreased as appropriate according to patient need.    []  Referral for Sequential Compression Pump-discussed this date.    []  Continue with current plan of care  [x]  Modify plan of care as follows:  2-3 times per week for up to 8 weeks  []  Hold pending physician visit  []  Discharge    Time In 0704   Time Out 0800   Timed Code Minutes: 54 min   Total Treatment Time: 54 min     Electronically Signed by: Leti DONOHUE

## 2025-04-28 ENCOUNTER — HOSPITAL ENCOUNTER (OUTPATIENT)
Dept: PHYSICAL THERAPY | Age: 60
Setting detail: THERAPIES SERIES
Discharge: HOME | End: 2025-04-28
Payer: COMMERCIAL

## 2025-04-28 PROCEDURE — 97140 MANUAL THERAPY 1/> REGIONS: CPT

## 2025-04-28 PROCEDURE — 97535 SELF CARE MNGMENT TRAINING: CPT

## 2025-04-28 NOTE — PROGRESS NOTES
60,40, 20 Hz  -SECONDS: 2 s  -PERCENTAGE: 60 %  -LOCATION: L distal LE  -GOAL: Decongestion/Pain Reduction/Fibrotic tissue breakdown  -PATIENT TOLERANCE: Patient denied pain during/following treatment session.   -softening of tissue noted post physiotouch             Decongestive Therapy Exercises (DTE) Exercise Reps Sets Hold Done   Ankle pumps 10 1  X   SAQ  10 1 3s X                                X Instructed to complete a set of 10 ankle pumps every hour            Yes/No Date completed Therapist initials Date Item received   Pump referral x 3/27/25 ES    Garments ordered       Garment specifics      Specific Interventions Next Treatment: Manual lymphatic drainage B LE, compression, skin care, lymphatouch, kinesiotape, there ex    Activity Tolerance:  [x]  Patient tolerated activity well  []  Patient limited by fatigue  []  Patient limited by pain   []  Patient limited by medical complications  []  Other:     Patient Education:   [x]  HEP/Education Completed: Wearing kinesiotape, compression pump follow up  [employer] Access Code:  []  No new Education completed  [x]  Reviewed Prior HEP      [x]  Patient verbalized and/or demonstrated understanding of education provided.  []  Patient unable to verbalize and/or demonstrate understanding of education provided.  Will continue education.  [x]  Barriers to learning: None per patient    Assessment: Legs are looking better today.  Edema is soft and moving well.  Responding well to treatment.    GOALS:  Patient Goal: \"Decrease size of legs\"    Short Term Goals:  Time Frame: 4 weeks   Patient will demonstrate a decrease in circumferential measurements of the affected extremity by 2 cm working towards the lymphedema swelling stabilizing and patient being able to get measured and fitted for a new compression garment to be worn daily to keep swelling down.  MET: See LTG  Patient/family/caregiver will demonstrate and verbalize 3-5 skin care precautionary measures to decrease

## 2025-05-05 ENCOUNTER — HOSPITAL ENCOUNTER (OUTPATIENT)
Dept: PHYSICAL THERAPY | Age: 60
Setting detail: THERAPIES SERIES
Discharge: HOME | End: 2025-05-05
Payer: COMMERCIAL

## 2025-05-05 PROCEDURE — 97140 MANUAL THERAPY 1/> REGIONS: CPT

## 2025-05-05 PROCEDURE — 97110 THERAPEUTIC EXERCISES: CPT

## 2025-05-05 PROCEDURE — 97535 SELF CARE MNGMENT TRAINING: CPT

## 2025-05-05 NOTE — PROGRESS NOTES
* PLEASE SIGN, DATE AND TIME CERTIFICATION BELOW AND RETURN TO [facility] OUTPATIENT REHABILITATION (FAX #: [phone]).  ATTEST/CO-SIGN IF ACCESSING VIA [employer].  THANK YOU.**    I certify that I have examined the patient below and determined that Physical Medicine and Rehabilitation service is necessary and that I approve the established plan of care for up to 90 days or as specifically noted.  Attestation, signature or co-signature of physician indicates approval of certification requirements.    ________________________ ____________  Physician Signature   Date      [facility]  PHYSICAL THERAPY  [] LYMPHEDEMA SERVICES EVALUATION  [] DAILY NOTE [x] PROGRESS NOTE [] DISCHARGE NOTE    [x] OUTPATIENT REHABILITATION [facility]   [] [place] [facility] [place]    [] [facility]   [] [facility]    Date: 2025  Patient Name:  Sandra Mac  : 1965  MRN: [id number]  CSN: [id number]    Referring Practitioner Traci Marroquin PA-C [id number]      Diagnosis Personal history of other venous thrombosis and embolism   Treatment Diagnosis I89.0 Lymphedema, not elsewhere classified  R26.2 Difficulty in walking   Date of Evaluation 3/12/25   Additional Pertinent History Sandra Mac has a past medical history of Deep vein thrombosis (HCC), GERD (gastroesophageal reflux disease), Hypertension, Infertility, female, Nausea & vomiting, PONV (postoperative nausea and vomiting), and Pulmonary embolism (HCC).  she has a past surgical history that includes Abdomen surgery (); Leg Debridement (); pr colon ca scrn not hi rsk ind (Left, 10/25/2017); pr egd transoral biopsy single/multiple (Left, 10/25/2017); Colonoscopy; Dilatation, esophagus; Endometrial ablation; Sleeve Gastrectomy (N/A, 2017); and Cardiac procedure (N/A, 2024).     Allergies No Known Allergies   Medications   Current Outpatient Medications:     apixaban (ELIQUIS) 5 MG TABS

## 2025-05-14 ENCOUNTER — HOSPITAL ENCOUNTER (OUTPATIENT)
Dept: PHYSICAL THERAPY | Age: 60
Setting detail: THERAPIES SERIES
Discharge: HOME | End: 2025-05-14
Payer: COMMERCIAL

## 2025-05-14 PROCEDURE — 97140 MANUAL THERAPY 1/> REGIONS: CPT

## 2025-05-14 NOTE — PROGRESS NOTES
[facility]  PHYSICAL THERAPY  [] LYMPHEDEMA SERVICES EVALUATION  [x] DAILY NOTE [] PROGRESS NOTE [] DISCHARGE NOTE    [x] OUTPATIENT REHABILITATION CENTER [facility]   [] [place] [facility] [place]    [] [facility]   [] [facility]    Date: 2025  Patient Name:  Sandra Mac  : 1965  MRN: [id number]  CSN: [id number]    Referring Practitioner Traci Marroquin PA-C [id number]      Diagnosis Personal history of other venous thrombosis and embolism   Treatment Diagnosis I89.0 Lymphedema, not elsewhere classified  R26.2 Difficulty in walking   Date of Evaluation 3/12/25   Additional Pertinent History Sandra Mac has a past medical history of Deep vein thrombosis (HCC), GERD (gastroesophageal reflux disease), Hypertension, Infertility, female, Nausea & vomiting, PONV (postoperative nausea and vomiting), and Pulmonary embolism (HCC).  she has a past surgical history that includes Abdomen surgery (); Leg Debridement (); pr colon ca scrn not hi rsk ind (Left, 10/25/2017); pr egd transoral biopsy single/multiple (Left, 10/25/2017); Colonoscopy; Dilatation, esophagus; Endometrial ablation; Sleeve Gastrectomy (N/A, 2017); and Cardiac procedure (N/A, 2024).     Allergies No Known Allergies   Medications   Current Outpatient Medications:     apixaban (ELIQUIS) 5 MG TABS tablet, Take 1 tablet by mouth 2 times daily, Disp: 180 tablet, Rfl: 3    Semaglutide-Weight Management (WEGOVY) 0.5 MG/0.5ML SOAJ SC injection, Inject 0.5 mg into the skin every 7 days, Disp: , Rfl:     hydroCHLOROthiazide (HYDRODIURIL) 25 MG tablet, Take 1 tablet by mouth daily, Disp: , Rfl:     calcium carbonate (TUMS) 500 MG chewable tablet, Take 1 tablet by mouth as needed for Heartburn, Disp: , Rfl:     Multiple Vitamin (MULTI-VITAMIN PO), Take 1 capsule by mouth daily Indications: Bariatric advantage, Disp: , Rfl:     acetaminophen (TYLENOL) 325 MG tablet, Take 1

## 2025-05-19 ENCOUNTER — HOSPITAL ENCOUNTER (OUTPATIENT)
Dept: PHYSICAL THERAPY | Age: 60
Setting detail: THERAPIES SERIES
Discharge: HOME | End: 2025-05-19
Payer: COMMERCIAL

## 2025-05-19 PROCEDURE — 97140 MANUAL THERAPY 1/> REGIONS: CPT

## 2025-05-19 NOTE — PROGRESS NOTES
Physiotouch PHYSIOTOUCH  -TREATMENT PRESSURE: 120 mm Hg  -CONTINUOUS/PULSED: Pulsed  -FREQUENCY:  60,40, 20 Hz  -SECONDS: 2 s  -PERCENTAGE: 60 %  -LOCATION: L distal LE  -GOAL: Decongestion/Pain Reduction/Fibrotic tissue breakdown  -PATIENT TOLERANCE: Patient denied pain during/following treatment session.   -softening of tissue noted post physiotouch             Decongestive Therapy Exercises (DTE) Exercise Reps Sets Hold Done   Ankle pumps 10 1  X   SAQ  10 1 3s X                                X Instructed to complete a set of 10 ankle pumps every hour            Yes/No Date completed Therapist initials Date Item received   Pump referral x 3/27/25 ES    Garments ordered       Garment specifics      Specific Interventions Next Treatment: Manual lymphatic drainage B LE, compression, skin care, lymphatouch, kinesiotape, there ex    Activity Tolerance:  [x]  Patient tolerated activity well  []  Patient limited by fatigue  []  Patient limited by pain   []  Patient limited by medical complications  []  Other:     Patient Education:   [x]  HEP/Education Completed: Wearing kinesiotape, compression pump follow up  [employer] Access Code:  []  No new Education completed  [x]  Reviewed Prior HEP      [x]  Patient verbalized and/or demonstrated understanding of education provided.  []  Patient unable to verbalize and/or demonstrate understanding of education provided.  Will continue education.  [x]  Barriers to learning: None per patient    Assessment: Continued with MLD to BLEs as noted above. Reapplied kinesiotape to BLEs. Pt tolerated session well.     GOALS:  Patient Goal: \"Decrease size of legs\"    Short Term Goals:  Time Frame: 4 weeks   Patient will demonstrate a decrease in circumferential measurements of the affected extremity by 2 cm working towards the lymphedema swelling stabilizing and patient being able to get measured and fitted for a new compression garment to be worn daily to keep swelling down.  MET: See

## 2025-05-27 ENCOUNTER — HOSPITAL ENCOUNTER (OUTPATIENT)
Dept: PHYSICAL THERAPY | Age: 60
Setting detail: THERAPIES SERIES
Discharge: HOME | End: 2025-05-27
Payer: COMMERCIAL

## 2025-05-27 PROCEDURE — 97140 MANUAL THERAPY 1/> REGIONS: CPT

## 2025-05-27 PROCEDURE — 97535 SELF CARE MNGMENT TRAINING: CPT

## 2025-05-27 NOTE — PROGRESS NOTES
reports the medigrip stockinettes continue to roll down. Did discuss different long term compression options for the future (ie custom velcro garments).       GOALS:  Patient Goal: \"Decrease size of legs\"    Short Term Goals:  Time Frame: 4 weeks   Refer to LTGs    Long Term Goals:  Time Frame: 8 weeks  * Lymphedema swelling will stabilize as noted by total circumferential changes being no greater than 3.0-5.0 cm in preparation for being measured for new compression garment(s) to be worn daily to keep swelling down.  * Patient/family/caregiver will demonstrate donning/doffing compression garments with modified independence to wear compression garments daily to keep swelling down.    * Patient/family/caregiver will verbalize a good understanding regarding proper wearing and replacement schedule, precautions and care of garment(s).     *Improve LLIS to 8 or less to assist with longterm survivorship with lymphedema.      PLAN:  Treatment Recommendations:Manual Therapy/Manual Lymph Drainage, Skin Care/Education, Compression Bandaging, Garment Fitting/Assessment, Strengthening , Range of Motion, Endurance Training, Functional Mobility Training, Safety Training, Patient Education, and Home Exercise Program    []  Plan of care initiated.  Plan to see patient 2-3 times per week for up to 12 weeks to address the treatment planned outlined above.  Treatment may be decreased as appropriate according to patient need.    []  Referral for Sequential Compression Pump-discussed this date.    []  Continue with current plan of care  [x]  Modify plan of care as follows:  Plan 1 time per week for up to 8 weeks while waiting on pump.  Is symptoms worsen may be seen up to 2-3 times per week.  []  Hold pending physician visit  []  Discharge    Time In 1401   Time Out 1446   Timed Code Minutes: 45 min   Total Treatment Time: 45 min     Electronically Signed by: Leti Celestin PTA on 5/27/2025

## 2025-06-03 DIAGNOSIS — I26.99 PULMONARY EMBOLISM, OTHER, UNSPECIFIED CHRONICITY, UNSPECIFIED WHETHER ACUTE COR PULMONALE PRESENT (HCC): ICD-10-CM

## 2025-06-03 DIAGNOSIS — R06.02 SHORTNESS OF BREATH: ICD-10-CM

## 2025-06-03 DIAGNOSIS — E78.00 ELEVATED LDL CHOLESTEROL LEVEL: Primary | ICD-10-CM

## 2025-06-03 NOTE — TELEPHONE ENCOUNTER
Labs in chart from 5-21-25.  Also see attached letter form [facility] Enrollment Note 6-3-25.

## 2025-06-04 LAB
CHOLEST SERPL-MCNC: 239 MG/DL (ref 0–199)
FASTING: YES
GLUCOSE SERPL-MCNC: 108 MG/DL (ref 74–109)
HDLC SERPL-MCNC: 67 MG/DL (ref 40–90)
LDLC SERPL CALC-MCNC: 145 MG/DL
TRIGL SERPL-MCNC: 134 MG/DL (ref 0–199)

## 2025-06-04 RX ORDER — ATORVASTATIN CALCIUM 80 MG/1
80 TABLET, FILM COATED ORAL DAILY
Qty: 90 TABLET | Refills: 1 | Status: SHIPPED | OUTPATIENT
Start: 2025-06-04

## 2025-08-04 RX ORDER — APIXABAN 5 MG/1
5 TABLET, FILM COATED ORAL 2 TIMES DAILY
Qty: 180 TABLET | Refills: 1 | Status: SHIPPED | OUTPATIENT
Start: 2025-08-04

## 2025-08-11 ENCOUNTER — OFFICE VISIT (OUTPATIENT)
Dept: FAMILY MEDICINE CLINIC | Age: 60
End: 2025-08-11

## 2025-08-11 VITALS
RESPIRATION RATE: 16 BRPM | SYSTOLIC BLOOD PRESSURE: 128 MMHG | WEIGHT: 265.4 LBS | HEART RATE: 76 BPM | DIASTOLIC BLOOD PRESSURE: 80 MMHG | BODY MASS INDEX: 45.31 KG/M2 | TEMPERATURE: 98.1 F | HEIGHT: 64 IN

## 2025-08-11 DIAGNOSIS — Z86.718 HISTORY OF DVT (DEEP VEIN THROMBOSIS): ICD-10-CM

## 2025-08-11 DIAGNOSIS — K21.9 GASTROESOPHAGEAL REFLUX DISEASE, UNSPECIFIED WHETHER ESOPHAGITIS PRESENT: ICD-10-CM

## 2025-08-11 DIAGNOSIS — E66.813 CLASS 3 SEVERE OBESITY DUE TO EXCESS CALORIES WITH SERIOUS COMORBIDITY AND BODY MASS INDEX (BMI) OF 45.0 TO 49.9 IN ADULT (HCC): ICD-10-CM

## 2025-08-11 DIAGNOSIS — R73.01 IFG (IMPAIRED FASTING GLUCOSE): ICD-10-CM

## 2025-08-11 DIAGNOSIS — F41.9 ANXIETY: ICD-10-CM

## 2025-08-11 DIAGNOSIS — I27.20 PULMONARY HYPERTENSION (HCC): ICD-10-CM

## 2025-08-11 DIAGNOSIS — D68.59 HYPERCOAGULABLE STATE: ICD-10-CM

## 2025-08-11 DIAGNOSIS — I10 PRIMARY HYPERTENSION: Primary | ICD-10-CM

## 2025-08-11 DIAGNOSIS — I89.0 LYMPHEDEMA: ICD-10-CM

## 2025-08-11 RX ORDER — HYDROCHLOROTHIAZIDE 25 MG/1
25 TABLET ORAL DAILY
Qty: 90 TABLET | Refills: 3 | Status: SHIPPED | OUTPATIENT
Start: 2025-08-11

## 2025-08-11 RX ORDER — OMEPRAZOLE 20 MG/1
20 CAPSULE, DELAYED RELEASE ORAL DAILY
Qty: 90 CAPSULE | Refills: 3 | Status: SHIPPED | OUTPATIENT
Start: 2025-08-11

## 2025-08-11 RX ORDER — CITALOPRAM HYDROBROMIDE 20 MG/1
20 TABLET ORAL DAILY
Qty: 90 TABLET | Refills: 3 | Status: SHIPPED | OUTPATIENT
Start: 2025-08-11 | End: 2025-08-12 | Stop reason: SDUPTHER

## 2025-08-11 SDOH — HEALTH STABILITY: PHYSICAL HEALTH: ON AVERAGE, HOW MANY MINUTES DO YOU ENGAGE IN EXERCISE AT THIS LEVEL?: 20 MIN

## 2025-08-11 SDOH — ECONOMIC STABILITY: FOOD INSECURITY: WITHIN THE PAST 12 MONTHS, THE FOOD YOU BOUGHT JUST DIDN'T LAST AND YOU DIDN'T HAVE MONEY TO GET MORE.: NEVER TRUE

## 2025-08-11 SDOH — ECONOMIC STABILITY: FOOD INSECURITY: WITHIN THE PAST 12 MONTHS, YOU WORRIED THAT YOUR FOOD WOULD RUN OUT BEFORE YOU GOT MONEY TO BUY MORE.: NEVER TRUE

## 2025-08-11 SDOH — HEALTH STABILITY: PHYSICAL HEALTH: ON AVERAGE, HOW MANY DAYS PER WEEK DO YOU ENGAGE IN MODERATE TO STRENUOUS EXERCISE (LIKE A BRISK WALK)?: 3 DAYS

## 2025-08-11 ASSESSMENT — PATIENT HEALTH QUESTIONNAIRE - PHQ9
SUM OF ALL RESPONSES TO PHQ QUESTIONS 1-9: 0
SUM OF ALL RESPONSES TO PHQ QUESTIONS 1-9: 0
2. FEELING DOWN, DEPRESSED OR HOPELESS: NOT AT ALL
SUM OF ALL RESPONSES TO PHQ QUESTIONS 1-9: 0
1. LITTLE INTEREST OR PLEASURE IN DOING THINGS: NOT AT ALL
SUM OF ALL RESPONSES TO PHQ QUESTIONS 1-9: 0

## 2025-08-11 ASSESSMENT — ENCOUNTER SYMPTOMS
DIARRHEA: 0
CONSTIPATION: 0
WHEEZING: 0
COUGH: 0
NAUSEA: 0
SHORTNESS OF BREATH: 0
BACK PAIN: 0
ABDOMINAL PAIN: 0
VOMITING: 0

## 2025-08-12 ENCOUNTER — TELEPHONE (OUTPATIENT)
Dept: FAMILY MEDICINE CLINIC | Age: 60
End: 2025-08-12

## 2025-08-12 DIAGNOSIS — F41.9 ANXIETY: ICD-10-CM

## 2025-08-12 RX ORDER — CITALOPRAM HYDROBROMIDE 20 MG/1
20 TABLET ORAL DAILY
Qty: 90 TABLET | Refills: 3 | Status: SHIPPED | OUTPATIENT
Start: 2025-08-12

## 2025-08-18 ENCOUNTER — OFFICE VISIT (OUTPATIENT)
Dept: ONCOLOGY | Age: 60
End: 2025-08-18

## 2025-08-18 ENCOUNTER — HOSPITAL ENCOUNTER (OUTPATIENT)
Dept: INFUSION THERAPY | Age: 60
Discharge: HOME | End: 2025-08-18
Payer: COMMERCIAL

## 2025-08-18 VITALS
DIASTOLIC BLOOD PRESSURE: 72 MMHG | HEART RATE: 89 BPM | BODY MASS INDEX: 44.46 KG/M2 | SYSTOLIC BLOOD PRESSURE: 142 MMHG | WEIGHT: 260.4 LBS | RESPIRATION RATE: 18 BRPM | OXYGEN SATURATION: 97 % | HEIGHT: 64 IN | TEMPERATURE: 97.6 F

## 2025-08-18 VITALS
OXYGEN SATURATION: 97 % | TEMPERATURE: 97.6 F | HEART RATE: 89 BPM | DIASTOLIC BLOOD PRESSURE: 72 MMHG | RESPIRATION RATE: 18 BRPM | SYSTOLIC BLOOD PRESSURE: 142 MMHG

## 2025-08-18 DIAGNOSIS — Z86.711 HISTORY OF PULMONARY EMBOLISM: ICD-10-CM

## 2025-08-18 DIAGNOSIS — Z86.718 HISTORY OF DVT (DEEP VEIN THROMBOSIS): Primary | ICD-10-CM

## 2025-08-18 PROCEDURE — 99211 OFF/OP EST MAY X REQ PHY/QHP: CPT

## 2025-08-18 RX ORDER — MAGNESIUM GLUCONATE 27 MG(500)
1000 TABLET ORAL DAILY
COMMUNITY

## (undated) DEVICE — SOLUTION IV 1000ML 0.45% SOD CHL PH 5 INJ USP VIAFLX PLAS

## (undated) DEVICE — [HIGH FLOW INSUFFLATOR,  DO NOT USE IF PACKAGE IS DAMAGED,  KEEP DRY,  KEEP AWAY FROM SUNLIGHT,  PROTECT FROM HEAT AND RADIOACTIVE SOURCES.]: Brand: PNEUMOSURE

## (undated) DEVICE — BLADE LARYNSCP SZ 3 ENH DIR INTUB GLIDESCOPE MCGRATH MAC

## (undated) DEVICE — SEALANT HEMSTAT 5ML HUM FIBRIN THROM 2 VI APPL DEV EVICEL

## (undated) DEVICE — 3M™ STERI-STRIP™ COMPOUND BENZOIN TINCTURE 40 BAGS/CARTON 4 CARTONS/CASE C1544: Brand: 3M™ STERI-STRIP™

## (undated) DEVICE — VISIGI 3D®  CALIBRATION SYSTEM  SIZE 36FR STD W/ BULB: Brand: BOEHRINGER® VISIGI 3D™ SLEEVE GASTRECTOMY CALIBRATION SYSTEM, SIZE 36FR W/BULB

## (undated) DEVICE — Device: Brand: NOMOLINE™ LH ADULT NASAL CO2 CANNULA WITH O2 4M

## (undated) DEVICE — REINFORCED RELOAD WITH TRI-STAPLE TECHNOLOGY: Brand: ENDO GIA

## (undated) DEVICE — PACK,UNIVERSAL,NO GOWNS: Brand: MEDLINE

## (undated) DEVICE — BANDAGE ADH W1XL3IN NAT FAB WVN FLX DURABLE N ADH PD SEAL

## (undated) DEVICE — BASIC SINGLE BASIN BTC-LF: Brand: MEDLINE INDUSTRIES, INC.

## (undated) DEVICE — TIBURON NEONATAL DRAPE: Brand: CONVERTORS

## (undated) DEVICE — CATHETER PULM ART 5FR INFL 0.75CC L110CM BAL DIA8MM SGL WDG

## (undated) DEVICE — TROCAR: Brand: KII FIOS FIRST ENTRY

## (undated) DEVICE — BAND COMPR L24CM REG CLR PLAS HEMSTAT EXT HK AND LOOP RETEN

## (undated) DEVICE — KIT INTRO 5FR L7CM NDL 21GA L4CM 0018IN NIT COR PLAT TIP

## (undated) DEVICE — SOLUTION IV IRRIG WATER 1000ML POUR BRL 2F7114

## (undated) DEVICE — MEDI-VAC NON-CONDUCTIVE SUCTION TUBING 6MM X 6.1M (20 FT.) L: Brand: CARDINAL HEALTH

## (undated) DEVICE — CATHETER ETER IV L1IN OD22GA POLYUR PERIPH WNG HUB SFTY SHLD

## (undated) DEVICE — COVER ARMBRD W13XL28.5IN IMPERV BLU FOR OP RM

## (undated) DEVICE — KIT MFLD ISOLATN NACL CNTRST PRT TBNG SPIK W/ PRSS TRNSDUC

## (undated) DEVICE — ARM DRAPE

## (undated) DEVICE — FORCEP RAD JAW W/NEEDLE 160CM

## (undated) DEVICE — GAUZE,SPONGE,8"X4",12PLY,XRAY,STRL,LF: Brand: MEDLINE

## (undated) DEVICE — KIT ANGIO W/ AT P65 PREM HND CTRL FOR CNTRST DEL ANGIOTOUCH

## (undated) DEVICE — CATHETER DIAG 5FR L100CM LUMN ID0.047IN JL3.5 CRV 0 SIDE H

## (undated) DEVICE — CANNULA SEAL

## (undated) DEVICE — SUREFIT, DUAL DISPERSIVE ELECTRODE, CONTACT QUALITY MONITOR: Brand: SUREFIT

## (undated) DEVICE — 3M™ WARMING BLANKET, UPPER BODY, 10 PER CASE, 42268: Brand: BAIR HUGGER™

## (undated) DEVICE — PACK PROCEDURE SURG SET UP SRMC

## (undated) DEVICE — TETRA-FLEX CF WOVEN LATEX FREE ELASTIC BANDAGE 6" X 5.5 YD: Brand: TETRA-FLEX™CF

## (undated) DEVICE — TUBING IV STOPCOCK 48 CM 3 W

## (undated) DEVICE — SYRINGE MED 30ML STD CLR PLAS LUERLOCK TIP N CTRL DISP

## (undated) DEVICE — 5FR MEDTRONIC PIG  110CM

## (undated) DEVICE — RELOAD STPL L45MM M THICK TISS PUR REINF ARTC INTELLIGENCE

## (undated) DEVICE — RELOAD STPLR REINF 60 MM X THCK W/ TRISTAPLE TECHNOLOGY BLK

## (undated) DEVICE — GLIDESHEATH SLENDER NITINOL HYDROPHILIC COATED INTRODUCER SHEATH: Brand: GLIDESHEATH SLENDER

## (undated) DEVICE — INTENDED FOR TISSUE SEPARATION, AND OTHER PROCEDURES THAT REQUIRE A SHARP SURGICAL BLADE TO PUNCTURE OR CUT.: Brand: BARD-PARKER ® CARBON RIB-BACK BLADES

## (undated) DEVICE — TIP APPL L35CM RIG FOR SEAL EVICEL

## (undated) DEVICE — 2000CC GUARDIAN II: Brand: GUARDIAN

## (undated) DEVICE — TRAY CATH 16FR F INCLUDE LUB DRNGE BG STATLOK STBL DEV

## (undated) DEVICE — GOWN,SIRUS,NONRNF,SETINSLV,XL,20/CS: Brand: MEDLINE

## (undated) DEVICE — CARDIAC CATH LAB PACK LF

## (undated) DEVICE — HYPODERMIC SAFETY NEEDLE: Brand: MAGELLAN

## (undated) DEVICE — BINDER ABD 4-PANEL 12INCH 63-74INCH L N ST

## (undated) DEVICE — ENDO KIT: Brand: MEDLINE INDUSTRIES, INC.

## (undated) DEVICE — CATHETER 5FR 3DRC MEDTRONIC 100CM

## (undated) DEVICE — CATHETER 5FR JR4 CORDIS 100CM

## (undated) DEVICE — SOLUTION ANTIFOG VIS SYS CLEARIFY LAPSCP

## (undated) DEVICE — GOWN,SIRUS,NON REINFRCD,LARGE,SET IN SL: Brand: MEDLINE

## (undated) DEVICE — DEFENDO AIR WATER SUCTION AND BIOPSY VALVE KIT FOR  OLYMPUS: Brand: DEFENDO AIR/WATER/SUCTION AND BIOPSY VALVE

## (undated) DEVICE — BLADELESS OBTURATOR: Brand: WECK VISTA

## (undated) DEVICE — STRIP,CLOSURE,WOUND,MEDI-STRIP,1/2X4: Brand: MEDLINE

## (undated) DEVICE — TROCARS: Brand: KII® OPTICAL ACCESS SYSTEM

## (undated) DEVICE — CHLORAPREP 26ML ORANGE

## (undated) DEVICE — KIT MED IMAG CNTRST AGNT W/ IOPAMIDOL REUSE

## (undated) DEVICE — ELECTRO LUBE IS A SINGLE PATIENT USE DEVICE THAT IS INTENDED TO BE USED ON ELECTROSURGICAL ELECTRODES TO REDUCE STICKING.: Brand: KEY SURGICAL ELECTRO LUBE

## (undated) DEVICE — 260 CM J TIP WIRE .035

## (undated) DEVICE — DRAPE KIT RAMAPR RADIATION SHIELD

## (undated) DEVICE — CONMED SCOPE SAVER BITE BLOCK, 20X27 MM: Brand: SCOPE SAVER

## (undated) DEVICE — 3M™ STERI-STRIP™ BLEND TONE SKIN CLOSURES, B1557, TAN, 1/2 IN X 4 IN (12MM X 100MM), 6 STRIPS/ENVELOPE: Brand: 3M™ STERI-STRIP™

## (undated) DEVICE — IV START KIT: Brand: MEDLINE INDUSTRIES, INC.

## (undated) DEVICE — SET ADMIN 25ML L117IN PMP MOD CK VLV RLER CLMP 2 SMRTSITE

## (undated) DEVICE — COLUMN DRAPE